# Patient Record
Sex: MALE | Race: WHITE | Employment: OTHER | URBAN - METROPOLITAN AREA
[De-identification: names, ages, dates, MRNs, and addresses within clinical notes are randomized per-mention and may not be internally consistent; named-entity substitution may affect disease eponyms.]

---

## 2017-08-15 PROBLEM — H26.9 LEFT CATARACT: Status: ACTIVE | Noted: 2017-08-15

## 2018-07-02 ENCOUNTER — HOSPITAL ENCOUNTER (OUTPATIENT)
Age: 68
Discharge: HOME | End: 2018-07-02
Payer: COMMERCIAL

## 2018-07-02 ENCOUNTER — HOSPITAL ENCOUNTER (OUTPATIENT)
Age: 68
Discharge: HOME | End: 2018-07-04
Payer: COMMERCIAL

## 2018-07-02 ENCOUNTER — HOSPITAL ENCOUNTER (OUTPATIENT)
Dept: GENERAL RADIOLOGY | Age: 68
Discharge: HOME | End: 2018-07-04
Payer: COMMERCIAL

## 2018-07-02 DIAGNOSIS — I10 HYPERTENSION, UNSPECIFIED TYPE: ICD-10-CM

## 2018-07-02 LAB
ALBUMIN SERPL-MCNC: 4.4 G/DL (ref 3.5–5.2)
ALP BLD-CCNC: 48 U/L (ref 40–129)
ALT SERPL-CCNC: 52 U/L (ref 0–40)
ANION GAP SERPL CALCULATED.3IONS-SCNC: 16 MMOL/L (ref 7–16)
AST SERPL-CCNC: 34 U/L (ref 0–39)
BASOPHILS ABSOLUTE: 0.05 E9/L (ref 0–0.2)
BASOPHILS RELATIVE PERCENT: 0.9 % (ref 0–2)
BILIRUB SERPL-MCNC: 0.4 MG/DL (ref 0–1.2)
BILIRUBIN URINE: NEGATIVE
BLOOD, URINE: NEGATIVE
BUN BLDV-MCNC: 10 MG/DL (ref 8–23)
CALCIUM SERPL-MCNC: 9.3 MG/DL (ref 8.6–10.2)
CHLORIDE BLD-SCNC: 105 MMOL/L (ref 98–107)
CHOLESTEROL, TOTAL: 149 MG/DL (ref 0–199)
CLARITY: CLEAR
CO2: 21 MMOL/L (ref 22–29)
COLOR: YELLOW
CREAT SERPL-MCNC: 1.1 MG/DL (ref 0.7–1.2)
EOSINOPHILS ABSOLUTE: 0.21 E9/L (ref 0.05–0.5)
EOSINOPHILS RELATIVE PERCENT: 3.8 % (ref 0–6)
GFR AFRICAN AMERICAN: >60
GFR NON-AFRICAN AMERICAN: >60 ML/MIN/1.73
GLUCOSE BLD-MCNC: 143 MG/DL (ref 74–109)
GLUCOSE URINE: NEGATIVE MG/DL
HCT VFR BLD CALC: 39.6 % (ref 37–54)
HDLC SERPL-MCNC: 30 MG/DL
HEMOGLOBIN: 13.6 G/DL (ref 12.5–16.5)
IMMATURE GRANULOCYTES #: 0.02 E9/L
IMMATURE GRANULOCYTES %: 0.4 % (ref 0–5)
KETONES, URINE: NEGATIVE MG/DL
LDL CHOLESTEROL CALCULATED: 81 MG/DL (ref 0–99)
LEUKOCYTE ESTERASE, URINE: NEGATIVE
LYMPHOCYTES ABSOLUTE: 1.95 E9/L (ref 1.5–4)
LYMPHOCYTES RELATIVE PERCENT: 34.8 % (ref 20–42)
MCH RBC QN AUTO: 30.2 PG (ref 26–35)
MCHC RBC AUTO-ENTMCNC: 34.3 % (ref 32–34.5)
MCV RBC AUTO: 88 FL (ref 80–99.9)
MONOCYTES ABSOLUTE: 0.61 E9/L (ref 0.1–0.95)
MONOCYTES RELATIVE PERCENT: 10.9 % (ref 2–12)
NEUTROPHILS ABSOLUTE: 2.76 E9/L (ref 1.8–7.3)
NEUTROPHILS RELATIVE PERCENT: 49.2 % (ref 43–80)
NITRITE, URINE: NEGATIVE
PDW BLD-RTO: 12.7 FL (ref 11.5–15)
PH UA: 5.5 (ref 5–9)
PLATELET # BLD: 162 E9/L (ref 130–450)
PMV BLD AUTO: 9.9 FL (ref 7–12)
POTASSIUM SERPL-SCNC: 4.8 MMOL/L (ref 3.5–5)
PROSTATE SPECIFIC ANTIGEN: 0.46 NG/ML (ref 0–4)
PROTEIN UA: NEGATIVE MG/DL
RBC # BLD: 4.5 E12/L (ref 3.8–5.8)
SODIUM BLD-SCNC: 142 MMOL/L (ref 132–146)
SPECIFIC GRAVITY UA: >=1.03 (ref 1–1.03)
T4 TOTAL: 6.6 MCG/DL (ref 4.5–11.7)
TOTAL PROTEIN: 6.9 G/DL (ref 6.4–8.3)
TRIGL SERPL-MCNC: 189 MG/DL (ref 0–149)
TSH SERPL DL<=0.05 MIU/L-ACNC: 2.35 UIU/ML (ref 0.27–4.2)
UROBILINOGEN, URINE: 0.2 E.U./DL
VLDLC SERPL CALC-MCNC: 38 MG/DL
WBC # BLD: 5.6 E9/L (ref 4.5–11.5)

## 2018-07-02 PROCEDURE — 85025 COMPLETE CBC W/AUTO DIFF WBC: CPT

## 2018-07-02 PROCEDURE — 36415 COLL VENOUS BLD VENIPUNCTURE: CPT

## 2018-07-02 PROCEDURE — 71046 X-RAY EXAM CHEST 2 VIEWS: CPT

## 2018-07-02 PROCEDURE — 80053 COMPREHEN METABOLIC PANEL: CPT

## 2018-07-02 PROCEDURE — 80061 LIPID PANEL: CPT

## 2018-07-02 PROCEDURE — G0103 PSA SCREENING: HCPCS

## 2018-07-02 PROCEDURE — 84436 ASSAY OF TOTAL THYROXINE: CPT

## 2018-07-02 PROCEDURE — 84443 ASSAY THYROID STIM HORMONE: CPT

## 2018-07-02 PROCEDURE — 81003 URINALYSIS AUTO W/O SCOPE: CPT

## 2018-09-14 ENCOUNTER — ANESTHESIA EVENT (OUTPATIENT)
Dept: OPERATING ROOM | Age: 68
End: 2018-09-14
Payer: COMMERCIAL

## 2018-09-14 NOTE — ANESTHESIA PRE PROCEDURE
patient. Plan discussed with CRNA. This preop was generated based on the electronic medical record only. Patient was not seen for evaluation or physical exam.  Staff anesthesiologist will evaluate the patient on the day of procedure. Anuja Jurado MD  Staff Anesthesiologist  September 14, 2018  11:20 AM  DOS STAFF ADDENDUM:    Pt seen and examined, chart reviewed (including anesthesia, drug and allergy history). Anesthetic plan, risks, benefits, alternatives, and personnel involved discussed with patient. Patient verbalized an understanding and agrees to proceed. Plan discussed with care team members and agreed upon.     Jane Aleman MD  Staff Anesthesiologist  8:51 AM

## 2018-09-18 ENCOUNTER — HOSPITAL ENCOUNTER (OUTPATIENT)
Age: 68
Setting detail: OUTPATIENT SURGERY
Discharge: HOME | End: 2018-09-18
Attending: OPHTHALMOLOGY | Admitting: OPHTHALMOLOGY
Payer: COMMERCIAL

## 2018-09-18 ENCOUNTER — ANESTHESIA (OUTPATIENT)
Dept: OPERATING ROOM | Age: 68
End: 2018-09-18
Payer: COMMERCIAL

## 2018-09-18 VITALS
TEMPERATURE: 98 F | HEART RATE: 62 BPM | HEIGHT: 73 IN | BODY MASS INDEX: 35.25 KG/M2 | WEIGHT: 266 LBS | OXYGEN SATURATION: 96 % | SYSTOLIC BLOOD PRESSURE: 129 MMHG | RESPIRATION RATE: 14 BRPM | DIASTOLIC BLOOD PRESSURE: 75 MMHG

## 2018-09-18 VITALS
OXYGEN SATURATION: 95 % | TEMPERATURE: 98.6 F | SYSTOLIC BLOOD PRESSURE: 145 MMHG | RESPIRATION RATE: 20 BRPM | DIASTOLIC BLOOD PRESSURE: 87 MMHG

## 2018-09-18 PROBLEM — H26.9 RIGHT CATARACT: Status: ACTIVE | Noted: 2018-09-18

## 2018-09-18 LAB — GLUCOSE BLD-MCNC: 155 MG/DL

## 2018-09-18 PROCEDURE — 6370000000 HC RX 637 (ALT 250 FOR IP): Performed by: OPHTHALMOLOGY

## 2018-09-18 PROCEDURE — 82962 GLUCOSE BLOOD TEST: CPT | Performed by: OPHTHALMOLOGY

## 2018-09-18 PROCEDURE — 3600000003 HC SURGERY LEVEL 3 BASE: Performed by: OPHTHALMOLOGY

## 2018-09-18 PROCEDURE — 2500000003 HC RX 250 WO HCPCS: Performed by: OPHTHALMOLOGY

## 2018-09-18 PROCEDURE — 2580000003 HC RX 258: Performed by: ANESTHESIOLOGY

## 2018-09-18 PROCEDURE — 7100000010 HC PHASE II RECOVERY - FIRST 15 MIN: Performed by: OPHTHALMOLOGY

## 2018-09-18 PROCEDURE — 2500000003 HC RX 250 WO HCPCS: Performed by: NURSE ANESTHETIST, CERTIFIED REGISTERED

## 2018-09-18 PROCEDURE — 7100000011 HC PHASE II RECOVERY - ADDTL 15 MIN: Performed by: OPHTHALMOLOGY

## 2018-09-18 PROCEDURE — 2709999900 HC NON-CHARGEABLE SUPPLY: Performed by: OPHTHALMOLOGY

## 2018-09-18 PROCEDURE — 3700000000 HC ANESTHESIA ATTENDED CARE: Performed by: OPHTHALMOLOGY

## 2018-09-18 PROCEDURE — V2632 POST CHMBR INTRAOCULAR LENS: HCPCS | Performed by: OPHTHALMOLOGY

## 2018-09-18 PROCEDURE — 6360000002 HC RX W HCPCS: Performed by: NURSE ANESTHETIST, CERTIFIED REGISTERED

## 2018-09-18 DEVICE — LENS INTOCU +21.5 DIOPT A CONSTANT 118.8 L13MM DIA6MM 0DEG: Type: IMPLANTABLE DEVICE | Site: EYE | Status: FUNCTIONAL

## 2018-09-18 RX ORDER — BALANCED SALT SOLUTION 6.4; .75; .48; .3; 3.9; 1.7 MG/ML; MG/ML; MG/ML; MG/ML; MG/ML; MG/ML
SOLUTION OPHTHALMIC PRN
Status: DISCONTINUED | OUTPATIENT
Start: 2018-09-18 | End: 2018-09-18 | Stop reason: HOSPADM

## 2018-09-18 RX ORDER — TETRACAINE HYDROCHLORIDE 5 MG/ML
1 SOLUTION OPHTHALMIC ONCE
Status: COMPLETED | OUTPATIENT
Start: 2018-09-18 | End: 2018-09-18

## 2018-09-18 RX ORDER — FLURBIPROFEN SODIUM 0.3 MG/ML
1 SOLUTION/ DROPS OPHTHALMIC
Status: COMPLETED | OUTPATIENT
Start: 2018-09-18 | End: 2018-09-18

## 2018-09-18 RX ORDER — SODIUM CHLORIDE, SODIUM LACTATE, POTASSIUM CHLORIDE, CALCIUM CHLORIDE 600; 310; 30; 20 MG/100ML; MG/100ML; MG/100ML; MG/100ML
INJECTION, SOLUTION INTRAVENOUS CONTINUOUS
Status: DISCONTINUED | OUTPATIENT
Start: 2018-09-18 | End: 2018-09-18 | Stop reason: HOSPADM

## 2018-09-18 RX ORDER — PHENYLEPHRINE HYDROCHLORIDE 100 MG/ML
1 SOLUTION/ DROPS OPHTHALMIC PRN
Status: DISCONTINUED | OUTPATIENT
Start: 2018-09-18 | End: 2018-09-18 | Stop reason: HOSPADM

## 2018-09-18 RX ORDER — ALFENTANIL HYDROCHLORIDE 500 UG/ML
INJECTION INTRAVENOUS PRN
Status: DISCONTINUED | OUTPATIENT
Start: 2018-09-18 | End: 2018-09-18 | Stop reason: SDUPTHER

## 2018-09-18 RX ORDER — MIDAZOLAM HYDROCHLORIDE 1 MG/ML
INJECTION INTRAMUSCULAR; INTRAVENOUS PRN
Status: DISCONTINUED | OUTPATIENT
Start: 2018-09-18 | End: 2018-09-18 | Stop reason: SDUPTHER

## 2018-09-18 RX ORDER — TETRACAINE HYDROCHLORIDE 5 MG/ML
SOLUTION OPHTHALMIC PRN
Status: DISCONTINUED | OUTPATIENT
Start: 2018-09-18 | End: 2018-09-18 | Stop reason: HOSPADM

## 2018-09-18 RX ORDER — PHENYLEPHRINE HCL 2.5 %
1 DROPS OPHTHALMIC (EYE)
Status: COMPLETED | OUTPATIENT
Start: 2018-09-18 | End: 2018-09-18

## 2018-09-18 RX ORDER — CYCLOPENTOLATE HYDROCHLORIDE 10 MG/ML
1 SOLUTION/ DROPS OPHTHALMIC
Status: COMPLETED | OUTPATIENT
Start: 2018-09-18 | End: 2018-09-18

## 2018-09-18 RX ADMIN — PHENYLEPHRINE HYDROCHLORIDE 1 DROP: 25 SOLUTION OPHTHALMIC at 08:35

## 2018-09-18 RX ADMIN — SODIUM CHLORIDE, POTASSIUM CHLORIDE, SODIUM LACTATE AND CALCIUM CHLORIDE: 600; 310; 30; 20 INJECTION, SOLUTION INTRAVENOUS at 09:00

## 2018-09-18 RX ADMIN — PHENYLEPHRINE HYDROCHLORIDE 1 DROP: 25 SOLUTION OPHTHALMIC at 08:40

## 2018-09-18 RX ADMIN — MIDAZOLAM 1 MG: 1 INJECTION INTRAMUSCULAR; INTRAVENOUS at 09:54

## 2018-09-18 RX ADMIN — FLURBIPROFEN SODIUM 1 DROP: 0.3 SOLUTION/ DROPS OPHTHALMIC at 08:40

## 2018-09-18 RX ADMIN — ALFENTANIL HYDROCHLORIDE 250 MCG: 500 INJECTION INTRAVENOUS at 09:58

## 2018-09-18 RX ADMIN — ALFENTANIL HYDROCHLORIDE 250 MCG: 500 INJECTION INTRAVENOUS at 09:55

## 2018-09-18 RX ADMIN — TETRACAINE HYDROCHLORIDE 1 DROP: 25 LIQUID OPHTHALMIC at 09:09

## 2018-09-18 RX ADMIN — MIDAZOLAM 1 MG: 1 INJECTION INTRAMUSCULAR; INTRAVENOUS at 09:52

## 2018-09-18 RX ADMIN — FLURBIPROFEN SODIUM 1 DROP: 0.3 SOLUTION/ DROPS OPHTHALMIC at 08:35

## 2018-09-18 RX ADMIN — ALFENTANIL HYDROCHLORIDE 250 MCG: 500 INJECTION INTRAVENOUS at 09:54

## 2018-09-18 RX ADMIN — FLURBIPROFEN SODIUM 1 DROP: 0.3 SOLUTION/ DROPS OPHTHALMIC at 08:30

## 2018-09-18 RX ADMIN — ALFENTANIL HYDROCHLORIDE 250 MCG: 500 INJECTION INTRAVENOUS at 09:52

## 2018-09-18 RX ADMIN — CYCLOPENTOLATE HYDROCHLORIDE 1 DROP: 10 SOLUTION/ DROPS OPHTHALMIC at 08:30

## 2018-09-18 RX ADMIN — PHENYLEPHRINE HYDROCHLORIDE 1 DROP: 25 SOLUTION OPHTHALMIC at 08:30

## 2018-09-18 RX ADMIN — CYCLOPENTOLATE HYDROCHLORIDE 1 DROP: 10 SOLUTION/ DROPS OPHTHALMIC at 08:40

## 2018-09-18 RX ADMIN — CYCLOPENTOLATE HYDROCHLORIDE 1 DROP: 10 SOLUTION/ DROPS OPHTHALMIC at 08:35

## 2018-09-18 ASSESSMENT — PAIN - FUNCTIONAL ASSESSMENT: PAIN_FUNCTIONAL_ASSESSMENT: 0-10

## 2018-09-18 ASSESSMENT — LIFESTYLE VARIABLES: SMOKING_STATUS: 1

## 2018-09-18 ASSESSMENT — PULMONARY FUNCTION TESTS
PIF_VALUE: 0

## 2018-09-18 ASSESSMENT — PAIN SCALES - GENERAL
PAINLEVEL_OUTOF10: 0
PAINLEVEL_OUTOF10: 0

## 2018-09-18 NOTE — OP NOTE
PREOPERATIVE DIAGNOSIS:  Right Cataract    POSTOPERATIVE DIAGNOSIS:  Right Cataract    PROCEDURE:  Right phacoemulsification with intraocular lens implant. ANESTHESIA:  Local Mac    ESTIMATED BLOOD LOSS:  Minimal    COMPLICATIONS:  None    DESCRIPTION OF PROCEDURE:  The patient was brought into the operating room. The operative eye was marked, which was the right eye. The right eye was then prepped with a full-strength Betadine preparation. The periorbital area was copiously washed with Betadine. The lashes were washed with Betadine. Dilute Betadine was then also put in the inferior and superior fornices and left in place for approximately a minute or 2. This was then irrigated with sterile water. The face was then wiped and the preparation was repeated 1 more time. The drape was then placed over the operative eye with the sticky adhesive placed at the lid margins so that it draped the lashes out of the operative field superiorly and inferiorly, as well as isolated the meibomian glands behind the drape. This cleared the operative field of any meibomian gland secretions and eyelashes. Next, a lid speculum was positioned in the right eye. A super sharp blade was used to make a side-port incision at the 2 o'clock position. A clear corneal incision was fashioned over the 12 o;clock meridian with a 2.3mm keratome at the limbus. Healon was used to reform the anterior chamber. A continuous tear anterior capsulotomy was then performed with a bent needle cystotome. Hydrodissection was performed by irrigating with balanced salt solution through a syringe underneath the anterior capsular flap to loosen and allow free rotation of the lens nucleus. Phacoemulsification was used and the entire lens nucleus was removed. The I A unit was instilled in the eye, and the remaining cortex was removed. Healon was used to separate the anterior and posterior capsular flaps.   The PCBOO 21.5 diopter lens was then instilled into the capsular bag and dialed to 3 and 9 o'clock positions. Healon was removed from in front of and behind the lens. The lens was found to be well centered, well positioned in the bag and stable. The wound was checked and found to be airtight and watertight. The lid speculum was removed and the drape was removed. The patient was brought to the recovery room in excellent condition, given postoperative instructions, and discharge in excellent condition.

## 2018-09-18 NOTE — H&P
Patient Name: Daisy Chavarria is a 70 y.o. male    Chief Complaint of:  Decreased vision of the right eye    Present Illness:  Mature right cataract with decreased vision. Risks and complications as well as options and benefits were discussed with the patient and he has elected to proceed with right cataract extraction with intra ocular lens implant. History:    Past Medical History:   Diagnosis Date    CAD (coronary artery disease)     has had 2 cardiac caths   states has small blockages    COPD (chronic obstructive pulmonary disease) (HCC)     Diabetes mellitus (HCC)     Gastric ulcer     surgery in 1983    Hypertension     Sleep apnea     doesnt use his cpap        Pertinent   Family History:  family history includes Cancer in his mother; Diabetes in his father; Heart Disease in his father. Medications:    No current facility-administered medications for this encounter. Current Outpatient Prescriptions:     glipiZIDE (GLUCOTROL) 5 MG tablet, Take 1 tablet by mouth 2 times daily (before meals). , Disp: 60 tablet, Rfl: 0    metFORMIN (GLUCOPHAGE) 1000 MG tablet, Take 1 tablet by mouth 2 times daily (with meals). , Disp: 60 tablet, Rfl: 0    isosorbide mononitrate (IMDUR) 30 MG CR tablet, Take 30 mg by mouth daily. , Disp: , Rfl:     aspirin 81 MG tablet, Take 81 mg by mouth daily. , Disp: , Rfl:     metoprolol (LOPRESSOR) 25 MG tablet, Take 25 mg by mouth daily , Disp: , Rfl:     Omega 3 1000 MG CAPS, Take 1,000 mg by mouth daily. , Disp: , Rfl:       Allergies:   Patient has no known allergies. PHYSICAL EXAMINATION / GENERAL APPEARANCE:    HEAD: Mature right cataract with decreased vision effecting reading, driving and all routine home activities. The patient complains of severe glare and halos around headlights making it nearly impossible to continue to drive at night.   Visual acuity is 20/100    HEART: CAD    LUNGS: COPD    ABDOMEN: exam deferred    OTHER SIGNIFICANT FINDINGS:  Pseudo left rubi, KAudrey Horton    IMPRESSION/INITIAL DIAGNOSIS:  Mature right cataract      Electronically signed by Bao Richardson MD  on 9/17/2018 at 8:24 PM

## 2020-01-13 ENCOUNTER — HOSPITAL ENCOUNTER (OUTPATIENT)
Age: 70
Discharge: HOME | End: 2020-01-13
Payer: MEDICARE

## 2020-01-13 LAB
ALBUMIN SERPL-MCNC: 4.6 G/DL (ref 3.5–5.2)
ALP BLD-CCNC: 47 U/L (ref 40–129)
ALT SERPL-CCNC: 63 U/L (ref 0–40)
ANION GAP SERPL CALCULATED.3IONS-SCNC: 14 MMOL/L (ref 7–16)
AST SERPL-CCNC: 53 U/L (ref 0–39)
BASOPHILS ABSOLUTE: 0.04 E9/L (ref 0–0.2)
BASOPHILS RELATIVE PERCENT: 0.7 % (ref 0–2)
BILIRUB SERPL-MCNC: 0.4 MG/DL (ref 0–1.2)
BILIRUBIN URINE: NEGATIVE
BLOOD, URINE: NEGATIVE
BUN BLDV-MCNC: 11 MG/DL (ref 8–23)
CALCIUM SERPL-MCNC: 9.4 MG/DL (ref 8.6–10.2)
CHLORIDE BLD-SCNC: 102 MMOL/L (ref 98–107)
CHOLESTEROL, TOTAL: 145 MG/DL (ref 0–199)
CLARITY: CLEAR
CO2: 23 MMOL/L (ref 22–29)
COLOR: YELLOW
CREAT SERPL-MCNC: 1.1 MG/DL (ref 0.7–1.2)
EOSINOPHILS ABSOLUTE: 0.16 E9/L (ref 0.05–0.5)
EOSINOPHILS RELATIVE PERCENT: 2.8 % (ref 0–6)
GFR AFRICAN AMERICAN: >60
GFR NON-AFRICAN AMERICAN: >60 ML/MIN/1.73
GLUCOSE BLD-MCNC: 128 MG/DL (ref 74–99)
GLUCOSE URINE: NEGATIVE MG/DL
HBA1C MFR BLD: 5.6 % (ref 4–5.6)
HCT VFR BLD CALC: 42.5 % (ref 37–54)
HDLC SERPL-MCNC: 30 MG/DL
HEMOGLOBIN: 14.7 G/DL (ref 12.5–16.5)
IMMATURE GRANULOCYTES #: 0.02 E9/L
IMMATURE GRANULOCYTES %: 0.4 % (ref 0–5)
KETONES, URINE: NEGATIVE MG/DL
LDL CHOLESTEROL CALCULATED: 69 MG/DL (ref 0–99)
LEUKOCYTE ESTERASE, URINE: NEGATIVE
LYMPHOCYTES ABSOLUTE: 1.84 E9/L (ref 1.5–4)
LYMPHOCYTES RELATIVE PERCENT: 32.2 % (ref 20–42)
MCH RBC QN AUTO: 30.9 PG (ref 26–35)
MCHC RBC AUTO-ENTMCNC: 34.6 % (ref 32–34.5)
MCV RBC AUTO: 89.3 FL (ref 80–99.9)
MONOCYTES ABSOLUTE: 0.6 E9/L (ref 0.1–0.95)
MONOCYTES RELATIVE PERCENT: 10.5 % (ref 2–12)
NEUTROPHILS ABSOLUTE: 3.05 E9/L (ref 1.8–7.3)
NEUTROPHILS RELATIVE PERCENT: 53.4 % (ref 43–80)
NITRITE, URINE: NEGATIVE
PDW BLD-RTO: 12.6 FL (ref 11.5–15)
PH UA: 5.5 (ref 5–9)
PLATELET # BLD: 153 E9/L (ref 130–450)
PMV BLD AUTO: 10.5 FL (ref 7–12)
POTASSIUM SERPL-SCNC: 5.1 MMOL/L (ref 3.5–5)
PROSTATE SPECIFIC ANTIGEN: 0.45 NG/ML (ref 0–4)
PROTEIN UA: NEGATIVE MG/DL
RBC # BLD: 4.76 E12/L (ref 3.8–5.8)
SODIUM BLD-SCNC: 139 MMOL/L (ref 132–146)
SPECIFIC GRAVITY UA: 1.02 (ref 1–1.03)
T4 TOTAL: 6.5 MCG/DL (ref 4.5–11.7)
TOTAL PROTEIN: 7.1 G/DL (ref 6.4–8.3)
TRIGL SERPL-MCNC: 229 MG/DL (ref 0–149)
TSH SERPL DL<=0.05 MIU/L-ACNC: 2.18 UIU/ML (ref 0.27–4.2)
UROBILINOGEN, URINE: 0.2 E.U./DL
VLDLC SERPL CALC-MCNC: 46 MG/DL
WBC # BLD: 5.7 E9/L (ref 4.5–11.5)

## 2020-01-13 PROCEDURE — G0103 PSA SCREENING: HCPCS

## 2020-01-13 PROCEDURE — 80061 LIPID PANEL: CPT

## 2020-01-13 PROCEDURE — 85025 COMPLETE CBC W/AUTO DIFF WBC: CPT

## 2020-01-13 PROCEDURE — 83036 HEMOGLOBIN GLYCOSYLATED A1C: CPT

## 2020-01-13 PROCEDURE — 36415 COLL VENOUS BLD VENIPUNCTURE: CPT

## 2020-01-13 PROCEDURE — 84443 ASSAY THYROID STIM HORMONE: CPT

## 2020-01-13 PROCEDURE — 81003 URINALYSIS AUTO W/O SCOPE: CPT

## 2020-01-13 PROCEDURE — 80053 COMPREHEN METABOLIC PANEL: CPT

## 2020-01-13 PROCEDURE — 84436 ASSAY OF TOTAL THYROXINE: CPT

## 2020-11-17 ENCOUNTER — HOSPITAL ENCOUNTER (OUTPATIENT)
Age: 70
Discharge: HOME | End: 2020-11-17
Payer: MEDICARE

## 2020-11-17 LAB
ALBUMIN SERPL-MCNC: 4.7 G/DL (ref 3.5–5.2)
ALP BLD-CCNC: 54 U/L (ref 40–129)
ALT SERPL-CCNC: 58 U/L (ref 0–40)
ANION GAP SERPL CALCULATED.3IONS-SCNC: 10 MMOL/L (ref 7–16)
AST SERPL-CCNC: 44 U/L (ref 0–39)
BILIRUB SERPL-MCNC: 0.5 MG/DL (ref 0–1.2)
BUN BLDV-MCNC: 15 MG/DL (ref 8–23)
C-REACTIVE PROTEIN, HIGH SENSITIVITY: 0.8 MG/L (ref 0–3)
CALCIUM SERPL-MCNC: 9.3 MG/DL (ref 8.6–10.2)
CHLORIDE BLD-SCNC: 104 MMOL/L (ref 98–107)
CHOLESTEROL, FASTING: 142 MG/DL (ref 0–199)
CO2: 21 MMOL/L (ref 22–29)
CREAT SERPL-MCNC: 1.2 MG/DL (ref 0.7–1.2)
GFR AFRICAN AMERICAN: >60
GFR NON-AFRICAN AMERICAN: 60 ML/MIN/1.73
GLUCOSE BLD-MCNC: 155 MG/DL (ref 74–99)
HDLC SERPL-MCNC: 32 MG/DL
LDL CHOLESTEROL CALCULATED: 70 MG/DL (ref 0–99)
POTASSIUM SERPL-SCNC: 4.5 MMOL/L (ref 3.5–5)
SODIUM BLD-SCNC: 135 MMOL/L (ref 132–146)
T3 UPTAKE PERCENT: 30.6 % (ref 22.5–37)
T4 TOTAL: 7.5 MCG/DL (ref 4.5–11.7)
TOTAL PROTEIN: 7.3 G/DL (ref 6.4–8.3)
TRIGLYCERIDE, FASTING: 201 MG/DL (ref 0–149)
TSH SERPL DL<=0.05 MIU/L-ACNC: 2.4 UIU/ML (ref 0.27–4.2)
VITAMIN D 25-HYDROXY: 21 NG/ML (ref 30–100)
VLDLC SERPL CALC-MCNC: 40 MG/DL

## 2020-11-17 PROCEDURE — 80061 LIPID PANEL: CPT

## 2020-11-17 PROCEDURE — 84479 ASSAY OF THYROID (T3 OR T4): CPT

## 2020-11-17 PROCEDURE — 80053 COMPREHEN METABOLIC PANEL: CPT

## 2020-11-17 PROCEDURE — 36415 COLL VENOUS BLD VENIPUNCTURE: CPT

## 2020-11-17 PROCEDURE — 82306 VITAMIN D 25 HYDROXY: CPT

## 2020-11-17 PROCEDURE — 84443 ASSAY THYROID STIM HORMONE: CPT

## 2020-11-17 PROCEDURE — 84436 ASSAY OF TOTAL THYROXINE: CPT

## 2020-11-17 PROCEDURE — 86141 C-REACTIVE PROTEIN HS: CPT

## 2021-01-07 ENCOUNTER — OFFICE VISIT (OUTPATIENT)
Dept: CARDIOLOGY CLINIC | Age: 71
End: 2021-01-07
Payer: MEDICARE

## 2021-01-07 VITALS
SYSTOLIC BLOOD PRESSURE: 138 MMHG | BODY MASS INDEX: 34.99 KG/M2 | HEIGHT: 73 IN | OXYGEN SATURATION: 98 % | RESPIRATION RATE: 20 BRPM | DIASTOLIC BLOOD PRESSURE: 82 MMHG | HEART RATE: 70 BPM | WEIGHT: 264 LBS

## 2021-01-07 DIAGNOSIS — R06.09 DOE (DYSPNEA ON EXERTION): ICD-10-CM

## 2021-01-07 DIAGNOSIS — Z01.810 PREOP CARDIOVASCULAR EXAM: Primary | ICD-10-CM

## 2021-01-07 DIAGNOSIS — R94.31 ABNORMAL EKG: ICD-10-CM

## 2021-01-07 PROCEDURE — 99204 OFFICE O/P NEW MOD 45 MIN: CPT | Performed by: INTERNAL MEDICINE

## 2021-01-07 PROCEDURE — 93000 ELECTROCARDIOGRAM COMPLETE: CPT | Performed by: INTERNAL MEDICINE

## 2021-01-07 RX ORDER — FLUTICASONE PROPIONATE 50 MCG
2 SPRAY, SUSPENSION (ML) NASAL DAILY
COMMUNITY

## 2021-01-07 NOTE — PROGRESS NOTES
OUTPATIENT CARDIOLOGY CONSULT    Name: Leonard Rey    Age: 69 y.o. Date of Service: 1/7/2021    Reason for Consultation: Preoperative cardiac evaluation for thyroid surgery    Referring Physician: Aretha March DO    History of Present Illness:  Leonard Rey is a 69 y.o. male who presents today for further evaluation of cardiac evaluation. He has multiple cardiac risk factors including hypertension, diabetes, tobacco abuse and obesity. Stated to cardiac catheterizations in the \"80s and 90s\", but denies any MI or stent placement or angioplasty. He smokes half a pack cigarettes per day and is very sedentary. He lives in apartment with his wife, states most physical exertion he does is walking stressed on the hallway. The apartment is on the first floor with no stairs. Does get occasional shortness of breath when he \"overexerts himself\". He denies chest pain, palpitations, dizziness lightheadedness or syncope. States he has sleep apnea, does not use CPAP machine. He believes his last stress test was in the \"90s\". Review of Systems:  Complete review of systems otherwise negative except as described above.     Past Medical History:  Past Medical History:   Diagnosis Date    CAD (coronary artery disease)     has had 2 cardiac caths   states has small blockages    COPD (chronic obstructive pulmonary disease) (HCC)     Diabetes mellitus (HCC)     Gastric ulcer     surgery in 1983    Hypertension     Sleep apnea     doesnt use his cpap       Past Surgical History:  Past Surgical History:   Procedure Laterality Date    APPENDECTOMY      CATARACT REMOVAL Left 08/15/2017    left eye cataract extraction iwth ioc    CATARACT REMOVAL WITH IMPLANT Right 09/18/2018    COLONOSCOPY      ENDOSCOPY, COLON, DIAGNOSTIC      FRACTURE SURGERY      jaw    OTHER SURGICAL HISTORY      gastric ulcer surgery    DE XCAPSL CTRC RMVL INSJ IO LENS PROSTH W/O ECP Right 9/18/2018    CATARACT EXTRACTION WITH IOL RIGHT EYE performed by Beto Lee MD at [address] [place] [address]       Family History:  Family History   Problem Relation Age of Onset    Cancer Mother         leukemia    Diabetes Father     Heart Attack Father     Heart Attack Brother        Social History:  Social History     Tobacco Use    Smoking status: Current Every Day Smoker     Packs/day: 2.00     Years: 50.00     Pack years: 100.00     Types: Cigarettes    Smokeless tobacco: Never Used    Tobacco comment: currently 0.5 ppd, 1/7/21   Substance Use Topics    Alcohol use: Not Currently     Comment: no alcohol since  March 2014    Drug use: Never   Half pack cigarettes per day patient  Is   He is retired    Allergies:  No Known Allergies    Current Medications:    Current Outpatient Medications:     fluticasone (FLONASE) 50 MCG/ACT nasal spray, 2 sprays by Each Nostril route daily, Disp: , Rfl:     glipiZIDE (GLUCOTROL) 5 MG tablet, Take 1 tablet by mouth 2 times daily (before meals). , Disp: 60 tablet, Rfl: 0    metFORMIN (GLUCOPHAGE) 1000 MG tablet, Take 1 tablet by mouth 2 times daily (with meals). , Disp: 60 tablet, Rfl: 0    isosorbide mononitrate (IMDUR) 30 MG CR tablet, Take 30 mg by mouth daily. , Disp: , Rfl:     aspirin 81 MG tablet, Take 81 mg by mouth daily. , Disp: , Rfl:     metoprolol (LOPRESSOR) 25 MG tablet, Take 25 mg by mouth daily , Disp: , Rfl:     Omega 3 1000 MG CAPS, Take 1,000 mg by mouth daily. , Disp: , Rfl:     Physical Exam:  /82 (Site: Right Upper Arm, Position: Sitting, Cuff Size: Medium Adult)   Pulse 70   Resp 20   Ht 6' 1\" (1.854 m)   Wt 264 lb (119.7 kg)   SpO2 98%   BMI 34.83 kg/m²   Wt Readings from Last 3 Encounters:   01/07/21 264 lb (119.7 kg)   09/18/18 266 lb (120.7 kg)   08/15/17 270 lb 3.2 oz (122.6 kg)     Appearance: Obese male, awake, alert, no acute respiratory distress  Skin: Intact, no rash  Eyes: EOMI, no conjunctival erythema  ENMT: Moist mucous membranes.     Neck: Supple, no elevated JVP, no carotid bruits  Lungs: Clear to auscultation bilaterally. No wheezes, rales, or rhonchi. Cardiac: Regular rhythm with a normal rate. S1 & S2 normal, no murmurs  Abdomen: Soft, nontender, +bowel sounds  Extremities: Moves all extremities x 4, no lower extremity edema  Neurologic: No focal motor deficits apparent, normal mood and affect  Peripheral Pulses: Intact posterior tibial pulses bilaterally    Laboratory Tests:  Lab Results   Component Value Date    CREATININE 1.2 11/17/2020    BUN 15 11/17/2020     11/17/2020    K 4.5 11/17/2020     11/17/2020    CO2 21 (L) 11/17/2020     Lab Results   Component Value Date    MG 1.8 04/11/2014     Lab Results   Component Value Date    WBC 5.7 01/13/2020    HGB 14.7 01/13/2020    HCT 42.5 01/13/2020    MCV 89.3 01/13/2020     01/13/2020     Lab Results   Component Value Date    ALT 58 (H) 11/17/2020    AST 44 (H) 11/17/2020    ALKPHOS 54 11/17/2020    BILITOT 0.5 11/17/2020     No results found for: CKTOTAL, CKMB, CKMBINDEX, TROPONINI  Lab Results   Component Value Date    INR 1.3 04/11/2014    PROTIME 13.3 (H) 04/11/2014     Lab Results   Component Value Date    TSH 2.400 11/17/2020     Lab Results   Component Value Date    LABA1C 5.6 01/13/2020     No results found for: EAG  Lab Results   Component Value Date    CHOL 145 01/13/2020    CHOL 149 07/02/2018    CHOL 151 05/15/2015     Lab Results   Component Value Date    TRIG 229 (H) 01/13/2020    TRIG 189 (H) 07/02/2018    TRIG 153 (H) 05/15/2015     Lab Results   Component Value Date    HDL 32 11/17/2020    HDL 30 01/13/2020    HDL 30 07/02/2018     Lab Results   Component Value Date    LDLCALC 70 11/17/2020    LDLCALC 69 01/13/2020    LDLCALC 81 07/02/2018     Lab Results   Component Value Date    LABVLDL 40 11/17/2020    LABVLDL 46 01/13/2020    LABVLDL 38 07/02/2018     No results found for: CHOLHDLRATIO  No results for input(s): PROBNP in the last 72 hours.     Cardiac Tests:  ECG: Sinus rhythm 70 bpm.  Normal axis. Nonspecific interventricular conduction delay, QRS duration 106 ms. No ST or T wave changes. Echocardiogram: N/A    Stress test: N/A    Cardiac catheterization: N/A    Orders Placed This Encounter   Procedures    Cardiac Stress Test Exercise - Treadmill    EKG 12 Lead        Requested Prescriptions      No prescriptions requested or ordered in this encounter        ASSESSMENT / PLAN:  1. Preoperative cardiac evaluation for thyroid surgery  2. Dyspnea on exertion  3. Coronary artery disease-mild by cath per patient, no records available no prior MI  4. Hypertension, reasonably well controlled  5. Type 2 diabetes, well controlled on oral medications  6. Active tobacco abuse  7. COPD  8. Obstructive sleep apnea, noncompliant with CPAP  9. Obesity BMI 34.8 kg/m²  10. Sedentary lifestyle    Recommendations:  He is very sedentary and it does not seem like he is able to or routinely exerts 4 METS or greater. Exercise treadmill stress test will be useful for cardiac risk a stratification with his cardiac risk factors and dyspnea with exertion which is likely multifactorial.    · Exercise treadmill test  · Lifestyle modification including dietary changes and increase physical activity to achieve weight loss explained and discussed with patient  · Aggressive risk factor modification, including smoking cessation highly recommended  · Compliance with CPAP would be useful  · Continue current cardiac medications  · Continue primary prevention statin therapy given treatment of diabetes  · Further recommendations pending above  · If stress test unremarkable, can follow-up as needed    Thank you for allowing me to participate in your patient's care. Please feel free to contact me if you have any questions or concerns.     John Jacques MD, [facility]

## 2021-01-08 ENCOUNTER — TELEPHONE (OUTPATIENT)
Dept: CARDIOLOGY | Age: 71
End: 2021-01-08

## 2021-01-08 DIAGNOSIS — R94.31 ABNORMAL EKG: ICD-10-CM

## 2021-01-08 DIAGNOSIS — Z01.818 PRE-OP TESTING: ICD-10-CM

## 2021-01-08 DIAGNOSIS — R06.02 SHORTNESS OF BREATH: Primary | ICD-10-CM

## 2021-01-08 NOTE — TELEPHONE ENCOUNTER
1/08/2021, spoke with pt earlier today, states he changed his mind and cannot walk the TM for his stress test, Denice at Dr Christopher Thompson office advised and per Dr Dorota Choudhury, exercise stress test changed to a lexiscan kf rn

## 2021-01-11 ENCOUNTER — TELEPHONE (OUTPATIENT)
Dept: CARDIOLOGY | Age: 71
End: 2021-01-11

## 2021-01-11 ENCOUNTER — HOSPITAL ENCOUNTER (OUTPATIENT)
Dept: CARDIOLOGY | Age: 71
Discharge: HOME | End: 2021-01-11
Payer: MEDICARE

## 2021-01-11 VITALS
HEIGHT: 73 IN | HEART RATE: 87 BPM | RESPIRATION RATE: 20 BRPM | TEMPERATURE: 97.1 F | SYSTOLIC BLOOD PRESSURE: 122 MMHG | WEIGHT: 264 LBS | BODY MASS INDEX: 34.99 KG/M2 | DIASTOLIC BLOOD PRESSURE: 80 MMHG

## 2021-01-11 DIAGNOSIS — R94.31 ABNORMAL EKG: ICD-10-CM

## 2021-01-11 DIAGNOSIS — R06.09 DOE (DYSPNEA ON EXERTION): ICD-10-CM

## 2021-01-11 DIAGNOSIS — Z01.810 PREOP CARDIOVASCULAR EXAM: ICD-10-CM

## 2021-01-11 DIAGNOSIS — Z01.818 PRE-OP TESTING: ICD-10-CM

## 2021-01-11 DIAGNOSIS — R06.02 SHORTNESS OF BREATH: ICD-10-CM

## 2021-01-11 PROCEDURE — A9502 TC99M TETROFOSMIN: HCPCS | Performed by: INTERNAL MEDICINE

## 2021-01-11 PROCEDURE — 93017 CV STRESS TEST TRACING ONLY: CPT

## 2021-01-11 PROCEDURE — 2580000003 HC RX 258: Performed by: INTERNAL MEDICINE

## 2021-01-11 PROCEDURE — 93018 CV STRESS TEST I&R ONLY: CPT | Performed by: INTERNAL MEDICINE

## 2021-01-11 PROCEDURE — 93016 CV STRESS TEST SUPVJ ONLY: CPT | Performed by: INTERNAL MEDICINE

## 2021-01-11 PROCEDURE — 78452 HT MUSCLE IMAGE SPECT MULT: CPT

## 2021-01-11 PROCEDURE — 3430000000 HC RX DIAGNOSTIC RADIOPHARMACEUTICAL: Performed by: INTERNAL MEDICINE

## 2021-01-11 PROCEDURE — 6360000002 HC RX W HCPCS: Performed by: INTERNAL MEDICINE

## 2021-01-11 PROCEDURE — 78452 HT MUSCLE IMAGE SPECT MULT: CPT | Performed by: INTERNAL MEDICINE

## 2021-01-11 RX ORDER — SODIUM CHLORIDE 0.9 % (FLUSH) 0.9 %
10 SYRINGE (ML) INJECTION PRN
Status: DISCONTINUED | OUTPATIENT
Start: 2021-01-11 | End: 2021-01-12 | Stop reason: HOSPADM

## 2021-01-11 RX ADMIN — SODIUM CHLORIDE, PRESERVATIVE FREE 10 ML: 5 INJECTION INTRAVENOUS at 11:39

## 2021-01-11 RX ADMIN — TETROFOSMIN 33.2 MILLICURIE: 0.23 INJECTION, POWDER, LYOPHILIZED, FOR SOLUTION INTRAVENOUS at 11:39

## 2021-01-11 RX ADMIN — TETROFOSMIN 10.7 MILLICURIE: 0.23 INJECTION, POWDER, LYOPHILIZED, FOR SOLUTION INTRAVENOUS at 10:25

## 2021-01-11 RX ADMIN — SODIUM CHLORIDE, PRESERVATIVE FREE 10 ML: 5 INJECTION INTRAVENOUS at 11:40

## 2021-01-11 RX ADMIN — SODIUM CHLORIDE, PRESERVATIVE FREE 10 ML: 5 INJECTION INTRAVENOUS at 10:25

## 2021-01-11 RX ADMIN — REGADENOSON 0.4 MG: 0.08 INJECTION, SOLUTION INTRAVENOUS at 11:39

## 2021-01-11 NOTE — PROCEDURES
[address] [facility] [address] and Vascular Lab - [place] [phone]               Pharmacologic Stress Nuclear Gated SPECT Study    Name: Laila Mullins Account Number: [de-identified]    :  1950          Sex: male         Date of Study:  2021    Height: 6' 1\" (185.4 cm)         Weight: 264 lb (119.7 kg)     Ordering Provider: Yuko Hampton MD          PCP: Chioma Quinones DO      Cardiologist: Yuko Hampton MD             Interpreting Physician: Zechariah Patel MD  _________________________________________________________________________________    Indication:   Preoperative Risk Stratification    Clinical History:   Patient has prior history of coronary artery disease. Resting ECG:    SR with non specific ST T wave changes    Procedure:   Pharmacologic stress testing was performed with regadenoson 0.4 mg for 15 seconds. The heart rate was 87 at baseline and alejandro to 110 beats during the infusion. The blood pressure at baseline was 122/80 and blood pressure at the end of infusion was 138/80. Blood pressure response was normal during the stress procedure. The patient tolerated the infusion well. ECG during the infusion did not change. IMAGING: Myocardial perfusion imaging was performed at rest 30-35 minutes following the intravenous injection of 10.7 mCi of (Tc-tetrofosmin) followed by 10 ml of Normal Saline. As per infusion protocol, the patient was injected intravenously with 33.2 mCi of (Tc-tetrofosmin) followed by 10 ml of Normal Saline. Gated post-stress tomographic imaging was performed 45 minutes after stress. FINDINGS: The overall quality of the study was good. Left ventricular cavity size was noted to be mildly enlarged on both rest and stress studies. Rotational analog analysis demonstrated abnormal patient motion.     A mild defect was present in the inferior wall(s) that was  small size on the post

## 2021-01-11 NOTE — TELEPHONE ENCOUNTER
Left message to schedule lexiscan test.  Electronically signed by pooja Nela on 1/11/2021 at 12:02 PM

## 2021-01-12 ENCOUNTER — TELEPHONE (OUTPATIENT)
Dept: CARDIOLOGY CLINIC | Age: 71
End: 2021-01-12

## 2021-01-12 NOTE — TELEPHONE ENCOUNTER
Contacted patient with results per Dr. Eduin Zazueta. He verbalized understanding.      ----- Message from Ashley Wilson MD sent at 1/11/2021  4:11 PM EST -----    Stress test showed normal blood flow to the heart, no signs of any blockages. Low likelihood of coronary disease, and at low risk of cardiac events. May proceed with surgery without further evaluation and follow up as needed.

## 2021-02-27 ENCOUNTER — HOSPITAL ENCOUNTER (EMERGENCY)
Age: 71
Discharge: HOME | End: 2021-02-27
Attending: EMERGENCY MEDICINE
Payer: MEDICARE

## 2021-02-27 ENCOUNTER — APPOINTMENT (OUTPATIENT)
Dept: CT IMAGING | Age: 71
End: 2021-02-27
Payer: MEDICARE

## 2021-02-27 VITALS
WEIGHT: 260 LBS | SYSTOLIC BLOOD PRESSURE: 175 MMHG | HEIGHT: 73 IN | RESPIRATION RATE: 18 BRPM | BODY MASS INDEX: 34.46 KG/M2 | DIASTOLIC BLOOD PRESSURE: 64 MMHG | OXYGEN SATURATION: 99 % | HEART RATE: 82 BPM | TEMPERATURE: 98.2 F

## 2021-02-27 DIAGNOSIS — K29.90 GASTRITIS AND DUODENITIS: Primary | ICD-10-CM

## 2021-02-27 LAB
ALBUMIN SERPL-MCNC: 4.6 G/DL (ref 3.5–5.2)
ALP BLD-CCNC: 50 U/L (ref 40–129)
ALT SERPL-CCNC: 39 U/L (ref 0–40)
ANION GAP SERPL CALCULATED.3IONS-SCNC: 15 MMOL/L (ref 7–16)
AST SERPL-CCNC: 34 U/L (ref 0–39)
BACTERIA: NORMAL /HPF
BASOPHILS ABSOLUTE: 0.05 E9/L (ref 0–0.2)
BASOPHILS RELATIVE PERCENT: 0.6 % (ref 0–2)
BILIRUB SERPL-MCNC: 0.7 MG/DL (ref 0–1.2)
BILIRUBIN URINE: NEGATIVE
BLOOD, URINE: ABNORMAL
BUN BLDV-MCNC: 22 MG/DL (ref 8–23)
CALCIUM SERPL-MCNC: 9 MG/DL (ref 8.6–10.2)
CHLORIDE BLD-SCNC: 94 MMOL/L (ref 98–107)
CLARITY: CLEAR
CO2: 21 MMOL/L (ref 22–29)
COLOR: YELLOW
CREAT SERPL-MCNC: 1.1 MG/DL (ref 0.7–1.2)
EOSINOPHILS ABSOLUTE: 0.05 E9/L (ref 0.05–0.5)
EOSINOPHILS RELATIVE PERCENT: 0.6 % (ref 0–6)
EPITHELIAL CELLS, UA: NORMAL /HPF
GFR AFRICAN AMERICAN: >60
GFR NON-AFRICAN AMERICAN: >60 ML/MIN/1.73
GLUCOSE BLD-MCNC: 123 MG/DL (ref 74–99)
GLUCOSE URINE: NEGATIVE MG/DL
HCT VFR BLD CALC: 39.6 % (ref 37–54)
HEMOGLOBIN: 14.2 G/DL (ref 12.5–16.5)
IMMATURE GRANULOCYTES #: 0.05 E9/L
IMMATURE GRANULOCYTES %: 0.6 % (ref 0–5)
KETONES, URINE: 15 MG/DL
LACTIC ACID, SEPSIS: 2.8 MMOL/L (ref 0.5–1.9)
LEUKOCYTE ESTERASE, URINE: NEGATIVE
LYMPHOCYTES ABSOLUTE: 1.88 E9/L (ref 1.5–4)
LYMPHOCYTES RELATIVE PERCENT: 23.2 % (ref 20–42)
MCH RBC QN AUTO: 30.9 PG (ref 26–35)
MCHC RBC AUTO-ENTMCNC: 35.9 % (ref 32–34.5)
MCV RBC AUTO: 86.3 FL (ref 80–99.9)
MONOCYTES ABSOLUTE: 0.82 E9/L (ref 0.1–0.95)
MONOCYTES RELATIVE PERCENT: 10.1 % (ref 2–12)
NEUTROPHILS ABSOLUTE: 5.27 E9/L (ref 1.8–7.3)
NEUTROPHILS RELATIVE PERCENT: 64.9 % (ref 43–80)
NITRITE, URINE: NEGATIVE
PDW BLD-RTO: 13 FL (ref 11.5–15)
PH UA: 6 (ref 5–9)
PLATELET # BLD: 177 E9/L (ref 130–450)
PMV BLD AUTO: 9.9 FL (ref 7–12)
POTASSIUM REFLEX MAGNESIUM: 4.3 MMOL/L (ref 3.5–5)
PROTEIN UA: 30 MG/DL
RBC # BLD: 4.59 E12/L (ref 3.8–5.8)
RBC UA: NORMAL /HPF (ref 0–2)
SODIUM BLD-SCNC: 130 MMOL/L (ref 132–146)
SPECIFIC GRAVITY UA: >=1.03 (ref 1–1.03)
TOTAL PROTEIN: 7.1 G/DL (ref 6.4–8.3)
TROPONIN: <0.01 NG/ML (ref 0–0.03)
UROBILINOGEN, URINE: 0.2 E.U./DL
WBC # BLD: 8.1 E9/L (ref 4.5–11.5)
WBC UA: NORMAL /HPF (ref 0–5)

## 2021-02-27 PROCEDURE — 6360000002 HC RX W HCPCS: Performed by: GENERAL PRACTICE

## 2021-02-27 PROCEDURE — 99284 EMERGENCY DEPT VISIT MOD MDM: CPT

## 2021-02-27 PROCEDURE — 84484 ASSAY OF TROPONIN QUANT: CPT

## 2021-02-27 PROCEDURE — 2580000003 HC RX 258: Performed by: GENERAL PRACTICE

## 2021-02-27 PROCEDURE — 6360000004 HC RX CONTRAST MEDICATION: Performed by: RADIOLOGY

## 2021-02-27 PROCEDURE — 36415 COLL VENOUS BLD VENIPUNCTURE: CPT

## 2021-02-27 PROCEDURE — 87088 URINE BACTERIA CULTURE: CPT

## 2021-02-27 PROCEDURE — 80053 COMPREHEN METABOLIC PANEL: CPT

## 2021-02-27 PROCEDURE — 83605 ASSAY OF LACTIC ACID: CPT

## 2021-02-27 PROCEDURE — 81001 URINALYSIS AUTO W/SCOPE: CPT

## 2021-02-27 PROCEDURE — 96375 TX/PRO/DX INJ NEW DRUG ADDON: CPT

## 2021-02-27 PROCEDURE — 85025 COMPLETE CBC W/AUTO DIFF WBC: CPT

## 2021-02-27 PROCEDURE — 74177 CT ABD & PELVIS W/CONTRAST: CPT

## 2021-02-27 PROCEDURE — 96374 THER/PROPH/DIAG INJ IV PUSH: CPT

## 2021-02-27 RX ORDER — ALUMINA, MAGNESIA, AND SIMETHICONE 2400; 2400; 240 MG/30ML; MG/30ML; MG/30ML
20 SUSPENSION ORAL EVERY 6 HOURS PRN
Qty: 355 ML | Refills: 0 | Status: SHIPPED | OUTPATIENT
Start: 2021-02-27

## 2021-02-27 RX ORDER — 0.9 % SODIUM CHLORIDE 0.9 %
1000 INTRAVENOUS SOLUTION INTRAVENOUS ONCE
Status: COMPLETED | OUTPATIENT
Start: 2021-02-27 | End: 2021-02-27

## 2021-02-27 RX ORDER — ONDANSETRON 4 MG/1
4 TABLET, ORALLY DISINTEGRATING ORAL EVERY 8 HOURS PRN
Qty: 20 TABLET | Refills: 0 | Status: SHIPPED | OUTPATIENT
Start: 2021-02-27

## 2021-02-27 RX ORDER — FENTANYL CITRATE 50 UG/ML
100 INJECTION, SOLUTION INTRAMUSCULAR; INTRAVENOUS ONCE
Status: COMPLETED | OUTPATIENT
Start: 2021-02-27 | End: 2021-02-27

## 2021-02-27 RX ORDER — HYDROCODONE BITARTRATE AND ACETAMINOPHEN 5; 325 MG/1; MG/1
1 TABLET ORAL EVERY 6 HOURS PRN
Qty: 12 TABLET | Refills: 0 | Status: SHIPPED | OUTPATIENT
Start: 2021-02-27 | End: 2021-03-02

## 2021-02-27 RX ORDER — ONDANSETRON 2 MG/ML
4 INJECTION INTRAMUSCULAR; INTRAVENOUS ONCE
Status: COMPLETED | OUTPATIENT
Start: 2021-02-27 | End: 2021-02-27

## 2021-02-27 RX ADMIN — IOPAMIDOL 75 ML: 755 INJECTION, SOLUTION INTRAVENOUS at 11:41

## 2021-02-27 RX ADMIN — FENTANYL CITRATE 100 MCG: 50 INJECTION, SOLUTION INTRAMUSCULAR; INTRAVENOUS at 13:37

## 2021-02-27 RX ADMIN — SODIUM CHLORIDE 1000 ML: 9 INJECTION, SOLUTION INTRAVENOUS at 10:35

## 2021-02-27 RX ADMIN — ONDANSETRON 4 MG: 2 INJECTION INTRAMUSCULAR; INTRAVENOUS at 11:12

## 2021-02-27 ASSESSMENT — PAIN SCALES - GENERAL
PAINLEVEL_OUTOF10: 7
PAINLEVEL_OUTOF10: 8

## 2021-02-27 ASSESSMENT — ENCOUNTER SYMPTOMS
SHORTNESS OF BREATH: 0
EYE REDNESS: 0
WHEEZING: 0
SINUS PRESSURE: 0
COUGH: 0
ABDOMINAL PAIN: 1
SORE THROAT: 0
VOMITING: 0
EYE PAIN: 0
EYE DISCHARGE: 0
BACK PAIN: 0
NAUSEA: 1
DIARRHEA: 0

## 2021-02-27 NOTE — ED PROVIDER NOTES
ED  Provider Note  Admit Date/RoomTime: 2/27/2021 10:11 AM  ED Room: 13/13     HPI:   Chava Beltran is a 69 y.o. male presenting to the ED for abdominal pain, beginning 5 days ago. History comes primarily from the patient. Past medical history includes coronary artery disease, diabetes, hypertension, COPD, gastric ulceration. The complaint has been persistent, mild in severity, improved by nothing and worsened by nothing. Associated symptoms include nausea. Lani Garrett states that for the last 5 days he has had no ability to move his bowels. During this time he has had progressively worsening abdominal pain and nausea. The last 3 days he has no longer been able to pass urine either. Today his abdominal pain became intolerable and he presented to [address] [place] [address] Floor emergency department for further evaluation and treatment. On arrival, he was assessed with history, physical exam, imaging studies, laboratory studies, EKG, vital signs. The patient's vital signs are notable for hypertension of 181/90 but were otherwise stable and he was afebrile. Review of Systems   Constitutional: Positive for activity change and appetite change. Negative for chills and fever. HENT: Negative for ear pain, sinus pressure and sore throat. Eyes: Negative for pain, discharge and redness. Respiratory: Negative for cough, shortness of breath and wheezing. Cardiovascular: Negative for chest pain. Gastrointestinal: Positive for abdominal pain and nausea. Negative for diarrhea and vomiting. Genitourinary: Positive for decreased urine volume and difficulty urinating. Negative for dysuria and frequency. Musculoskeletal: Negative for arthralgias and back pain. Skin: Negative for rash and wound. Neurological: Negative for weakness and headaches. Hematological: Negative for adenopathy. All other systems reviewed and are negative. Physical Exam  Constitutional:       General: He is not in acute distress. --------------------------------------------- PAST HISTORY ---------------------------------------------  Past Medical History:  has a past medical history of CAD (coronary artery disease), COPD (chronic obstructive pulmonary disease) ([facility] Diabetes mellitus ([facility] Gastric ulcer, Hypertension, and Sleep apnea. Past Surgical History:  has a past surgical history that includes Colonoscopy; Endoscopy, colon, diagnostic; Cataract removal (Left, 08/15/2017); other surgical history; Appendectomy; fracture surgery; Cataract removal with implant (Right, 09/18/2018); and pr xcapsl ctrc rmvl insj io lens prosth w/o ecp (Right, 9/18/2018). Social History:  reports that he has been smoking cigarettes. He has a 25.00 pack-year smoking history. He has never used smokeless tobacco. He reports previous alcohol use. He reports that he does not use drugs. Family History: family history includes Cancer in his mother; Diabetes in his father; Heart Attack (age of onset: 54) in his brother; Heart Attack (age of onset: 61) in his father. The patients home medications have been reviewed. Allergies: Patient has no known allergies.     -------------------------------------------------- RESULTS -------------------------------------------------  Labs:  Results for orders placed or performed during the hospital encounter of 02/27/21   CBC Auto Differential   Result Value Ref Range    WBC 8.1 4.5 - 11.5 E9/L    RBC 4.59 3.80 - 5.80 E12/L    Hemoglobin 14.2 12.5 - 16.5 g/dL    Hematocrit 39.6 37.0 - 54.0 %    MCV 86.3 80.0 - 99.9 fL    MCH 30.9 26.0 - 35.0 pg    MCHC 35.9 (H) 32.0 - 34.5 %    RDW 13.0 11.5 - 15.0 fL    Platelets 171 695 - 793 E9/L    MPV 9.9 7.0 - 12.0 fL    Neutrophils % 64.9 43.0 - 80.0 %    Immature Granulocytes % 0.6 0.0 - 5.0 %    Lymphocytes % 23.2 20.0 - 42.0 %    Monocytes % 10.1 2.0 - 12.0 %    Eosinophils % 0.6 0.0 - 6.0 %    Basophils % 0.6 0.0 - 2.0 %    Neutrophils Absolute 5.27 1.80 - 7.30 E9/L Immature Granulocytes # 0.05 E9/L    Lymphocytes Absolute 1.88 1.50 - 4.00 E9/L    Monocytes Absolute 0.82 0.10 - 0.95 E9/L    Eosinophils Absolute 0.05 0.05 - 0.50 E9/L    Basophils Absolute 0.05 0.00 - 0.20 E9/L   Comprehensive Metabolic Panel w/ Reflex to MG   Result Value Ref Range    Sodium 130 (L) 132 - 146 mmol/L    Potassium reflex Magnesium 4.3 3.5 - 5.0 mmol/L    Chloride 94 (L) 98 - 107 mmol/L    CO2 21 (L) 22 - 29 mmol/L    Anion Gap 15 7 - 16 mmol/L    Glucose 123 (H) 74 - 99 mg/dL    BUN 22 8 - 23 mg/dL    CREATININE 1.1 0.7 - 1.2 mg/dL    GFR Non-African American >60 >=60 mL/min/1.73    GFR African American >60     Calcium 9.0 8.6 - 10.2 mg/dL    Total Protein 7.1 6.4 - 8.3 g/dL    Albumin 4.6 3.5 - 5.2 g/dL    Total Bilirubin 0.7 0.0 - 1.2 mg/dL    Alkaline Phosphatase 50 40 - 129 U/L    ALT 39 0 - 40 U/L    AST 34 0 - 39 U/L   Troponin   Result Value Ref Range    Troponin <0.01 0.00 - 0.03 ng/mL   Urinalysis, reflex to microscopic   Result Value Ref Range    Color, UA Yellow Straw/Yellow    Clarity, UA Clear Clear    Glucose, Ur Negative Negative mg/dL    Bilirubin Urine Negative Negative    Ketones, Urine 15 (A) Negative mg/dL    Specific Gravity, UA >=1.030 1.005 - 1.030    Blood, Urine TRACE (A) Negative    pH, UA 6.0 5.0 - 9.0    Protein, UA 30 (A) Negative mg/dL    Urobilinogen, Urine 0.2 <2.0 E.U./dL    Nitrite, Urine Negative Negative    Leukocyte Esterase, Urine Negative Negative   Lactate, Sepsis   Result Value Ref Range    Lactic Acid, Sepsis 2.8 (H) 0.5 - 1.9 mmol/L   Microscopic Urinalysis   Result Value Ref Range    WBC, UA 0-1 0 - 5 /HPF    RBC, UA 0-1 0 - 2 /HPF    Epithelial Cells, UA NONE SEEN /HPF    Bacteria, UA NONE SEEN None Seen /HPF       Radiology:  CT ABDOMEN PELVIS W IV CONTRAST Additional Contrast? None   Final Result   1. There are no findings of obstructive uropathy. 2. Very vague induration seen adjacent to the duodenum. Low-grade duodenitis   is not excluded. Please correlate with the patient's symptoms. 3. Splenomegaly. 4. Hepatic steatosis. 5. Satisfactory position of the Summers catheter within the urinary bladder. 6. Bilateral simple renal cysts. ------------------------- NURSING NOTES AND VITALS REVIEWED ---------------------------  Date / Time Roomed:  2/27/2021 10:11 AM  ED Bed Assignment:  13/13    The nursing notes within the ED encounter and vital signs as below have been reviewed. BP (!) 181/90   Pulse 77   Temp 98.2 °F (36.8 °C)   Resp 18   Ht 6' 1\" (1.854 m)   Wt 260 lb (117.9 kg)   SpO2 98%   BMI 34.30 kg/m²   Oxygen Saturation Interpretation: Normal      ------------------------------------------ PROGRESS NOTES ------------------------------------------  2:57 PM EST  I have spoken with the patient and discussed todays results, in addition to providing specific details for the plan of care and counseling regarding the diagnosis and prognosis. Their questions are answered at this time and they are agreeable with the plan. I discussed at length with them reasons for immediate return here for re evaluation. They will followup with their primary care physician by calling their office tomorrow. --------------------------------- ADDITIONAL PROVIDER NOTES ---------------------------------  At this time the patient is without objective evidence of an acute process requiring hospitalization or inpatient management. They have remained hemodynamically stable throughout their entire ED visit and are stable for discharge with outpatient follow-up. The plan has been discussed in detail and they are aware of the specific conditions for emergent return, as well as the importance of follow-up.       New Prescriptions    ALUMINUM & MAGNESIUM HYDROXIDE-SIMETHICONE (MAALOX ADVANCED MAX ST) 400-400-40 MG/5ML SUSP    Take 20 mLs by mouth every 6 hours as needed (abdominal pain)    HYDROCODONE-ACETAMINOPHEN (NORCO) 5-325 MG PER TABLET    Take 1 tablet by mouth every 6 hours as needed for Pain for up to 3 days. Intended supply: 3 days. Take lowest dose possible to manage pain    ONDANSETRON (ZOFRAN ODT) 4 MG DISINTEGRATING TABLET    Take 1 tablet by mouth every 8 hours as needed for Nausea or Vomiting       Diagnosis:  1. Gastritis and duodenitis        Disposition:  Patient's disposition: Discharge to home  Patient's condition is stable.                  [address] DO  Resident  02/27/21 [id number]

## 2021-02-27 NOTE — ED NOTES
Bed: 13  Expected date:   Expected time:   Means of arrival:   Comments:  [id number], RN  02/27/21 1011

## 2021-03-01 LAB — URINE CULTURE, ROUTINE: NORMAL

## 2021-08-28 ENCOUNTER — HOSPITAL ENCOUNTER (OUTPATIENT)
Dept: ULTRASOUND IMAGING | Age: 71
Discharge: HOME | End: 2021-08-28
Payer: MEDICARE

## 2021-08-28 ENCOUNTER — HOSPITAL ENCOUNTER (OUTPATIENT)
Dept: GENERAL RADIOLOGY | Age: 71
Discharge: HOME | End: 2021-08-30
Payer: MEDICARE

## 2021-08-28 ENCOUNTER — HOSPITAL ENCOUNTER (OUTPATIENT)
Age: 71
Discharge: HOME | End: 2021-08-28
Payer: MEDICARE

## 2021-08-28 ENCOUNTER — HOSPITAL ENCOUNTER (OUTPATIENT)
Age: 71
Discharge: HOME | End: 2021-08-30
Payer: MEDICARE

## 2021-08-28 DIAGNOSIS — M54.6 THORACIC BACK PAIN, UNSPECIFIED BACK PAIN LATERALITY, UNSPECIFIED CHRONICITY: ICD-10-CM

## 2021-08-28 DIAGNOSIS — N39.0 URINARY TRACT INFECTION WITHOUT HEMATURIA, SITE UNSPECIFIED: ICD-10-CM

## 2021-08-28 DIAGNOSIS — M54.50 LOW BACK PAIN, UNSPECIFIED BACK PAIN LATERALITY, UNSPECIFIED CHRONICITY, UNSPECIFIED WHETHER SCIATICA PRESENT: ICD-10-CM

## 2021-08-28 LAB
ALBUMIN SERPL-MCNC: 4.5 G/DL (ref 3.5–5.2)
ALP BLD-CCNC: 54 U/L (ref 40–129)
ALT SERPL-CCNC: 37 U/L (ref 0–40)
ANION GAP SERPL CALCULATED.3IONS-SCNC: 11 MMOL/L (ref 7–16)
AST SERPL-CCNC: 29 U/L (ref 0–39)
BACTERIA: NORMAL /HPF
BASOPHILS ABSOLUTE: 0.05 E9/L (ref 0–0.2)
BASOPHILS RELATIVE PERCENT: 0.8 % (ref 0–2)
BILIRUB SERPL-MCNC: 0.5 MG/DL (ref 0–1.2)
BILIRUBIN URINE: ABNORMAL
BLOOD, URINE: NEGATIVE
BUN BLDV-MCNC: 12 MG/DL (ref 6–23)
CALCIUM SERPL-MCNC: 9.4 MG/DL (ref 8.6–10.2)
CHLORIDE BLD-SCNC: 107 MMOL/L (ref 98–107)
CHOLESTEROL, TOTAL: 133 MG/DL (ref 0–199)
CLARITY: CLEAR
CO2: 22 MMOL/L (ref 22–29)
COLOR: ABNORMAL
CREAT SERPL-MCNC: 1.3 MG/DL (ref 0.7–1.2)
EOSINOPHILS ABSOLUTE: 0.23 E9/L (ref 0.05–0.5)
EOSINOPHILS RELATIVE PERCENT: 3.8 % (ref 0–6)
GFR AFRICAN AMERICAN: >60
GFR NON-AFRICAN AMERICAN: 54 ML/MIN/1.73
GLUCOSE BLD-MCNC: 119 MG/DL (ref 74–99)
GLUCOSE URINE: NEGATIVE MG/DL
HCT VFR BLD CALC: 39.9 % (ref 37–54)
HDLC SERPL-MCNC: 29 MG/DL
HEMOGLOBIN: 13.9 G/DL (ref 12.5–16.5)
IMMATURE GRANULOCYTES #: 0.03 E9/L
IMMATURE GRANULOCYTES %: 0.5 % (ref 0–5)
KETONES, URINE: ABNORMAL MG/DL
LDL CHOLESTEROL CALCULATED: 55 MG/DL (ref 0–99)
LEUKOCYTE ESTERASE, URINE: NEGATIVE
LYMPHOCYTES ABSOLUTE: 1.87 E9/L (ref 1.5–4)
LYMPHOCYTES RELATIVE PERCENT: 30.8 % (ref 20–42)
MCH RBC QN AUTO: 30 PG (ref 26–35)
MCHC RBC AUTO-ENTMCNC: 34.8 % (ref 32–34.5)
MCV RBC AUTO: 86 FL (ref 80–99.9)
MONOCYTES ABSOLUTE: 0.7 E9/L (ref 0.1–0.95)
MONOCYTES RELATIVE PERCENT: 11.5 % (ref 2–12)
NEUTROPHILS ABSOLUTE: 3.19 E9/L (ref 1.8–7.3)
NEUTROPHILS RELATIVE PERCENT: 52.6 % (ref 43–80)
NITRITE, URINE: NEGATIVE
PDW BLD-RTO: 13.6 FL (ref 11.5–15)
PH UA: 5.5 (ref 5–9)
PLATELET # BLD: 166 E9/L (ref 130–450)
PMV BLD AUTO: 10 FL (ref 7–12)
POTASSIUM SERPL-SCNC: 4.6 MMOL/L (ref 3.5–5)
PROSTATE SPECIFIC ANTIGEN: 0.52 NG/ML (ref 0–4)
PROTEIN UA: ABNORMAL MG/DL
RBC # BLD: 4.64 E12/L (ref 3.8–5.8)
RBC UA: NORMAL /HPF (ref 0–2)
SODIUM BLD-SCNC: 140 MMOL/L (ref 132–146)
SPECIFIC GRAVITY UA: >=1.03 (ref 1–1.03)
T4 TOTAL: 5.8 MCG/DL (ref 4.5–11.7)
TOTAL PROTEIN: 6.9 G/DL (ref 6.4–8.3)
TRIGL SERPL-MCNC: 245 MG/DL (ref 0–149)
TSH SERPL DL<=0.05 MIU/L-ACNC: 2.04 UIU/ML (ref 0.27–4.2)
UROBILINOGEN, URINE: 0.2 E.U./DL
VLDLC SERPL CALC-MCNC: 49 MG/DL
WBC # BLD: 6.1 E9/L (ref 4.5–11.5)
WBC UA: NORMAL /HPF (ref 0–5)

## 2021-08-28 PROCEDURE — 72072 X-RAY EXAM THORAC SPINE 3VWS: CPT

## 2021-08-28 PROCEDURE — 72100 X-RAY EXAM L-S SPINE 2/3 VWS: CPT

## 2021-08-28 PROCEDURE — 36415 COLL VENOUS BLD VENIPUNCTURE: CPT

## 2021-08-28 PROCEDURE — 81001 URINALYSIS AUTO W/SCOPE: CPT

## 2021-08-28 PROCEDURE — 76775 US EXAM ABDO BACK WALL LIM: CPT

## 2021-08-28 PROCEDURE — 85025 COMPLETE CBC W/AUTO DIFF WBC: CPT

## 2021-08-28 PROCEDURE — 80061 LIPID PANEL: CPT

## 2021-08-28 PROCEDURE — 80053 COMPREHEN METABOLIC PANEL: CPT

## 2021-08-28 PROCEDURE — G0103 PSA SCREENING: HCPCS

## 2021-08-28 PROCEDURE — 84443 ASSAY THYROID STIM HORMONE: CPT

## 2021-08-28 PROCEDURE — 84436 ASSAY OF TOTAL THYROXINE: CPT

## 2021-09-27 ENCOUNTER — HOSPITAL ENCOUNTER (OUTPATIENT)
Dept: CT IMAGING | Age: 71
Discharge: HOME | End: 2021-09-27
Payer: MEDICARE

## 2021-09-27 DIAGNOSIS — N13.30 HYDRONEPHROSIS, UNSPECIFIED HYDRONEPHROSIS TYPE: ICD-10-CM

## 2021-09-27 PROCEDURE — 74176 CT ABD & PELVIS W/O CONTRAST: CPT

## 2023-07-30 ENCOUNTER — APPOINTMENT (OUTPATIENT)
Dept: GENERAL RADIOLOGY | Age: 73
DRG: 069 | End: 2023-07-30
Payer: MEDICARE

## 2023-07-30 ENCOUNTER — APPOINTMENT (OUTPATIENT)
Dept: CT IMAGING | Age: 73
DRG: 069 | End: 2023-07-30
Payer: MEDICARE

## 2023-07-30 ENCOUNTER — HOSPITAL ENCOUNTER (INPATIENT)
Age: 73
LOS: 2 days | Discharge: HOME | DRG: 069 | End: 2023-08-01
Attending: STUDENT IN AN ORGANIZED HEALTH CARE EDUCATION/TRAINING PROGRAM | Admitting: INTERNAL MEDICINE
Payer: MEDICARE

## 2023-07-30 DIAGNOSIS — G45.9 TIA (TRANSIENT ISCHEMIC ATTACK): Primary | ICD-10-CM

## 2023-07-30 PROBLEM — I63.9 STROKE OF UNKNOWN CAUSE (HCC): Status: ACTIVE | Noted: 2023-07-30

## 2023-07-30 PROBLEM — R29.90 STROKE-LIKE SYMPTOMS: Status: ACTIVE | Noted: 2023-07-30

## 2023-07-30 LAB
ANION GAP SERPL CALCULATED.3IONS-SCNC: 14 MMOL/L (ref 7–16)
BASOPHILS # BLD: 0.03 K/UL (ref 0–0.2)
BASOPHILS NFR BLD: 0 % (ref 0–2)
BILIRUB UR QL STRIP: NEGATIVE
BNP SERPL-MCNC: 601 PG/ML (ref 0–450)
BUN SERPL-MCNC: 16 MG/DL (ref 6–23)
CALCIUM SERPL-MCNC: 9.3 MG/DL (ref 8.6–10.2)
CHLORIDE SERPL-SCNC: 102 MMOL/L (ref 98–107)
CHP ED QC CHECK: YES
CLARITY UR: CLEAR
CO2 SERPL-SCNC: 21 MMOL/L (ref 22–29)
COLOR UR: YELLOW
CREAT SERPL-MCNC: 1.2 MG/DL (ref 0.7–1.2)
CRITICAL ACTION: NORMAL
CRITICAL NOTIFICATION DATE/TIME: NORMAL
CRITICAL NOTIFICATION: NORMAL
CRITICAL VALUE READ BACK: YES
EOSINOPHIL # BLD: 0.09 K/UL (ref 0.05–0.5)
EOSINOPHILS RELATIVE PERCENT: 1 % (ref 0–6)
ERYTHROCYTE [DISTWIDTH] IN BLOOD BY AUTOMATED COUNT: 13.2 % (ref 11.5–15)
GFR SERPL CREATININE-BSD FRML MDRD: >60 ML/MIN/1.73M2
GLUCOSE BLD-MCNC: 202 MG/DL
GLUCOSE SERPL-MCNC: 212 MG/DL (ref 74–107)
GLUCOSE UR STRIP-MCNC: 250 MG/DL
HCT VFR BLD AUTO: 38.1 % (ref 37–54)
HGB BLD-MCNC: 13.1 G/DL (ref 12.5–16.5)
HGB UR QL STRIP.AUTO: ABNORMAL
IMM GRANULOCYTES # BLD AUTO: 0.04 K/UL (ref 0–0.58)
IMM GRANULOCYTES NFR BLD: 0 % (ref 0–5)
INR PPP: 1.1
KETONES UR STRIP-MCNC: 15 MG/DL
LACTATE BLDV-SCNC: 3.1 MMOL/L (ref 0.5–2.2)
LACTATE BLDV-SCNC: 3.8 MMOL/L (ref 0.5–2.2)
LEUKOCYTE ESTERASE UR QL STRIP: NEGATIVE
LIPASE SERPL-CCNC: 68 U/L (ref 13–60)
LYMPHOCYTES NFR BLD: 1.15 K/UL (ref 1.5–4)
LYMPHOCYTES RELATIVE PERCENT: 13 % (ref 20–42)
MAGNESIUM SERPL-MCNC: 1.4 MG/DL (ref 1.6–2.6)
MCH RBC QN AUTO: 30 PG (ref 26–35)
MCHC RBC AUTO-ENTMCNC: 34.4 G/DL (ref 32–34.5)
MCV RBC AUTO: 87.2 FL (ref 80–99.9)
METER GLUCOSE: 202 MG/DL (ref 74–99)
MONOCYTES NFR BLD: 0.63 K/UL (ref 0.1–0.95)
MONOCYTES NFR BLD: 7 % (ref 2–12)
NEGATIVE BASE EXCESS, ART: 5 MMOL/L
NEUTROPHILS NFR BLD: 78 % (ref 43–80)
NEUTS SEG NFR BLD: 6.95 K/UL (ref 1.8–7.3)
NITRITE UR QL STRIP: NEGATIVE
PH UR STRIP: 5.5 [PH] (ref 5–9)
PLATELET # BLD AUTO: 172 K/UL (ref 130–450)
PMV BLD AUTO: 10.2 FL (ref 7–12)
POC HCO3: 19.4 MMOL/L (ref 22–26)
POC O2 SATURATION: 56.3 % (ref 92–98.5)
POC PCO2: 33.4 MM HG (ref 35–45)
POC PH: 7.37 (ref 7.35–7.45)
POC PO2: 29.9 MM HG (ref 60–80)
POTASSIUM SERPL-SCNC: 4.8 MMOL/L (ref 3.5–5)
PROT UR STRIP-MCNC: NEGATIVE MG/DL
PROTHROMBIN TIME: 12.6 SEC (ref 9.3–12.4)
RBC # BLD AUTO: 4.37 M/UL (ref 3.8–5.8)
SODIUM SERPL-SCNC: 137 MMOL/L (ref 132–146)
SP GR UR STRIP: 1.01 (ref 1–1.03)
TROPONIN I SERPL HS-MCNC: 9 NG/L (ref 0–11)
TSH SERPL DL<=0.05 MIU/L-ACNC: 1.93 UIU/ML (ref 0.76–16.11)
UROBILINOGEN UR STRIP-ACNC: 0.2 EU/DL (ref 0–1)
WBC OTHER # BLD: 8.9 K/UL (ref 4.5–11.5)

## 2023-07-30 PROCEDURE — 1200000000 HC SEMI PRIVATE

## 2023-07-30 PROCEDURE — 83605 ASSAY OF LACTIC ACID: CPT

## 2023-07-30 PROCEDURE — 82803 BLOOD GASES ANY COMBINATION: CPT

## 2023-07-30 PROCEDURE — 84443 ASSAY THYROID STIM HORMONE: CPT

## 2023-07-30 PROCEDURE — 6370000000 HC RX 637 (ALT 250 FOR IP)

## 2023-07-30 PROCEDURE — 83735 ASSAY OF MAGNESIUM: CPT

## 2023-07-30 PROCEDURE — 93005 ELECTROCARDIOGRAM TRACING: CPT

## 2023-07-30 PROCEDURE — 0042T CT BRAIN PERFUSION: CPT

## 2023-07-30 PROCEDURE — 71045 X-RAY EXAM CHEST 1 VIEW: CPT

## 2023-07-30 PROCEDURE — 83880 ASSAY OF NATRIURETIC PEPTIDE: CPT

## 2023-07-30 PROCEDURE — 84484 ASSAY OF TROPONIN QUANT: CPT

## 2023-07-30 PROCEDURE — 2580000003 HC RX 258

## 2023-07-30 PROCEDURE — 87502 INFLUENZA DNA AMP PROBE: CPT

## 2023-07-30 PROCEDURE — 70496 CT ANGIOGRAPHY HEAD: CPT

## 2023-07-30 PROCEDURE — 85610 PROTHROMBIN TIME: CPT

## 2023-07-30 PROCEDURE — 83690 ASSAY OF LIPASE: CPT

## 2023-07-30 PROCEDURE — 6360000004 HC RX CONTRAST MEDICATION: Performed by: RADIOLOGY

## 2023-07-30 PROCEDURE — 70450 CT HEAD/BRAIN W/O DYE: CPT

## 2023-07-30 PROCEDURE — 36415 COLL VENOUS BLD VENIPUNCTURE: CPT

## 2023-07-30 PROCEDURE — 2580000003 HC RX 258: Performed by: INTERNAL MEDICINE

## 2023-07-30 PROCEDURE — 99285 EMERGENCY DEPT VISIT HI MDM: CPT

## 2023-07-30 PROCEDURE — 85027 COMPLETE CBC AUTOMATED: CPT

## 2023-07-30 PROCEDURE — 87635 SARS-COV-2 COVID-19 AMP PRB: CPT

## 2023-07-30 PROCEDURE — 82947 ASSAY GLUCOSE BLOOD QUANT: CPT

## 2023-07-30 PROCEDURE — 80048 BASIC METABOLIC PNL TOTAL CA: CPT

## 2023-07-30 PROCEDURE — 70498 CT ANGIOGRAPHY NECK: CPT

## 2023-07-30 PROCEDURE — 6360000002 HC RX W HCPCS

## 2023-07-30 RX ORDER — PANTOPRAZOLE SODIUM 40 MG/1
40 TABLET, DELAYED RELEASE ORAL DAILY
Status: DISCONTINUED | OUTPATIENT
Start: 2023-07-31 | End: 2023-08-01 | Stop reason: HOSPADM

## 2023-07-30 RX ORDER — SODIUM CHLORIDE 0.9 % (FLUSH) 0.9 %
5-40 SYRINGE (ML) INJECTION PRN
Status: DISCONTINUED | OUTPATIENT
Start: 2023-07-30 | End: 2023-08-01 | Stop reason: HOSPADM

## 2023-07-30 RX ORDER — MAGNESIUM SULFATE IN WATER 40 MG/ML
2000 INJECTION, SOLUTION INTRAVENOUS ONCE
Status: COMPLETED | OUTPATIENT
Start: 2023-07-30 | End: 2023-07-30

## 2023-07-30 RX ORDER — ACETAMINOPHEN 650 MG/1
650 SUPPOSITORY RECTAL EVERY 6 HOURS PRN
Status: DISCONTINUED | OUTPATIENT
Start: 2023-07-30 | End: 2023-08-01 | Stop reason: HOSPADM

## 2023-07-30 RX ORDER — CLOPIDOGREL 300 MG/1
300 TABLET, FILM COATED ORAL ONCE
Status: COMPLETED | OUTPATIENT
Start: 2023-07-30 | End: 2023-07-30

## 2023-07-30 RX ORDER — 0.9 % SODIUM CHLORIDE 0.9 %
1000 INTRAVENOUS SOLUTION INTRAVENOUS ONCE
Status: COMPLETED | OUTPATIENT
Start: 2023-07-30 | End: 2023-07-30

## 2023-07-30 RX ORDER — ENOXAPARIN SODIUM 100 MG/ML
40 INJECTION SUBCUTANEOUS DAILY
Status: DISCONTINUED | OUTPATIENT
Start: 2023-07-30 | End: 2023-07-30

## 2023-07-30 RX ORDER — ACETAMINOPHEN 325 MG/1
650 TABLET ORAL EVERY 6 HOURS PRN
Status: DISCONTINUED | OUTPATIENT
Start: 2023-07-30 | End: 2023-08-01 | Stop reason: HOSPADM

## 2023-07-30 RX ORDER — SODIUM CHLORIDE 9 MG/ML
INJECTION, SOLUTION INTRAVENOUS PRN
Status: DISCONTINUED | OUTPATIENT
Start: 2023-07-30 | End: 2023-08-01 | Stop reason: HOSPADM

## 2023-07-30 RX ORDER — SODIUM CHLORIDE 0.9 % (FLUSH) 0.9 %
5-40 SYRINGE (ML) INJECTION EVERY 12 HOURS SCHEDULED
Status: DISCONTINUED | OUTPATIENT
Start: 2023-07-30 | End: 2023-08-01 | Stop reason: HOSPADM

## 2023-07-30 RX ORDER — ASPIRIN 81 MG/1
81 TABLET ORAL DAILY
Status: DISCONTINUED | OUTPATIENT
Start: 2023-07-31 | End: 2023-08-01 | Stop reason: HOSPADM

## 2023-07-30 RX ORDER — ASPIRIN 81 MG/1
324 TABLET, CHEWABLE ORAL ONCE
Status: DISCONTINUED | OUTPATIENT
Start: 2023-07-30 | End: 2023-07-30

## 2023-07-30 RX ORDER — ASPIRIN 81 MG/1
324 TABLET ORAL ONCE
Status: COMPLETED | OUTPATIENT
Start: 2023-07-30 | End: 2023-07-30

## 2023-07-30 RX ORDER — PANTOPRAZOLE SODIUM 40 MG/1
40 TABLET, DELAYED RELEASE ORAL DAILY
COMMUNITY

## 2023-07-30 RX ORDER — POLYETHYLENE GLYCOL 3350 17 G/17G
17 POWDER, FOR SOLUTION ORAL DAILY PRN
Status: DISCONTINUED | OUTPATIENT
Start: 2023-07-30 | End: 2023-08-01 | Stop reason: HOSPADM

## 2023-07-30 RX ORDER — DEXTROSE MONOHYDRATE 100 MG/ML
INJECTION, SOLUTION INTRAVENOUS CONTINUOUS PRN
Status: DISCONTINUED | OUTPATIENT
Start: 2023-07-30 | End: 2023-08-01 | Stop reason: HOSPADM

## 2023-07-30 RX ORDER — ENOXAPARIN SODIUM 100 MG/ML
40 INJECTION SUBCUTANEOUS DAILY
Status: DISCONTINUED | OUTPATIENT
Start: 2023-07-31 | End: 2023-08-01 | Stop reason: HOSPADM

## 2023-07-30 RX ADMIN — IOPAMIDOL 120 ML: 755 INJECTION, SOLUTION INTRAVENOUS at 17:45

## 2023-07-30 RX ADMIN — MAGNESIUM SULFATE HEPTAHYDRATE 2000 MG: 40 INJECTION, SOLUTION INTRAVENOUS at 21:22

## 2023-07-30 RX ADMIN — ASPIRIN 324 MG: 81 TABLET, COATED ORAL at 21:05

## 2023-07-30 RX ADMIN — SODIUM CHLORIDE 1000 ML: 9 INJECTION, SOLUTION INTRAVENOUS at 18:13

## 2023-07-30 RX ADMIN — Medication 10 ML: at 21:11

## 2023-07-30 RX ADMIN — CLOPIDOGREL BISULFATE 300 MG: 300 TABLET, FILM COATED ORAL at 21:04

## 2023-07-31 ENCOUNTER — APPOINTMENT (OUTPATIENT)
Dept: CT IMAGING | Age: 73
DRG: 069 | End: 2023-07-31
Payer: MEDICARE

## 2023-07-31 ENCOUNTER — APPOINTMENT (OUTPATIENT)
Dept: MRI IMAGING | Age: 73
DRG: 069 | End: 2023-07-31
Payer: MEDICARE

## 2023-07-31 PROBLEM — R55 SYNCOPE: Status: ACTIVE | Noted: 2023-07-31

## 2023-07-31 LAB
ALBUMIN SERPL-MCNC: 4.1 G/DL (ref 3.5–5.2)
ALP SERPL-CCNC: 44 U/L (ref 40–129)
ALT SERPL-CCNC: 15 U/L (ref 0–40)
AMYLASE SERPL-CCNC: 35 U/L (ref 20–100)
ANION GAP SERPL CALCULATED.3IONS-SCNC: 10 MMOL/L (ref 7–16)
AST SERPL-CCNC: 17 U/L (ref 0–39)
BASOPHILS # BLD: 0.03 K/UL (ref 0–0.2)
BASOPHILS NFR BLD: 1 % (ref 0–2)
BILIRUB SERPL-MCNC: 0.6 MG/DL (ref 0–1.2)
BUN SERPL-MCNC: 13 MG/DL (ref 6–23)
CALCIUM SERPL-MCNC: 9 MG/DL (ref 8.6–10.2)
CHLORIDE SERPL-SCNC: 106 MMOL/L (ref 98–107)
CHOLEST SERPL-MCNC: NORMAL MG/DL
CO2 SERPL-SCNC: 21 MMOL/L (ref 22–29)
CREAT SERPL-MCNC: 1.1 MG/DL (ref 0.7–1.2)
EKG ATRIAL RATE: 67 BPM
EKG P AXIS: 1 DEGREES
EKG P-R INTERVAL: 170 MS
EKG Q-T INTERVAL: 428 MS
EKG QRS DURATION: 106 MS
EKG QTC CALCULATION (BAZETT): 452 MS
EKG R AXIS: 2 DEGREES
EKG T AXIS: -14 DEGREES
EKG VENTRICULAR RATE: 67 BPM
EOSINOPHIL # BLD: 0.12 K/UL (ref 0.05–0.5)
EOSINOPHILS RELATIVE PERCENT: 2 % (ref 0–6)
ERYTHROCYTE [DISTWIDTH] IN BLOOD BY AUTOMATED COUNT: 13.3 % (ref 11.5–15)
GFR SERPL CREATININE-BSD FRML MDRD: >60 ML/MIN/1.73M2
GFR SERPL CREATININE-BSD FRML MDRD: NORMAL ML/MIN/1.73M2
GLUCOSE SERPL-MCNC: 123 MG/DL (ref 74–99)
HBA1C MFR BLD: 5.2 % (ref 4–5.6)
HCT VFR BLD AUTO: 38.5 % (ref 37–54)
HDLC SERPL-MCNC: NORMAL MG/DL
HGB BLD-MCNC: 13.3 G/DL (ref 12.5–16.5)
IMM GRANULOCYTES # BLD AUTO: <0.03 K/UL (ref 0–0.58)
IMM GRANULOCYTES NFR BLD: 0 % (ref 0–5)
LACTATE BLDV-SCNC: 1.2 MMOL/L (ref 0.5–2.2)
LDLC SERPL CALC-MCNC: NORMAL MG/DL
LIPASE SERPL-CCNC: 39 U/L (ref 13–60)
LYMPHOCYTES NFR BLD: 2.02 K/UL (ref 1.5–4)
LYMPHOCYTES RELATIVE PERCENT: 31 % (ref 20–42)
MAGNESIUM SERPL-MCNC: 1.8 MG/DL (ref 1.6–2.6)
MCH RBC QN AUTO: 29.5 PG (ref 26–35)
MCHC RBC AUTO-ENTMCNC: 34.5 G/DL (ref 32–34.5)
MCV RBC AUTO: 85.4 FL (ref 80–99.9)
METER GLUCOSE: 120 MG/DL (ref 74–99)
METER GLUCOSE: 149 MG/DL (ref 74–99)
METER GLUCOSE: 150 MG/DL (ref 74–99)
METER GLUCOSE: 190 MG/DL (ref 74–99)
MONOCYTES NFR BLD: 0.55 K/UL (ref 0.1–0.95)
MONOCYTES NFR BLD: 9 % (ref 2–12)
NEUTROPHILS NFR BLD: 57 % (ref 43–80)
NEUTS SEG NFR BLD: 3.7 K/UL (ref 1.8–7.3)
PHOSPHATE SERPL-MCNC: 2.4 MG/DL (ref 2.5–4.5)
PLATELET # BLD AUTO: 176 K/UL (ref 130–450)
PMV BLD AUTO: 10.1 FL (ref 7–12)
POTASSIUM SERPL-SCNC: 4.3 MMOL/L (ref 3.5–5)
PROCALCITONIN SERPL-MCNC: 0.02 NG/ML (ref 0–0.08)
PROT SERPL-MCNC: 6.3 G/DL (ref 6.4–8.3)
RBC # BLD AUTO: 4.51 M/UL (ref 3.8–5.8)
REASON FOR REJECTION: NORMAL
SODIUM SERPL-SCNC: 137 MMOL/L (ref 132–146)
SPECIMEN SOURCE: NORMAL
T4 FREE SERPL-MCNC: 1.1 NG/DL (ref 0.9–1.7)
T4 SERPL-MCNC: 5.6 UG/DL (ref 4.5–11.7)
TRIGL SERPL-MCNC: NORMAL MG/DL
VLDLC SERPL CALC-MCNC: NORMAL MG/DL
WBC OTHER # BLD: 6.4 K/UL (ref 4.5–11.5)
ZZ NTE CLEAN UP: ORDERED TEST: NORMAL

## 2023-07-31 PROCEDURE — 36415 COLL VENOUS BLD VENIPUNCTURE: CPT

## 2023-07-31 PROCEDURE — 97116 GAIT TRAINING THERAPY: CPT | Performed by: PHYSICAL THERAPIST

## 2023-07-31 PROCEDURE — 97165 OT EVAL LOW COMPLEX 30 MIN: CPT | Performed by: OCCUPATIONAL THERAPIST

## 2023-07-31 PROCEDURE — 93010 ELECTROCARDIOGRAM REPORT: CPT | Performed by: INTERNAL MEDICINE

## 2023-07-31 PROCEDURE — 83690 ASSAY OF LIPASE: CPT

## 2023-07-31 PROCEDURE — 97530 THERAPEUTIC ACTIVITIES: CPT | Performed by: OCCUPATIONAL THERAPIST

## 2023-07-31 PROCEDURE — 82947 ASSAY GLUCOSE BLOOD QUANT: CPT

## 2023-07-31 PROCEDURE — 80053 COMPREHEN METABOLIC PANEL: CPT

## 2023-07-31 PROCEDURE — 70492 CT SFT TSUE NCK W/O & W/DYE: CPT

## 2023-07-31 PROCEDURE — 84100 ASSAY OF PHOSPHORUS: CPT

## 2023-07-31 PROCEDURE — 6370000000 HC RX 637 (ALT 250 FOR IP): Performed by: NURSE PRACTITIONER

## 2023-07-31 PROCEDURE — 6360000004 HC RX CONTRAST MEDICATION: Performed by: RADIOLOGY

## 2023-07-31 PROCEDURE — 85027 COMPLETE CBC AUTOMATED: CPT

## 2023-07-31 PROCEDURE — 83036 HEMOGLOBIN GLYCOSYLATED A1C: CPT

## 2023-07-31 PROCEDURE — 2580000003 HC RX 258: Performed by: INTERNAL MEDICINE

## 2023-07-31 PROCEDURE — 80061 LIPID PANEL: CPT

## 2023-07-31 PROCEDURE — 84145 PROCALCITONIN (PCT): CPT

## 2023-07-31 PROCEDURE — 92610 EVALUATE SWALLOWING FUNCTION: CPT | Performed by: SPEECH-LANGUAGE PATHOLOGIST

## 2023-07-31 PROCEDURE — 97161 PT EVAL LOW COMPLEX 20 MIN: CPT | Performed by: PHYSICAL THERAPIST

## 2023-07-31 PROCEDURE — 83605 ASSAY OF LACTIC ACID: CPT

## 2023-07-31 PROCEDURE — 70553 MRI BRAIN STEM W/O & W/DYE: CPT

## 2023-07-31 PROCEDURE — 82150 ASSAY OF AMYLASE: CPT

## 2023-07-31 PROCEDURE — 6370000000 HC RX 637 (ALT 250 FOR IP): Performed by: INTERNAL MEDICINE

## 2023-07-31 PROCEDURE — 84439 ASSAY OF FREE THYROXINE: CPT

## 2023-07-31 PROCEDURE — 83735 ASSAY OF MAGNESIUM: CPT

## 2023-07-31 PROCEDURE — 1200000000 HC SEMI PRIVATE

## 2023-07-31 PROCEDURE — A9577 INJ MULTIHANCE: HCPCS | Performed by: RADIOLOGY

## 2023-07-31 PROCEDURE — 6360000002 HC RX W HCPCS: Performed by: INTERNAL MEDICINE

## 2023-07-31 RX ORDER — ISOSORBIDE MONONITRATE 30 MG/1
30 TABLET, EXTENDED RELEASE ORAL DAILY
Status: DISCONTINUED | OUTPATIENT
Start: 2023-07-31 | End: 2023-08-01 | Stop reason: HOSPADM

## 2023-07-31 RX ORDER — ALBUTEROL SULFATE 2.5 MG/3ML
2.5 SOLUTION RESPIRATORY (INHALATION) EVERY 4 HOURS PRN
Status: DISCONTINUED | OUTPATIENT
Start: 2023-07-31 | End: 2023-08-01 | Stop reason: HOSPADM

## 2023-07-31 RX ORDER — INSULIN LISPRO 100 [IU]/ML
0-4 INJECTION, SOLUTION INTRAVENOUS; SUBCUTANEOUS NIGHTLY
Status: DISCONTINUED | OUTPATIENT
Start: 2023-07-31 | End: 2023-08-01 | Stop reason: HOSPADM

## 2023-07-31 RX ORDER — ATORVASTATIN CALCIUM 40 MG/1
40 TABLET, FILM COATED ORAL NIGHTLY
Status: DISCONTINUED | OUTPATIENT
Start: 2023-07-31 | End: 2023-08-01 | Stop reason: HOSPADM

## 2023-07-31 RX ORDER — CLOPIDOGREL BISULFATE 75 MG/1
75 TABLET ORAL DAILY
Status: DISCONTINUED | OUTPATIENT
Start: 2023-07-31 | End: 2023-08-01 | Stop reason: HOSPADM

## 2023-07-31 RX ORDER — 0.9 % SODIUM CHLORIDE 0.9 %
1000 INTRAVENOUS SOLUTION INTRAVENOUS ONCE
Status: COMPLETED | OUTPATIENT
Start: 2023-07-31 | End: 2023-07-31

## 2023-07-31 RX ORDER — POTASSIUM CHLORIDE 7.45 MG/ML
10 INJECTION INTRAVENOUS PRN
Status: DISCONTINUED | OUTPATIENT
Start: 2023-07-31 | End: 2023-08-01 | Stop reason: HOSPADM

## 2023-07-31 RX ORDER — POTASSIUM CHLORIDE 20 MEQ/1
40 TABLET, EXTENDED RELEASE ORAL PRN
Status: DISCONTINUED | OUTPATIENT
Start: 2023-07-31 | End: 2023-08-01 | Stop reason: HOSPADM

## 2023-07-31 RX ORDER — INSULIN LISPRO 100 [IU]/ML
0-4 INJECTION, SOLUTION INTRAVENOUS; SUBCUTANEOUS
Status: DISCONTINUED | OUTPATIENT
Start: 2023-07-31 | End: 2023-08-01 | Stop reason: HOSPADM

## 2023-07-31 RX ORDER — MAGNESIUM SULFATE 1 G/100ML
1000 INJECTION INTRAVENOUS PRN
Status: DISCONTINUED | OUTPATIENT
Start: 2023-07-31 | End: 2023-08-01 | Stop reason: HOSPADM

## 2023-07-31 RX ADMIN — PANTOPRAZOLE SODIUM 40 MG: 40 TABLET, DELAYED RELEASE ORAL at 09:55

## 2023-07-31 RX ADMIN — ISOSORBIDE MONONITRATE 30 MG: 30 TABLET, EXTENDED RELEASE ORAL at 09:56

## 2023-07-31 RX ADMIN — ASPIRIN 81 MG: 81 TABLET, COATED ORAL at 09:56

## 2023-07-31 RX ADMIN — CLOPIDOGREL BISULFATE 75 MG: 75 TABLET ORAL at 09:56

## 2023-07-31 RX ADMIN — SODIUM CHLORIDE 1000 ML: 9 INJECTION, SOLUTION INTRAVENOUS at 02:36

## 2023-07-31 RX ADMIN — ENOXAPARIN SODIUM 40 MG: 100 INJECTION SUBCUTANEOUS at 09:55

## 2023-07-31 RX ADMIN — IOPAMIDOL 80 ML: 755 INJECTION, SOLUTION INTRAVENOUS at 05:52

## 2023-07-31 RX ADMIN — Medication 10 ML: at 11:00

## 2023-07-31 RX ADMIN — GADOBENATE DIMEGLUMINE 20 ML: 529 INJECTION, SOLUTION INTRAVENOUS at 14:39

## 2023-07-31 RX ADMIN — Medication 10 ML: at 20:13

## 2023-07-31 RX ADMIN — METOPROLOL TARTRATE 25 MG: 25 TABLET, FILM COATED ORAL at 09:56

## 2023-07-31 RX ADMIN — ATORVASTATIN CALCIUM 40 MG: 40 TABLET, FILM COATED ORAL at 20:13

## 2023-07-31 ASSESSMENT — PAIN - FUNCTIONAL ASSESSMENT
PAIN_FUNCTIONAL_ASSESSMENT: NONE - DENIES PAIN
PAIN_FUNCTIONAL_ASSESSMENT: NONE - DENIES PAIN

## 2023-07-31 ASSESSMENT — ENCOUNTER SYMPTOMS: FACIAL SWELLING: 0

## 2023-07-31 NOTE — ACP (ADVANCE CARE PLANNING)
Advance Care Planning   Healthcare Decision Maker:    Primary Decision Maker: Eva Zarco - Spouse - [phone]    Click here to complete Healthcare Decision Makers including selection of the Healthcare Decision Maker Relationship (ie \"Primary\"). Today we documented Decision Maker(s) consistent with Legal Next of Kin hierarchy.      Electronically signed by CATHY Dee on 7/31/2023 at 9:54 AM

## 2023-07-31 NOTE — ED NOTES
Dr Karyle Meyers called, dr Ramiro Whelan paged x2 for questions regarding neuro ICU transfer     Dylan Reeder RN  07/30/23 9108

## 2023-07-31 NOTE — ED NOTES
Pt given warm blanket and pillow and is resting comfortably in bed     Jose Cummins RN  07/31/23 7458

## 2023-07-31 NOTE — CONSULTS
OTOLARYNGOLOGY  CONSULT NOTE  7/31/2023    Physician Consulted: Dr. Sri Rodríguez  Reason for Consult: parotid masses  Referring Physician: Dr. Toño Engel    DON Bravo is a 75 y.o. male who ENT was consulted for evaluation of incidental bilateral parotid masses. Patient was admitted yesterday after a diaphoretic episode. 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 He denies any symptoms related to the masses including pain or weakness. He reports undergoing prior bilateral parotidectomy with Dr. Roz Byrne many years ago. Denies any other h/o head or neck surgery. Review of Systems   HENT:  Negative for facial swelling. Neurological:  Negative for weakness. All other systems reviewed and are negative. Past Medical History:   Diagnosis Date    CAD (coronary artery disease)     has had 2 cardiac caths   states has small blockages    COPD (chronic obstructive pulmonary disease) (HCC)     Diabetes mellitus ([address])     Gastric ulcer     surgery in 1983    Hypertension     Sleep apnea     doesnt use his cpap       Past Surgical History:   Procedure Laterality Date    APPENDECTOMY      CATARACT REMOVAL Left 08/15/2017    left eye cataract extraction iwth ioc    CATARACT REMOVAL WITH IMPLANT Right 09/18/2018    COLONOSCOPY      ENDOSCOPY, COLON, DIAGNOSTIC      FRACTURE SURGERY      jaw    OTHER SURGICAL HISTORY      gastric ulcer surgery    GA XCAPSL CTRC RMVL INSJ IO LENS PROSTH W/O ECP Right 9/18/2018    CATARACT EXTRACTION WITH IOL RIGHT EYE performed by Calos Salas MD at [address] [place] [address]       Medications Prior to Admission:    Prior to Admission medications    Medication Sig Start Date End Date Taking?  Authorizing Provider   pantoprazole (PROTONIX) 40 MG tablet Take 1 tablet by mouth daily   Yes Historical Provider, MD   fluticasone (FLONASE) 50 MCG/ACT nasal spray 2 sprays by Each Nostril route daily as needed for Allergies

## 2023-07-31 NOTE — CONSULTS
History Of Present Illness: CHIEF COMPLAINT: Diaphoretic episode/  Presyncope     HISTORY OF PRESENT ILLNESS:    Patient is 76-year-old male presented to ED following a diaphoretic episode. Patient states that earlier today at home while watching a ball game he had a sudden diaphoretic episode. He felt sick to his stomach and felt nauseous. He did get up and had associated dizziness. He did feel nauseous but did not vomit. He went and laid down on his bed. He admits to associated headache. His neighbor came to check on him they recommended that he go to the ED for further evaluation management. Denies any significant changes in his medications. States that over the last several months she has had diminished appetite and has lost about 20 to 30 pounds. He denies any chest pain. Denies any palpitations or racing heartbeats. Patient was at home with his wife. as above per hospitalist.  Patient interviewed and additionally reports passing out several times while lying down. The patient is a 75 y.o. male with significant past medical history of see below who presents with above.       The patient has the following symptoms:    Change in level of consciousness: alert    New Weakness: no    Numbness or Tingling: no    Difficulty Swallowing: no    Current Medications:   Scheduled Meds:   isosorbide mononitrate  30 mg Oral Daily    clopidogrel  75 mg Oral Daily    insulin lispro  0-4 Units SubCUTAneous TID WC    insulin lispro  0-4 Units SubCUTAneous Nightly    atorvastatin  40 mg Oral Nightly    sodium chloride flush  5-40 mL IntraVENous 2 times per day    enoxaparin  40 mg SubCUTAneous Daily    aspirin  81 mg Oral Daily    metoprolol tartrate  25 mg Oral Daily    pantoprazole  40 mg Oral Daily     Continuous Infusions:   sodium chloride      dextrose       PRN Meds:magnesium sulfate, sodium phosphate IVPB **OR** sodium phosphate IVPB, potassium chloride **OR** potassium alternative oral replacement **OR**

## 2023-07-31 NOTE — H&P
rales,     CARDIOVASCULAR:    Normal apical impulse, regular rate and rhythm, normal S1 and S2, no S3 or S4, and no murmur noted    ABDOMEN:    , soft, non-distended, non-tender,     MUSCULOSKELETAL:    There is no redness, warmth, or swelling of the joints. NEUROLOGIC:    Awake, alert, oriented to name, place and time. SKIN:    No bruising or bleeding. No redness, warmth, or swelling    EXTREMITIES:    Peripheral pulses present. No edema, cyanosis, or swelling.     LINES/CATHETERS     LABORATORY DATA:  CBC with Differential:    Lab Results   Component Value Date/Time    WBC 8.9 07/30/2023 05:36 PM    RBC 4.37 07/30/2023 05:36 PM    HGB 13.1 07/30/2023 05:36 PM    HCT 38.1 07/30/2023 05:36 PM     07/30/2023 05:36 PM    MCV 87.2 07/30/2023 05:36 PM    MCH 30.0 07/30/2023 05:36 PM    MCHC 34.4 07/30/2023 05:36 PM    RDW 13.2 07/30/2023 05:36 PM    LYMPHOPCT 13 07/30/2023 05:36 PM    MONOPCT 7 07/30/2023 05:36 PM    BASOPCT 0 07/30/2023 05:36 PM    MONOSABS 0.63 07/30/2023 05:36 PM    LYMPHSABS 1.15 07/30/2023 05:36 PM    EOSABS 0.09 07/30/2023 05:36 PM    BASOSABS 0.03 07/30/2023 05:36 PM     CMP:    Lab Results   Component Value Date/Time     07/30/2023 05:36 PM    K 4.8 07/30/2023 05:36 PM    K 4.3 02/27/2021 10:40 AM     07/30/2023 05:36 PM    CO2 21 07/30/2023 05:36 PM    BUN 16 07/30/2023 05:36 PM    CREATININE 1.2 07/30/2023 05:36 PM    GFRAA >60 08/28/2021 11:39 AM    LABGLOM >60 07/30/2023 05:36 PM    GLUCOSE 202 07/30/2023 05:44 PM    PROT 6.9 08/28/2021 11:39 AM    LABALBU 4.5 08/28/2021 11:39 AM    CALCIUM 9.3 07/30/2023 05:36 PM    BILITOT 0.5 08/28/2021 11:39 AM    ALKPHOS 54 08/28/2021 11:39 AM    AST 29 08/28/2021 11:39 AM    ALT 37 08/28/2021 11:39 AM       ASSESSMENT/PLAN:  Possible stroke  Mass associated with left parotid gland and deep right parotid space as well as additional smaller parotid nodules  COPD/asthma  Current tobacco abuse  Coronary artery disease with

## 2023-08-01 ENCOUNTER — APPOINTMENT (OUTPATIENT)
Dept: CT IMAGING | Age: 73
DRG: 069 | End: 2023-08-01
Payer: MEDICARE

## 2023-08-01 VITALS
RESPIRATION RATE: 18 BRPM | OXYGEN SATURATION: 98 % | HEIGHT: 73 IN | TEMPERATURE: 97.7 F | BODY MASS INDEX: 31.81 KG/M2 | HEART RATE: 67 BPM | WEIGHT: 240 LBS | DIASTOLIC BLOOD PRESSURE: 84 MMHG | SYSTOLIC BLOOD PRESSURE: 183 MMHG

## 2023-08-01 PROBLEM — G45.9 TIA (TRANSIENT ISCHEMIC ATTACK): Status: ACTIVE | Noted: 2023-08-01

## 2023-08-01 LAB
ALBUMIN SERPL-MCNC: 4.4 G/DL (ref 3.5–5.2)
ALP SERPL-CCNC: 44 U/L (ref 40–129)
ALT SERPL-CCNC: 20 U/L (ref 0–40)
ANION GAP SERPL CALCULATED.3IONS-SCNC: 10 MMOL/L (ref 7–16)
AST SERPL-CCNC: 24 U/L (ref 0–39)
BASOPHILS # BLD: 0.03 K/UL (ref 0–0.2)
BASOPHILS NFR BLD: 1 % (ref 0–2)
BILIRUB SERPL-MCNC: 0.7 MG/DL (ref 0–1.2)
BUN SERPL-MCNC: 10 MG/DL (ref 6–23)
CALCIUM SERPL-MCNC: 9.5 MG/DL (ref 8.6–10.2)
CHLORIDE SERPL-SCNC: 104 MMOL/L (ref 98–107)
CO2 SERPL-SCNC: 22 MMOL/L (ref 22–29)
CREAT SERPL-MCNC: 1.2 MG/DL (ref 0.7–1.2)
EOSINOPHIL # BLD: 0.1 K/UL (ref 0.05–0.5)
EOSINOPHILS RELATIVE PERCENT: 2 % (ref 0–6)
ERYTHROCYTE [DISTWIDTH] IN BLOOD BY AUTOMATED COUNT: 13.2 % (ref 11.5–15)
GFR SERPL CREATININE-BSD FRML MDRD: >60 ML/MIN/1.73M2
GLUCOSE SERPL-MCNC: 150 MG/DL (ref 74–99)
HBA1C MFR BLD: 5.2 % (ref 4–5.6)
HCT VFR BLD AUTO: 38.3 % (ref 37–54)
HGB BLD-MCNC: 13.3 G/DL (ref 12.5–16.5)
IMM GRANULOCYTES # BLD AUTO: 0.03 K/UL (ref 0–0.58)
IMM GRANULOCYTES NFR BLD: 1 % (ref 0–5)
LV EF: 50 %
LVEF MODALITY: NORMAL
LYMPHOCYTES NFR BLD: 1.36 K/UL (ref 1.5–4)
LYMPHOCYTES RELATIVE PERCENT: 29 % (ref 20–42)
MAGNESIUM SERPL-MCNC: 1.8 MG/DL (ref 1.6–2.6)
MCH RBC QN AUTO: 29.5 PG (ref 26–35)
MCHC RBC AUTO-ENTMCNC: 34.7 G/DL (ref 32–34.5)
MCV RBC AUTO: 84.9 FL (ref 80–99.9)
METER GLUCOSE: 137 MG/DL (ref 74–99)
METER GLUCOSE: 150 MG/DL (ref 74–99)
MONOCYTES NFR BLD: 0.45 K/UL (ref 0.1–0.95)
MONOCYTES NFR BLD: 10 % (ref 2–12)
NEUTROPHILS NFR BLD: 59 % (ref 43–80)
NEUTS SEG NFR BLD: 2.78 K/UL (ref 1.8–7.3)
PHOSPHATE SERPL-MCNC: 2.5 MG/DL (ref 2.5–4.5)
PLATELET # BLD AUTO: 151 K/UL (ref 130–450)
PMV BLD AUTO: 10.3 FL (ref 7–12)
POTASSIUM SERPL-SCNC: 4.2 MMOL/L (ref 3.5–5)
PROT SERPL-MCNC: 6.6 G/DL (ref 6.4–8.3)
RBC # BLD AUTO: 4.51 M/UL (ref 3.8–5.8)
SODIUM SERPL-SCNC: 136 MMOL/L (ref 132–146)
WBC OTHER # BLD: 4.8 K/UL (ref 4.5–11.5)

## 2023-08-01 PROCEDURE — 36415 COLL VENOUS BLD VENIPUNCTURE: CPT

## 2023-08-01 PROCEDURE — 6370000000 HC RX 637 (ALT 250 FOR IP): Performed by: NURSE PRACTITIONER

## 2023-08-01 PROCEDURE — 80053 COMPREHEN METABOLIC PANEL: CPT

## 2023-08-01 PROCEDURE — 84100 ASSAY OF PHOSPHORUS: CPT

## 2023-08-01 PROCEDURE — 83735 ASSAY OF MAGNESIUM: CPT

## 2023-08-01 PROCEDURE — 99221 1ST HOSP IP/OBS SF/LOW 40: CPT | Performed by: OTOLARYNGOLOGY

## 2023-08-01 PROCEDURE — 93306 TTE W/DOPPLER COMPLETE: CPT

## 2023-08-01 PROCEDURE — 6360000002 HC RX W HCPCS: Performed by: INTERNAL MEDICINE

## 2023-08-01 PROCEDURE — 97535 SELF CARE MNGMENT TRAINING: CPT

## 2023-08-01 PROCEDURE — 6360000004 HC RX CONTRAST MEDICATION: Performed by: RADIOLOGY

## 2023-08-01 PROCEDURE — 97530 THERAPEUTIC ACTIVITIES: CPT

## 2023-08-01 PROCEDURE — 71260 CT THORAX DX C+: CPT

## 2023-08-01 PROCEDURE — 6370000000 HC RX 637 (ALT 250 FOR IP): Performed by: INTERNAL MEDICINE

## 2023-08-01 PROCEDURE — 82947 ASSAY GLUCOSE BLOOD QUANT: CPT

## 2023-08-01 PROCEDURE — 2580000003 HC RX 258: Performed by: INTERNAL MEDICINE

## 2023-08-01 PROCEDURE — 85025 COMPLETE CBC W/AUTO DIFF WBC: CPT

## 2023-08-01 RX ORDER — ATORVASTATIN CALCIUM 40 MG/1
40 TABLET, FILM COATED ORAL NIGHTLY
Qty: 30 TABLET | Refills: 3 | Status: SHIPPED | OUTPATIENT
Start: 2023-08-01

## 2023-08-01 RX ADMIN — Medication 10 ML: at 07:59

## 2023-08-01 RX ADMIN — METOPROLOL TARTRATE 25 MG: 25 TABLET, FILM COATED ORAL at 07:59

## 2023-08-01 RX ADMIN — CLOPIDOGREL BISULFATE 75 MG: 75 TABLET ORAL at 07:59

## 2023-08-01 RX ADMIN — ASPIRIN 81 MG: 81 TABLET, COATED ORAL at 07:59

## 2023-08-01 RX ADMIN — IOPAMIDOL 80 ML: 755 INJECTION, SOLUTION INTRAVENOUS at 07:02

## 2023-08-01 RX ADMIN — ISOSORBIDE MONONITRATE 30 MG: 30 TABLET, EXTENDED RELEASE ORAL at 07:59

## 2023-08-01 RX ADMIN — ENOXAPARIN SODIUM 40 MG: 100 INJECTION SUBCUTANEOUS at 07:58

## 2023-08-01 RX ADMIN — PANTOPRAZOLE SODIUM 40 MG: 40 TABLET, DELAYED RELEASE ORAL at 07:59

## 2023-08-01 NOTE — DISCHARGE INSTRUCTIONS
Your information:  Name: Vasiliy Garcia  : 1950    Your instructions:    YOU ARE BEING DISCHARGED HOME. PLEASE MAKE AND KEEP YOUR FOLLOW UP APPOINTMENTS. IF YOU EXPERIENCE ANY OF THE FOLLOWING SYMPTOMS, CHEST PAIN, SHORTNESS OF BREATH, COUGHING UP BLOOD OR BLOODY SPUTUM, STOMACH PAIN OR CRAMPING, DARK, TARRY STOOLS, LOSS OF APPETITE, GENERAL NOT FEELING WELL, SIGNS AND SYMPTOMS OF INFECTION LIKE FEVER AND OR CHILLS, PLEASE CALL PCP OR RETURN TO THE EMERGENCY ROOM. What to do after you leave the hospital:    Recommended diet: diabetic diet    Recommended activity: activity as tolerated        The following personal items were collected during your admission and were returned to you:    Belongings  Dental Appliances: Uppers, Lowers, At bedside  Vision - Corrective Lenses: Eyeglasses, At home  Hearing Aid: None  Clothing: Shorts, At bedside, Undergarments, Socks, Shirt  Jewelry: Ring, At bedside (wedding ring- on pt's finger)  Body Piercings Removed: N/A  Electronic Devices: None  Weapons (Notify Protective Services/Security): None  Other Valuables: At home  Home Medications: None  Valuables Given To: Family (Comment) (wife)  Provide Name(s) of Who Valuable(s) Were Given To: [facility] (\"[facility]") Haroldo  Responsible person(s) in the waiting room: N/A  Patient approves for provider to speak to responsible person post operatively: Yes    Information obtained by:  By signing below, I understand that if any problems occur once I leave the hospital I am to contact PCP. I understand and acknowledge receipt of the instructions indicated above.

## 2023-08-01 NOTE — CARE COORDINATION
8/1/2023 0931  note: Pt is pleasant and resides with his wife. He plans to return home and will have transportation. No needs identified.  Margie NICHOLS
expects to discharge to:    Plan for transportation at discharge:      Financial    Payor: Ralph Lyn / Plan: Patria Veras PPO / Product Type: Medicare /       Potential assistance Purchasing Medications:    Meds-to-Beds request:        [id number] - [place], [place] - [address] [facility] [phone] - [address]  [address] [address]  Phone: [phone] Fax: [phone]      Notes:    Factors facilitating achievement of predicted outcomes: Family support and Pleasant    Barriers to discharge: none    Additional Case Management Notes:   SS Note: No Covid test. Pt w/ Stroke-like symptoms; Acute CVA versus TIA. MRI pending. Pt started on Plavix & has NIDD II. Pt was counseled on smoking cessation via YRIS Lindsey CNP but not willingness to quit smoking @ this time, which he confirmed w/SW. He noted he has been smoking 60+ yrs. Pt on RA and no hx of 02. Pt is  & lives w/spouse in [address] [place] [address]- no PHILLIP. PCP is Cass Alvarado, DO & Pharmacy is [facility]. Pt has no DME & PTA, pt is independent in IADL's/ADL's, drives and has insurance. Pt w/no past hx of HHC, SPENCER & he does not think he will need this. PT/OT & SLP to assess. Pt plans on returning home & notes he is ready now. Pt's wife to transport home. The Plan for Transition of Care is related to the following treatment goals of Stroke-like symptoms [R29.90]  Stroke of unknown cause ([address]) [V98.3]    IF APPLICABLE: The Patient and/or patient representative Marielle Barrera and his family were provided with a choice of provider and agrees with the discharge plan. Freedom of choice list with basic dialogue that supports the patient's individualized plan of care/goals and shares the quality data associated with the providers was provided to:     Patient Representative Name:       The Patient and/or Patient Representative Agree with the Discharge Plan?     Yes    CATHY Lloyd  Case Management

## 2023-08-01 NOTE — PROGRESS NOTES
4 Eyes Skin Assessment     NAME:  Eric Zarco  YOB: 1950  MEDICAL RECORD NUMBER:  [id number]    The patient is being assessed for  Admission    I agree that at least one RN has performed a thorough Head to Toe Skin Assessment on the patient. ALL assessment sites listed below have been assessed. Areas assessed by both nurses:    Head, Face, Ears, Shoulders, Back, Chest, Arms, Elbows, Hands, Sacrum. Buttock, Coccyx, Ischium, and Legs. Feet and Heels        Does the Patient have a Wound?  No noted wound(s)       Audie Prevention initiated by RN: Yes  Wound Care Orders initiated by RN: No    Pressure Injury (Stage 3,4, Unstageable, DTI, NWPT, and Complex wounds) if present, place Wound referral order by RN under : No    New Ostomies, if present place, Ostomy referral order under : No     Nurse 1 eSignature: Electronically signed by Hal Recinos RN on 7/31/23 at 7:35 PM EDT    **SHARE this note so that the co-signing nurse can place an eSignature**    Nurse 2 eSignature: {Esignature:[id number]}
[facility]  [address] [facility], [facility]         Date:2023                                                   Patient Name: Rhiannon Bravo     MRN: [id number]     : 1950     Room:        Evaluating OT: AKHIL Jack/MALINI; [id number]       Referring Provider and Orders/Date:   OT eval and treat  Start:  23,   End:  23,   ONE TIME,   Standing Count:  1 Occurrences,   MOE Hamilton DO        Diagnosis: Diaphoretic episode/  Presyncope     Surgery: none      Pertinent Medical History:        Past Medical History:   Diagnosis Date    CAD (coronary artery disease)     has had 2 cardiac caths   states has small blockages    COPD (chronic obstructive pulmonary disease) ([address])     Diabetes mellitus ([address])     Gastric ulcer     surgery in     Hypertension     Sleep apnea     doesnt use his cpap          Past Surgical History:   Procedure Laterality Date    APPENDECTOMY      CATARACT REMOVAL Left 08/15/2017    left eye cataract extraction iwth ioc    CATARACT REMOVAL WITH IMPLANT Right 2018    COLONOSCOPY      ENDOSCOPY, COLON, DIAGNOSTIC      FRACTURE SURGERY      jaw    OTHER SURGICAL HISTORY      gastric ulcer surgery    [place] XCAPSL CTRC RMVL INSJ IO LENS PROSTH W/O ECP Right 2018    CATARACT EXTRACTION WITH IOL RIGHT EYE performed by Calos Salas MD at [facility]       Precautions:  Fall Risk    Recommended placement: home with [facility] and wife assist    Assessment of current deficits     [x] Functional mobility  [x]ADLs  [x] Strength               []Cognition     [x] Functional transfers   [x] IADLs         [x] Safety Awareness   [x]Endurance     [] Fine Coordination              [x] Balance      [] Vision/perception   []Sensation      [x]Gross Motor Coordination  [] ROM  [] Delirium                   [] Motor Control     OT PLAN OF CARE   OT POC based on
Internal Medicine Progress Note    [facility]    Simeon Matos. Michel Gallardo, PACOOAudreyIAudrey Isaac D.O., PACOO.I. Refugio Lean, D.O. Diannah Koyanagi, MSN, APRN, NP-C  Clarissa Kaur. Chery Lee, MSN, APRN-CNP     Primary Care Physician: Yohana Garcia DO   Admitting Physician:  Denilson Goodman DO  Admission date and time: 7/30/2023  5:07 PM    Room:  07/07  Admitting diagnosis: Stroke-like symptoms [R29.90]  Stroke of unknown cause [facility]) [I63.9]    Patient Name: Hank Puri  MRN: [id number]    Date of Service: 7/31/2023     Subjective:  Avinash Zhang is a 75 y.o. male who was seen and examined today,7/31/2023, at the bedside. Neurologic symptoms have entirely resolved. He is feeling better. He has ambulated since ER arrival with no longer ataxic gait. Discussed plan for MRI, therapy evaluation and consideration for discharge thereafter. We discussed smoking cessation as below. No family present during my examination. Review of System:   Constitutional:   Denies fever or chills, weight loss or gain, fatigue or malaise. HEENT:   Denies ear pain, sore throat, sinus or eye problems. Cardiovascular:   Denies any chest pain, irregular heartbeats, or palpitations. Respiratory:   Denies shortness of breath, coughing, sputum production, hemoptysis, or wheezing. + Smoking  Gastrointestinal:   Denies nausea, vomiting, diarrhea, or constipation. Denies any abdominal pain. Genitourinary:    Denies any urgency, frequency, hematuria. Voiding  without difficulty. Extremities:   Denies lower extremity swelling, edema or cyanosis. Neurology:    Initially complained of dizziness and ataxic gait which has now resolved. Presently denies any headache or focal neurological deficits, Denies generalized weakness or memory difficulty. Psch:   Denies being anxious or depressed. Musculoskeletal:    Denies  myalgias, joint complaints or back pain.    Integumentary:   Denies any rashes,
Physical Therapy    Physical Therapy Initial Evaluation/Plan of Care    Room #:  07/07  Patient Name: Holly Caldwell  YOB: 1950  MRN: [id number]    Date of Service: 7/31/2023     Tentative placement recommendation: Home with no Physical Therapy needs  Equipment recommendation: Patient has needed equipment       Evaluating Physical Therapist: Hillary Melchor, PT  #[id number]      Specific Provider Orders/Date/Referring Provider :  07/30/23 2200    PT eval and treat  Start:  07/30/23 2200,   End:  07/30/23 2200,   ONE TIME,   Standing Count:  1 Occurrences,   R         Genet Padron DO     Admitting Diagnosis:   Stroke-like symptoms [R29.90]  Stroke of unknown cause ([address]) [I63.9]    Admitted with   diaphoresis and nausea   Surgery: none      Patient Active Problem List   Diagnosis    Hyperglycemia    Elevated transaminase level    Hyponatremia    HTN (hypertension)    Left cataract    Right cataract    Stroke-like symptoms    Stroke of unknown cause ([address])        ASSESSMENT No skilled needs identified; will discharge from services. If condition changes, please reorder physical therapy. PHYSICAL THERAPY  PLAN OF CARE       Physical therapy plan of care is established based on physician order,  patient diagnosis and clinical assessment    Patient and or family understand(s) diagnosis, prognosis, and plan of care.      Prior Level of Function: Patient ambulated independently, with cane    Rehab Potential: good   for baseline    Past medical history:   Past Medical History:   Diagnosis Date    CAD (coronary artery disease)     has had 2 cardiac caths   states has small blockages    COPD (chronic obstructive pulmonary disease) ([address])     Diabetes mellitus ([address])     Gastric ulcer     surgery in 1983    Hypertension     Sleep apnea     doesnt use his cpap     Past Surgical History:   Procedure Laterality Date    APPENDECTOMY      CATARACT REMOVAL Left 08/15/2017    left eye cataract extraction [facility]
SPEECH/LANGUAGE PATHOLOGY  CLINICAL ASSESSMENT OF SWALLOWING FUNCTION   and PLAN OF CARE    PATIENT NAME:  Albaro Olivares  (male)     MRN:  [id number]    :  1950  (75 y.o.)  STATUS:  Inpatient: Room 0420-    TODAY'S DATE:  2023  REFERRING PROVIDER:     SLP swallowing-dysphagia (IP)  Start:  23,   End:  23,   ONE TIME,   Standing Count:  1 Occurrences,   R         Shobha Hatch DO   REASON FOR REFERRAL: dysphagia    EVALUATING THERAPIST: LUPE Pizarro                 RESULTS:    DYSPHAGIA DIAGNOSIS:   Clinical indicators of functional swallow for age/premorbid functioning      DIET RECOMMENDATIONS:  Regular consistency solids (IDDSI level 7) with  thin liquids (IDDSI level 0)     FEEDING RECOMMENDATIONS:     Assistance level:  No assistance needed      Compensatory strategies recommended: No strategies are recommended at this time      Discussed recommendations with nursing and/or faxed report to referring provider: No secondary to no diet/liquid change recommended     SPEECH THERAPY  PLAN OF CARE   The dysphagia POC is established based on physician order, dysphagia diagnosis and results of clinical assessment     Meal time assessment for 1-2 sessions to provide diet modification and compensatory strategy implementation due to limited intake on exam     Conditions Requiring Skilled Therapeutic Intervention for dysphagia:    impaired mastication  primarily due to edentulous     Specific dysphagia interventions to include:     ongoing mealtime assessment to provide diet modification and compensatory strategy implementation to minimize risk of aspiration associated with PO intake    Specific instructions for next treatment:  development and training of compensatory swallow strategies to improve airway protection and swallow function  Patient Treatment Goals:    Short Term Goals:  Pt will participate in meal time assessment for 1-2 sessions to provide diet modification and
Spoke with Michelle Mandujano in lab, she said that Rapid covid  and Flu test are negative. lab unable to put results in computer.
safety   Modified Cavalier    Balance Sitting:     Static:  fair+    Dynamic:fair  Standing: fair- Sitting:   Static: independent   Dynamic: supervision   Standing: supervision  Sitting:     Static:  good    Dynamic:fair+  Standing: fair   Activity Tolerance Vitals with activity: room air; 159/9 HR 77; 168/95 HR76   Standing tolerance 2min Fair+  Increase standing tolerance for >3min with stable vital signs for carry over into toileting, functional tranfers and indep in ADLs   Visual/  Perceptual Glasses: Present; WFL     Reports change in vision since admission: No      NA       Comments: Upon arrival pt supine in bed, agreeable to therapy session. Pt educated with regards to bed mobility, functional transfers, functional mobility, hand placement, safety awareness, walker safety, LE dressing, bathing tasks, importance of increased activity. At end of session pt seated EOB with wife present,  all lines and tubes intact, call light within reach. Pt has made good  progress towards set goals.    Continue with current plan of care      Treatment Time QK:[id number]             Treatment Time Out: 1000                Treatment Charges: Mins Units   Ther Ex  [id number]     Manual Therapy [id number]     Thera Activities 23379 13 1   ADL/Home Mgt 04022 10 1   Neuro Re-ed 15366     Group Therapy      Orthotic manage/training  88812     Non-Billable Time     Total Timed Treatment 23 [address] [facility] CHAN/L [id number]

## 2023-08-01 NOTE — DISCHARGE SUMMARY
adjustment of the mA/kV was utilized to reduce the radiation dose to as low as reasonably achievable. COMPARISON: CTA neck 07/30/2023 HISTORY: ORDERING SYSTEM PROVIDED HISTORY: Further evaluation of masses associated with parotid gland FINDINGS: PHARYNX/LARYNX:  The palatine tonsils are normal in appearance. The tongue is normal in appearance. The valleculae, epiglottis, aryepiglottic folds and pyriform sinuses appear unremarkable. The true and false vocal cords are normal in appearance. No mass or abscess is seen. SALIVARY GLANDS/THYROID:  The submandibular glands appear unremarkable. The thyroid gland appears unremarkable. Again seen is a dominant a 2.9 x 1.1 cm soft tissue mass within the left parotid gland, as well as a dominant 2.1 cm mass within the right deep parotid space. Additional smaller soft tissue nodules within the bilateral parotid glands. LYMPH NODES:  No cervical or supraclavicular lymphadenopathy is seen. SOFT TISSUES:  No appreciable soft tissue swelling is seen. BRAIN/ORBITS/SINUSES:  The visualized portion of the intracranial contents appear unremarkable. The visualized portion of the orbits, and mastoid air cells demonstrate no acute abnormality. Moderate mucosal inflammatory changes throughout scattered ethmoid air cells. LUNG APICES/SUPERIOR MEDIASTINUM:  No focal consolidation is seen within the visualized lung apices. No superior mediastinal lymphadenopathy or mass. The visualized portion of the trachea appears unremarkable. BONES:  No aggressive appearing lytic or blastic bony lesion. Bilateral parotid masses again seen without significant change compared to study from 07/30/2023. Findings are suspicious for primary parotid neoplasms or pathologic lymph nodes.      CT CHEST W CONTRAST    Result Date: 8/1/2023  EXAMINATION: CT OF THE CHEST WITH CONTRAST 8/1/2023 6:58 am TECHNIQUE: CT of the chest was performed with the administration of intravenous contrast. Multiplanar

## 2023-08-02 LAB
INFLUENZA A BY PCR: NOT DETECTED
INFLUENZA B BY PCR: NOT DETECTED
SARS-COV-2 RDRP RESP QL NAA+PROBE: NOT DETECTED
SPECIMEN DESCRIPTION: NORMAL

## 2024-12-03 ENCOUNTER — TELEPHONE (OUTPATIENT)
Dept: ENT CLINIC | Age: 74
End: 2024-12-03

## 2024-12-03 DIAGNOSIS — R22.1 NECK MASS: Primary | ICD-10-CM

## 2024-12-03 DIAGNOSIS — K11.8 PAROTID MASS: ICD-10-CM

## 2024-12-12 NOTE — TELEPHONE ENCOUNTER
Spoke with Dr Potocki's office to inform that patient's # is not working and emergency contact has same #.  Dr Potocki's office has the same # that our office has.  Referral being closed

## 2024-12-13 ENCOUNTER — APPOINTMENT (OUTPATIENT)
Dept: OTOLARYNGOLOGY | Facility: CLINIC | Age: 74
End: 2024-12-13
Payer: MEDICARE

## 2024-12-13 VITALS — WEIGHT: 245 LBS | BODY MASS INDEX: 32.47 KG/M2 | HEIGHT: 73 IN

## 2024-12-13 DIAGNOSIS — K11.8 MASS OF LEFT PAROTID GLAND: ICD-10-CM

## 2024-12-13 DIAGNOSIS — R60.9 PAROTID SWELLING: ICD-10-CM

## 2024-12-13 PROCEDURE — 1159F MED LIST DOCD IN RCRD: CPT | Performed by: OTOLARYNGOLOGY

## 2024-12-13 PROCEDURE — 1160F RVW MEDS BY RX/DR IN RCRD: CPT | Performed by: OTOLARYNGOLOGY

## 2024-12-13 PROCEDURE — 99213 OFFICE O/P EST LOW 20 MIN: CPT | Performed by: OTOLARYNGOLOGY

## 2024-12-13 RX ORDER — METFORMIN HYDROCHLORIDE 1000 MG/1
1000 TABLET ORAL 2 TIMES DAILY
COMMUNITY

## 2024-12-13 RX ORDER — ASPIRIN 81 MG/1
81 TABLET ORAL DAILY
COMMUNITY

## 2024-12-13 RX ORDER — METOPROLOL TARTRATE 25 MG/1
25 TABLET, FILM COATED ORAL DAILY
COMMUNITY

## 2024-12-13 RX ORDER — GLIPIZIDE 5 MG/1
5 TABLET ORAL 2 TIMES DAILY
COMMUNITY

## 2024-12-13 RX ORDER — AMOXICILLIN AND CLAVULANATE POTASSIUM 875; 125 MG/1; MG/1
1 TABLET, FILM COATED ORAL 2 TIMES DAILY
Qty: 28 TABLET | Refills: 0 | Status: SHIPPED | OUTPATIENT
Start: 2024-12-13 | End: 2024-12-27

## 2024-12-13 RX ORDER — PANTOPRAZOLE SODIUM 40 MG/1
40 TABLET, DELAYED RELEASE ORAL DAILY
COMMUNITY

## 2024-12-13 RX ORDER — TRAMADOL HYDROCHLORIDE 50 MG/1
50 TABLET ORAL EVERY 4 HOURS PRN
Qty: 18 TABLET | Refills: 0 | Status: SHIPPED | OUTPATIENT
Start: 2024-12-13 | End: 2024-12-16

## 2024-12-13 RX ORDER — OMEGA-3/DHA/EPA/FISH OIL 100-400 MG
1000 CAPSULE ORAL DAILY
COMMUNITY

## 2024-12-13 ASSESSMENT — PATIENT HEALTH QUESTIONNAIRE - PHQ9
SUM OF ALL RESPONSES TO PHQ9 QUESTIONS 1 AND 2: 0
2. FEELING DOWN, DEPRESSED OR HOPELESS: NOT AT ALL
1. LITTLE INTEREST OR PLEASURE IN DOING THINGS: NOT AT ALL

## 2024-12-13 NOTE — PROGRESS NOTES
CC: Left parotid gland swelling     Consulted by: Christal Garrett DO    HPI:    Known bilateral parotid gland tumors for many years.     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 Hospital course complicated by COPD. Required 5 day admission     Seen in ED 3 weeks ago for left side parotid swelling and pain. Received pain meds and ED performed CT scan. No admission, no home going Abx    Presents today with 5cm of swelling in tail of parotid. ITender to touch. Referred otalgia and odynophagia.  Has not been on Abx.        Social history: Current smoker.  0.5-1 pack/day.    Family history:  Reviewed and not relevant to the presenting complaint    Current medications:      Reviewed as noted in current orders     Allergies:                               Reviewed and as noted in current orders    ROS:  All other systems have been reviewed and are negative for complaint. I personally reviewed the intake form that was scanned in today    PE:  CONSTITUTIONAL:  Vitals  -reviewed from intake field, well developed, well nourished.    VOICE:    RESPIRATION:  Breathing comfortably, no stridor.  CV:  No clubbing/cyanosis/edema in hands.  EYES:  EOM Intact, sclera normal.  NEURO:  Alert and oriented times 3, Cranial nerves II-XII intact and symmetric bilaterally.  HEAD AND FACE: Large 5 cm mass in the tail of left parotid.  Tender to palpation with surrounding erythema.  No facial weakness.  No pus extruded from Stensen's duct.  EARS:  Normal external ears, external auditory canals, and TMs to otoscopy, normal hearing to whispered voice.  NOSE:  External nose midline, anterior rhinoscopy is normal with limited visualization to the anterior aspect of the inferior turbinates.  No lesions noted.    ORAL CAVITY/OROPHARYNX/LIPS:  Normal mucous membranes, normal floor of mouth/tongue/OP, no masses or lesions are  noted.  PHARYNGEAL WALLS AND NASOPHARYNX:  No masses noted. Mucosa appears clean and moist  NECK/LYMPH:  No LAD, no thyroid masses. Trachea palpably midline  SKIN: Evidence of previous bilateral parotidectomy.  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      The above resident note has been reviewed and confirmed.  Patient was seen and examined with the resident today.  Documentation has been reviewed.       (0) independent

## 2024-12-16 ENCOUNTER — APPOINTMENT (OUTPATIENT)
Dept: OTOLARYNGOLOGY | Facility: CLINIC | Age: 74
End: 2024-12-16
Payer: MEDICARE

## 2024-12-16 PROBLEM — K11.8 MASS OF LEFT PAROTID GLAND: Status: ACTIVE | Noted: 2024-12-16

## 2024-12-16 PROBLEM — R60.9 PAROTID SWELLING: Status: ACTIVE | Noted: 2024-12-16

## 2025-01-21 ENCOUNTER — TELEPHONE (OUTPATIENT)
Dept: OTOLARYNGOLOGY | Facility: HOSPITAL | Age: 75
End: 2025-01-21
Payer: MEDICARE

## 2025-01-21 DIAGNOSIS — R60.9 PAROTID SWELLING: ICD-10-CM

## 2025-01-21 RX ORDER — AMOXICILLIN AND CLAVULANATE POTASSIUM 875; 125 MG/1; MG/1
1 TABLET, FILM COATED ORAL 2 TIMES DAILY
Qty: 28 TABLET | Refills: 0 | Status: SHIPPED | OUTPATIENT
Start: 2025-01-21 | End: 2025-02-04

## 2025-01-21 RX ORDER — IBUPROFEN 600 MG/1
600 TABLET ORAL 3 TIMES DAILY
Qty: 42 TABLET | Refills: 0 | Status: SHIPPED | OUTPATIENT
Start: 2025-01-21 | End: 2025-02-04

## 2025-01-21 RX ORDER — GABAPENTIN 300 MG/1
300 CAPSULE ORAL 3 TIMES DAILY
Qty: 42 CAPSULE | Refills: 0 | Status: SHIPPED | OUTPATIENT
Start: 2025-01-21 | End: 2025-02-04

## 2025-01-21 NOTE — TELEPHONE ENCOUNTER
Patient's face is swollen again, he can hardly talk.  Can you prescribe another round of ATB and something else for pain as Tramadol is not working now.  Please advise.

## 2025-01-21 NOTE — TELEPHONE ENCOUNTER
Per Dr. Jang, will prescribe another course of antibiotic, gabapentin and ibuprofen.  Family member aware.

## 2025-01-23 ENCOUNTER — APPOINTMENT (OUTPATIENT)
Dept: CT IMAGING | Age: 75
End: 2025-01-23
Payer: MEDICARE

## 2025-01-23 ENCOUNTER — TELEPHONE (OUTPATIENT)
Dept: OTOLARYNGOLOGY | Facility: HOSPITAL | Age: 75
End: 2025-01-23
Payer: MEDICARE

## 2025-01-23 ENCOUNTER — HOSPITAL ENCOUNTER (INPATIENT)
Age: 75
LOS: 2 days | Discharge: HOME | End: 2025-01-25
Attending: EMERGENCY MEDICINE | Admitting: INTERNAL MEDICINE
Payer: MEDICARE

## 2025-01-23 DIAGNOSIS — K11.21 ACUTE SUPPURATIVE PAROTITIS: Primary | ICD-10-CM

## 2025-01-23 LAB
ANION GAP SERPL CALCULATED.3IONS-SCNC: 17 MMOL/L (ref 7–16)
BASOPHILS # BLD: 0.06 K/UL (ref 0–0.2)
BASOPHILS NFR BLD: 1 % (ref 0–2)
BUN SERPL-MCNC: 19 MG/DL (ref 6–23)
CALCIUM SERPL-MCNC: 9.5 MG/DL (ref 8.6–10.2)
CHLORIDE SERPL-SCNC: 100 MMOL/L (ref 98–107)
CO2 SERPL-SCNC: 21 MMOL/L (ref 22–29)
CREAT SERPL-MCNC: 1.6 MG/DL (ref 0.7–1.2)
EOSINOPHIL # BLD: 0.18 K/UL (ref 0.05–0.5)
EOSINOPHILS RELATIVE PERCENT: 3 % (ref 0–6)
ERYTHROCYTE [DISTWIDTH] IN BLOOD BY AUTOMATED COUNT: 14.4 % (ref 11.5–15)
GFR, ESTIMATED: 43 ML/MIN/1.73M2
GLUCOSE SERPL-MCNC: 202 MG/DL (ref 74–99)
HCT VFR BLD AUTO: 37.3 % (ref 37–54)
HGB BLD-MCNC: 12.4 G/DL (ref 12.5–16.5)
IMM GRANULOCYTES # BLD AUTO: 0.03 K/UL (ref 0–0.58)
IMM GRANULOCYTES NFR BLD: 0 % (ref 0–5)
INR PPP: 1.3
LACTATE BLDV-SCNC: 3.3 MMOL/L (ref 0.5–2.2)
LYMPHOCYTES NFR BLD: 1.14 K/UL (ref 1.5–4)
LYMPHOCYTES RELATIVE PERCENT: 16 % (ref 20–42)
MAGNESIUM SERPL-MCNC: 2 MG/DL (ref 1.6–2.6)
MCH RBC QN AUTO: 26.6 PG (ref 26–35)
MCHC RBC AUTO-ENTMCNC: 33.2 G/DL (ref 32–34.5)
MCV RBC AUTO: 80 FL (ref 80–99.9)
MONOCYTES NFR BLD: 0.8 K/UL (ref 0.1–0.95)
MONOCYTES NFR BLD: 11 % (ref 2–12)
NEUTROPHILS NFR BLD: 69 % (ref 43–80)
NEUTS SEG NFR BLD: 5.01 K/UL (ref 1.8–7.3)
PLATELET # BLD AUTO: 213 K/UL (ref 130–450)
PMV BLD AUTO: 10.6 FL (ref 7–12)
POTASSIUM SERPL-SCNC: 4.4 MMOL/L (ref 3.5–5)
PROTHROMBIN TIME: 14.2 SEC (ref 9.3–12.4)
RBC # BLD AUTO: 4.66 M/UL (ref 3.8–5.8)
SODIUM SERPL-SCNC: 138 MMOL/L (ref 132–146)
WBC OTHER # BLD: 7.2 K/UL (ref 4.5–11.5)

## 2025-01-23 PROCEDURE — 99221 1ST HOSP IP/OBS SF/LOW 40: CPT | Performed by: OTOLARYNGOLOGY

## 2025-01-23 PROCEDURE — 2060000000 HC ICU INTERMEDIATE R&B

## 2025-01-23 PROCEDURE — 2580000003 HC RX 258: Performed by: EMERGENCY MEDICINE

## 2025-01-23 PROCEDURE — 85610 PROTHROMBIN TIME: CPT

## 2025-01-23 PROCEDURE — 96375 TX/PRO/DX INJ NEW DRUG ADDON: CPT

## 2025-01-23 PROCEDURE — 99285 EMERGENCY DEPT VISIT HI MDM: CPT

## 2025-01-23 PROCEDURE — 83605 ASSAY OF LACTIC ACID: CPT

## 2025-01-23 PROCEDURE — 87040 BLOOD CULTURE FOR BACTERIA: CPT

## 2025-01-23 PROCEDURE — 96365 THER/PROPH/DIAG IV INF INIT: CPT

## 2025-01-23 PROCEDURE — 85025 COMPLETE CBC W/AUTO DIFF WBC: CPT

## 2025-01-23 PROCEDURE — 31575 DIAGNOSTIC LARYNGOSCOPY: CPT | Performed by: OTOLARYNGOLOGY

## 2025-01-23 PROCEDURE — 96376 TX/PRO/DX INJ SAME DRUG ADON: CPT

## 2025-01-23 PROCEDURE — 6360000002 HC RX W HCPCS: Performed by: EMERGENCY MEDICINE

## 2025-01-23 PROCEDURE — 70490 CT SOFT TISSUE NECK W/O DYE: CPT

## 2025-01-23 PROCEDURE — 83735 ASSAY OF MAGNESIUM: CPT

## 2025-01-23 PROCEDURE — 80048 BASIC METABOLIC PNL TOTAL CA: CPT

## 2025-01-23 PROCEDURE — 96366 THER/PROPH/DIAG IV INF ADDON: CPT

## 2025-01-23 RX ORDER — SODIUM CHLORIDE 9 MG/ML
INJECTION, SOLUTION INTRAVENOUS CONTINUOUS
Status: DISCONTINUED | OUTPATIENT
Start: 2025-01-23 | End: 2025-01-24

## 2025-01-23 RX ORDER — FENTANYL CITRATE 50 UG/ML
50 INJECTION, SOLUTION INTRAMUSCULAR; INTRAVENOUS ONCE
Status: COMPLETED | OUTPATIENT
Start: 2025-01-23 | End: 2025-01-23

## 2025-01-23 RX ORDER — 0.9 % SODIUM CHLORIDE 0.9 %
30 INTRAVENOUS SOLUTION INTRAVENOUS ONCE
Status: COMPLETED | OUTPATIENT
Start: 2025-01-23 | End: 2025-01-23

## 2025-01-23 RX ORDER — GABAPENTIN 300 MG/1
300 CAPSULE ORAL 3 TIMES DAILY
COMMUNITY

## 2025-01-23 RX ORDER — IOPAMIDOL 755 MG/ML
75 INJECTION, SOLUTION INTRAVASCULAR
Status: DISCONTINUED | OUTPATIENT
Start: 2025-01-23 | End: 2025-01-23

## 2025-01-23 RX ORDER — GLYCOPYRROLATE 0.2 MG/ML
0.2 INJECTION INTRAMUSCULAR; INTRAVENOUS ONCE
Status: DISCONTINUED | OUTPATIENT
Start: 2025-01-23 | End: 2025-01-25 | Stop reason: HOSPADM

## 2025-01-23 RX ORDER — ONDANSETRON 2 MG/ML
4 INJECTION INTRAMUSCULAR; INTRAVENOUS ONCE
Status: COMPLETED | OUTPATIENT
Start: 2025-01-23 | End: 2025-01-23

## 2025-01-23 RX ORDER — AMOXICILLIN 875 MG/1
875 TABLET, COATED ORAL 2 TIMES DAILY
Status: ON HOLD | COMMUNITY
End: 2025-01-24 | Stop reason: HOSPADM

## 2025-01-23 RX ORDER — FENTANYL CITRATE 50 UG/ML
50 INJECTION, SOLUTION INTRAMUSCULAR; INTRAVENOUS
Status: DISCONTINUED | OUTPATIENT
Start: 2025-01-23 | End: 2025-01-25 | Stop reason: HOSPADM

## 2025-01-23 RX ORDER — DEXAMETHASONE SODIUM PHOSPHATE 10 MG/ML
10 INJECTION INTRAMUSCULAR; INTRAVENOUS EVERY 8 HOURS
Status: DISCONTINUED | OUTPATIENT
Start: 2025-01-23 | End: 2025-01-24 | Stop reason: SDUPTHER

## 2025-01-23 RX ORDER — CLINDAMYCIN PHOSPHATE 600 MG/50ML
600 INJECTION, SOLUTION INTRAVENOUS ONCE
Status: DISCONTINUED | OUTPATIENT
Start: 2025-01-23 | End: 2025-01-23

## 2025-01-23 RX ORDER — ONDANSETRON 2 MG/ML
4 INJECTION INTRAMUSCULAR; INTRAVENOUS EVERY 6 HOURS PRN
Status: DISCONTINUED | OUTPATIENT
Start: 2025-01-23 | End: 2025-01-24

## 2025-01-23 RX ORDER — DEXAMETHASONE SODIUM PHOSPHATE 10 MG/ML
10 INJECTION INTRAMUSCULAR; INTRAVENOUS ONCE
Status: COMPLETED | OUTPATIENT
Start: 2025-01-23 | End: 2025-01-23

## 2025-01-23 RX ORDER — TRAMADOL HYDROCHLORIDE 50 MG/1
50 TABLET ORAL EVERY 6 HOURS PRN
COMMUNITY

## 2025-01-23 RX ORDER — IBUPROFEN 600 MG/1
600 TABLET, FILM COATED ORAL EVERY 6 HOURS PRN
COMMUNITY

## 2025-01-23 RX ADMIN — ONDANSETRON 4 MG: 2 INJECTION, SOLUTION INTRAMUSCULAR; INTRAVENOUS at 19:39

## 2025-01-23 RX ADMIN — FENTANYL CITRATE 50 MCG: 50 INJECTION INTRAMUSCULAR; INTRAVENOUS at 11:49

## 2025-01-23 RX ADMIN — AMPICILLIN SODIUM AND SULBACTAM SODIUM 3000 MG: 2; 1 INJECTION, POWDER, FOR SOLUTION INTRAMUSCULAR; INTRAVENOUS at 19:32

## 2025-01-23 RX ADMIN — SODIUM CHLORIDE 3270 ML: 9 INJECTION, SOLUTION INTRAVENOUS at 14:45

## 2025-01-23 RX ADMIN — FENTANYL CITRATE 50 MCG: 50 INJECTION INTRAMUSCULAR; INTRAVENOUS at 19:39

## 2025-01-23 RX ADMIN — FENTANYL CITRATE 50 MCG: 50 INJECTION INTRAMUSCULAR; INTRAVENOUS at 12:37

## 2025-01-23 RX ADMIN — AMPICILLIN SODIUM AND SULBACTAM SODIUM 3000 MG: 2; 1 INJECTION, POWDER, FOR SOLUTION INTRAMUSCULAR; INTRAVENOUS at 15:02

## 2025-01-23 RX ADMIN — ONDANSETRON 4 MG: 2 INJECTION, SOLUTION INTRAMUSCULAR; INTRAVENOUS at 11:49

## 2025-01-23 RX ADMIN — DEXAMETHASONE SODIUM PHOSPHATE 10 MG: 10 INJECTION INTRAMUSCULAR; INTRAVENOUS at 17:52

## 2025-01-23 RX ADMIN — SODIUM CHLORIDE: 9 INJECTION, SOLUTION INTRAVENOUS at 19:27

## 2025-01-23 ASSESSMENT — ENCOUNTER SYMPTOMS
VOICE CHANGE: 0
TROUBLE SWALLOWING: 1
FACIAL SWELLING: 1
SORE THROAT: 1

## 2025-01-23 ASSESSMENT — PAIN DESCRIPTION - LOCATION
LOCATION: THROAT;FACE
LOCATION: FACE

## 2025-01-23 ASSESSMENT — PAIN DESCRIPTION - ORIENTATION
ORIENTATION: LEFT

## 2025-01-23 ASSESSMENT — PAIN DESCRIPTION - PAIN TYPE: TYPE: ACUTE PAIN

## 2025-01-23 ASSESSMENT — PAIN - FUNCTIONAL ASSESSMENT
PAIN_FUNCTIONAL_ASSESSMENT: 0-10
PAIN_FUNCTIONAL_ASSESSMENT: 0-10
PAIN_FUNCTIONAL_ASSESSMENT: NONE - DENIES PAIN

## 2025-01-23 ASSESSMENT — PAIN SCALES - GENERAL
PAINLEVEL_OUTOF10: 8
PAINLEVEL_OUTOF10: 10

## 2025-01-23 ASSESSMENT — PAIN DESCRIPTION - DESCRIPTORS
DESCRIPTORS: DISCOMFORT
DESCRIPTORS: DISCOMFORT
DESCRIPTORS: ACHING;DISCOMFORT;SORE;PRESSURE
DESCRIPTORS: ACHING;DISCOMFORT;SHARP

## 2025-01-23 ASSESSMENT — LIFESTYLE VARIABLES
HOW OFTEN DO YOU HAVE A DRINK CONTAINING ALCOHOL: NEVER
HOW MANY STANDARD DRINKS CONTAINING ALCOHOL DO YOU HAVE ON A TYPICAL DAY: PATIENT DOES NOT DRINK

## 2025-01-23 ASSESSMENT — PAIN DESCRIPTION - FREQUENCY: FREQUENCY: CONTINUOUS

## 2025-01-23 ASSESSMENT — PAIN DESCRIPTION - ONSET: ONSET: ON-GOING

## 2025-01-23 NOTE — TELEPHONE ENCOUNTER
Received call from patient daughter. She states the swelling is now towards his throat and patient does endorse difficulty breathing.  She was directed to take him to the EW.

## 2025-01-23 NOTE — ED PROVIDER NOTES
HPI:  1/23/25,   Time: 11:24 AM EST       Eric Zarco is a 74 y.o. male presenting to the ED for left facial swelling into jaw, beginning hrs ago.  The complaint has been persistent, moderate in severity, and worsened by nothing.  Per masses.  Increased swelling left side now going into jaw under her chin.  Pain.  No fevers.  Some difficulty breathing.  Brought in by private vehicle.  Followed by ENT at .    Review of Systems:   Pertinent positives and negatives are stated within HPI, all other systems reviewed and are negative.          --------------------------------------------- PAST HISTORY ---------------------------------------------  Past Medical History:  has a past medical history of CAD (coronary artery disease), COPD (chronic obstructive pulmonary disease) (HCC), Diabetes mellitus (HCC), Gastric ulcer, Hypertension, and Sleep apnea.    Past Surgical History:  has a past surgical history that includes Colonoscopy; Endoscopy, colon, diagnostic; Cataract removal (Left, 08/15/2017); other surgical history; Appendectomy; fracture surgery; Cataract removal with implant (Right, 09/18/2018); and pr xcapsl ctrc rmvl insj io lens prosth w/o ecp (Right, 9/18/2018).    Social History:  reports that he has been smoking cigarettes. He has a 25 pack-year smoking history. He has never used smokeless tobacco. He reports that he does not currently use alcohol. He reports that he does not use drugs.    Family History: family history includes Cancer in his mother; Diabetes in his father; Heart Attack (age of onset: 55) in his brother; Heart Attack (age of onset: 63) in his father.     The patient’s home medications have been reviewed.    Allergies: Patient has no known allergies.        ---------------------------------------------------PHYSICAL EXAM--------------------------------------    Constitutional/General: Alert and oriented x3, well appearing, non toxic in NAD  Head: Left-sided facial mass that is tender no  of the left middle ear cavity. Complete   opacification of the left mastoid air cells. Clinical correlation for   OTOMASTOIDITIS is recommended.   4. Moderate mucosal thickening in the ethmoid sinuses with evidence of   sinonasal polyposis.             EKG:  This EKG is signed and interpreted by the EP.    Time:   Rate:   Rhythm: Interpretation:   Comparison:       ------------------------- NURSING NOTES AND VITALS REVIEWED ---------------------------   The nursing notes within the ED encounter and vital signs as below have been reviewed by myself.  BP (!) 177/90   Pulse 85   Temp 98.3 °F (36.8 °C) (Temporal)   Resp 20   Ht 1.854 m (6' 1\")   Wt 109 kg (240 lb 4.8 oz)   SpO2 98%   BMI 31.70 kg/m²   Oxygen Saturation Interpretation: Normal    The patient’s available past medical records and past encounters were reviewed.        ------------------------------ ED COURSE/MEDICAL DECISION MAKING----------------------  Medications   glycopyrrolate (ROBINUL) injection 0.2 mg (0.2 mg IntraVENous Not Given 1/24/25 0525)   fentaNYL (SUBLIMAZE) injection 50 mcg (50 mcg IntraVENous Given 1/23/25 1939)   aspirin EC tablet 81 mg (81 mg Oral Given 1/24/25 0856)   atorvastatin (LIPITOR) tablet 40 mg (has no administration in time range)   fluticasone (FLONASE) 50 MCG/ACT nasal spray 2 spray (has no administration in time range)   gabapentin (NEURONTIN) capsule 300 mg (300 mg Oral Given 1/24/25 0856)   isosorbide mononitrate (IMDUR) extended release tablet 30 mg (30 mg Oral Given 1/24/25 0856)   metoprolol tartrate (LOPRESSOR) tablet 25 mg (25 mg Oral Given 1/24/25 0856)   pantoprazole (PROTONIX) tablet 40 mg (40 mg Oral Given 1/24/25 0856)   traMADol (ULTRAM) tablet 50 mg (50 mg Oral Given 1/24/25 0856)   sodium chloride flush 0.9 % injection 5-40 mL (10 mLs IntraVENous Given 1/24/25 0904)   sodium chloride flush 0.9 % injection 5-40 mL (10 mLs IntraVENous Given 1/24/25 0857)   0.9 % sodium chloride infusion (has no

## 2025-01-23 NOTE — CONSULTS
OTOLARYNGOLOGY  CONSULT NOTE  1/23/2025    Physician Consulted: Dr. Maravilla  Reason for Consult: infected left parotid mass  Referring Physician: Dr. Leonel OCHOA  Eric Zarco is a 74 y.o. male with a history of COPD, HTN, ADEEL non-compliant with CPAP and prior parotid masses/parotidectomy who ENT was consulted for evaluation of infected left parotid mass. Per chart review, he has a prior h/o left superficial parotid lobe Warthin's which was excised in the past, s/p removal of large deep lobe right parotid warthin's tumor by Dr. Tatum at  in 2021 with hospital course complicated by COPD. He was seen by Dr. Tatum on 12/16/24 and treated for infected Warthin's tumor with Augmentin and MRI was ordered.     Patient and his wife report persistent left facial swelling and pain for the past month which progressed over the past day. Now reports some trouble swallowing and pain. Denies any SOB currently or trouble laying flat.     In ED, WBC 7.2, Lactic acid 3.3, CT neck with large left parotid mass with surrounding edema and fat stranding, opacified left mastoid air cells, evidence of sinonasal polyposis. Received Unasyn and Decadron 10 mg.     Review of Systems   HENT:  Positive for facial swelling, sore throat and trouble swallowing. Negative for voice change.    All other systems reviewed and are negative.      Past Medical History:   Diagnosis Date    CAD (coronary artery disease)     has had 2 cardiac caths   states has small blockages    COPD (chronic obstructive pulmonary disease) (HCC)     Diabetes mellitus (HCC)     Gastric ulcer     surgery in 1983    Hypertension     Sleep apnea     doesnt use his cpap       Past Surgical History:   Procedure Laterality Date    APPENDECTOMY      CATARACT REMOVAL Left 08/15/2017    left eye cataract extraction [facility]    CATARACT REMOVAL WITH IMPLANT Right 09/18/2018    COLONOSCOPY      ENDOSCOPY, COLON, DIAGNOSTIC      FRACTURE SURGERY      jaw    OTHER SURGICAL HISTORY            Heart Attack Brother 55        MI        Social History     Tobacco Use    Smoking status: Every Day     Current packs/day: 0.50     Average packs/day: 0.5 packs/day for 50.0 years (25.0 ttl pk-yrs)     Types: Cigarettes    Smokeless tobacco: Never    Tobacco comments:     currently 0.5 ppd, 21   Vaping Use    Vaping status: Never Used   Substance Use Topics    Alcohol use: Not Currently     Comment: no alcohol since  2014    Drug use: Never           PHYSICAL EXAM:    Vitals:    25 1622   BP: (!) 161/84   Pulse: 79   Resp: 16   Temp: 97.6 °F (36.4 °C)   SpO2: 94%       General Appearance:  Laying in bed, awake, alert, no apparent distress, voice somewhat garbled  Head/face:  NC/AT  Eyes: PERRL, EOMI  ENT: Bilateral external ears WNL, Nares with obstructive nasal polyposis bilaterally at the level of inferior turbinate, Septum midline, tongue soft, uvula midline with pedunculated papilloma, tolerating oral secretion, no purulence expressed from parotid duct or Kiera's ducts. Severely indurated left parotid gland with mild fluctuance and very TTP. No mastoid tenderness, TM intact bilaterally with left middle ear effusion  Neck:Supple, shotty adenopathy on the left with associated TTP  Lungs:  Non-labored, good respiratory effort, no stridor, on room air  Heart:  RR  Neuro: Facial nerve symmetric and intact. House Brackmann 1/6, bilaterally.       LABS:  CBC  Recent Labs     25  1130   WBC 7.2   HGB 12.4*   HCT 37.3          RADIOLOGY  CT SOFT TISSUE NECK WO CONTRAST    Result Date: 2025  EXAMINATION: CT OF THE NECK WITHOUT CONTRAST  2025 TECHNIQUE: CT of the neck was performed without the administration of intravenous contrast. Multiplanar reformatted images are provided for review. Automated exposure control, iterative reconstruction, and/or weight based adjustment of the mA/kV was utilized to reduce the radiation dose to as low as reasonably achievable.  COMPARISON: CT of the neck with and without contrast, 07/31/2023. HISTORY: ORDERING SYSTEM PROVIDED HISTORY: left parotid enlargement TECHNOLOGIST PROVIDED HISTORY: Reason for exam:->left parotid enlargement Decision Support Exception - unselect if not a suspected or confirmed emergency medical condition->Emergency Medical Condition (MA) FINDINGS: PHARYNX/LARYNX:  There is asymmetric enlargement of the left palatine tonsil. The tongue is normal in appearance.  The valleculae, epiglottis, aryepiglottic folds and pyriform sinuses appear unremarkable.  The true and false vocal cords are normal in appearance.  No mass or abscess is seen. SALIVARY GLANDS/THYROID:  A mass lesion is seen in the left parotid gland, the margins of which are indistinct.  Given the surrounding the edema which extends from the left parotid gland into the subcutaneous fat, apparent enlargement of the mass since 07/31/2023 may be real or artifactual, given the surrounding edema.  Contrast enhanced CT would allow for distinction between the mass and edema in the parotid gland. Additional smaller 2 nodules along the left parotid tail are grossly stable, with the larger measuring approximately 1.5 cm.  Scarring in the right parotid gland indicates prior surgical resection of mass.  The submandibular gland is unremarkable. LYMPH NODES:  No cervical or supraclavicular lymphadenopathy is seen. SOFT TISSUES:  No appreciable soft tissue swelling or mass is seen. BRAIN/ORBITS/SINUSES:  The visualized portion of the intracranial contents appear unremarkable.  The partially visualized orbits are unremarkable. Moderate mucosal thickening in the ethmoid sinuses with evidence of sinonasal polyposis.  Partial opacification of the left middle ear cavity.  Complete opacification the left mastoid air cells. LUNG APICES/SUPERIOR MEDIASTINUM:  No focal consolidation is seen within the visualized lung apices.  No superior mediastinal lymphadenopathy or mass. The

## 2025-01-24 PROBLEM — K11.8 PAROTID MASS: Status: ACTIVE | Noted: 2025-01-24

## 2025-01-24 PROBLEM — K11.3 PAROTID ABSCESS: Status: ACTIVE | Noted: 2025-01-24

## 2025-01-24 LAB
GLUCOSE BLD-MCNC: 238 MG/DL (ref 74–99)
GLUCOSE BLD-MCNC: 255 MG/DL (ref 74–99)
GLUCOSE BLD-MCNC: 256 MG/DL (ref 74–99)
GLUCOSE BLD-MCNC: 285 MG/DL (ref 74–99)
HBA1C MFR BLD: 5.3 % (ref 4–5.6)

## 2025-01-24 PROCEDURE — 36415 COLL VENOUS BLD VENIPUNCTURE: CPT

## 2025-01-24 PROCEDURE — 2580000003 HC RX 258: Performed by: EMERGENCY MEDICINE

## 2025-01-24 PROCEDURE — 2500000003 HC RX 250 WO HCPCS: Performed by: NURSE PRACTITIONER

## 2025-01-24 PROCEDURE — 2580000003 HC RX 258: Performed by: NURSE PRACTITIONER

## 2025-01-24 PROCEDURE — 6370000000 HC RX 637 (ALT 250 FOR IP): Performed by: STUDENT IN AN ORGANIZED HEALTH CARE EDUCATION/TRAINING PROGRAM

## 2025-01-24 PROCEDURE — 6370000000 HC RX 637 (ALT 250 FOR IP): Performed by: NURSE PRACTITIONER

## 2025-01-24 PROCEDURE — 99223 1ST HOSP IP/OBS HIGH 75: CPT | Performed by: NURSE PRACTITIONER

## 2025-01-24 PROCEDURE — 6360000002 HC RX W HCPCS: Performed by: NURSE PRACTITIONER

## 2025-01-24 PROCEDURE — 6360000002 HC RX W HCPCS: Performed by: EMERGENCY MEDICINE

## 2025-01-24 PROCEDURE — 2060000000 HC ICU INTERMEDIATE R&B

## 2025-01-24 PROCEDURE — 82962 GLUCOSE BLOOD TEST: CPT

## 2025-01-24 PROCEDURE — 83036 HEMOGLOBIN GLYCOSYLATED A1C: CPT

## 2025-01-24 RX ORDER — INSULIN LISPRO 100 [IU]/ML
0-4 INJECTION, SOLUTION INTRAVENOUS; SUBCUTANEOUS
Status: DISCONTINUED | OUTPATIENT
Start: 2025-01-24 | End: 2025-01-25 | Stop reason: HOSPADM

## 2025-01-24 RX ORDER — GABAPENTIN 300 MG/1
300 CAPSULE ORAL 3 TIMES DAILY
Status: DISCONTINUED | OUTPATIENT
Start: 2025-01-24 | End: 2025-01-25 | Stop reason: HOSPADM

## 2025-01-24 RX ORDER — ENOXAPARIN SODIUM 100 MG/ML
30 INJECTION SUBCUTANEOUS 2 TIMES DAILY
Status: DISCONTINUED | OUTPATIENT
Start: 2025-01-24 | End: 2025-01-25 | Stop reason: HOSPADM

## 2025-01-24 RX ORDER — POLYETHYLENE GLYCOL 3350 17 G/17G
17 POWDER, FOR SOLUTION ORAL DAILY PRN
Status: DISCONTINUED | OUTPATIENT
Start: 2025-01-24 | End: 2025-01-25 | Stop reason: HOSPADM

## 2025-01-24 RX ORDER — DEXAMETHASONE SODIUM PHOSPHATE 4 MG/ML
10 INJECTION, SOLUTION INTRA-ARTICULAR; INTRALESIONAL; INTRAMUSCULAR; INTRAVENOUS; SOFT TISSUE EVERY 8 HOURS
Status: DISCONTINUED | OUTPATIENT
Start: 2025-01-24 | End: 2025-01-25 | Stop reason: HOSPADM

## 2025-01-24 RX ORDER — OXYCODONE HYDROCHLORIDE 5 MG/1
5 TABLET ORAL EVERY 4 HOURS PRN
Status: DISCONTINUED | OUTPATIENT
Start: 2025-01-24 | End: 2025-01-25 | Stop reason: HOSPADM

## 2025-01-24 RX ORDER — TRAMADOL HYDROCHLORIDE 50 MG/1
50 TABLET ORAL EVERY 6 HOURS PRN
Status: DISCONTINUED | OUTPATIENT
Start: 2025-01-24 | End: 2025-01-25 | Stop reason: HOSPADM

## 2025-01-24 RX ORDER — GLUCAGON 1 MG/ML
1 KIT INJECTION PRN
Status: DISCONTINUED | OUTPATIENT
Start: 2025-01-24 | End: 2025-01-25 | Stop reason: HOSPADM

## 2025-01-24 RX ORDER — ISOSORBIDE MONONITRATE 30 MG/1
30 TABLET, EXTENDED RELEASE ORAL DAILY
Status: DISCONTINUED | OUTPATIENT
Start: 2025-01-24 | End: 2025-01-25 | Stop reason: HOSPADM

## 2025-01-24 RX ORDER — ACETAMINOPHEN 650 MG/1
650 SUPPOSITORY RECTAL EVERY 6 HOURS PRN
Status: DISCONTINUED | OUTPATIENT
Start: 2025-01-24 | End: 2025-01-25 | Stop reason: HOSPADM

## 2025-01-24 RX ORDER — ONDANSETRON 4 MG/1
4 TABLET, ORALLY DISINTEGRATING ORAL EVERY 8 HOURS PRN
Status: DISCONTINUED | OUTPATIENT
Start: 2025-01-24 | End: 2025-01-25 | Stop reason: HOSPADM

## 2025-01-24 RX ORDER — MAGNESIUM HYDROXIDE/ALUMINUM HYDROXICE/SIMETHICONE 120; 1200; 1200 MG/30ML; MG/30ML; MG/30ML
30 SUSPENSION ORAL EVERY 6 HOURS PRN
Status: DISCONTINUED | OUTPATIENT
Start: 2025-01-24 | End: 2025-01-25 | Stop reason: HOSPADM

## 2025-01-24 RX ORDER — METOPROLOL TARTRATE 25 MG/1
25 TABLET, FILM COATED ORAL DAILY
Status: DISCONTINUED | OUTPATIENT
Start: 2025-01-24 | End: 2025-01-25 | Stop reason: HOSPADM

## 2025-01-24 RX ORDER — MAGNESIUM SULFATE IN WATER 40 MG/ML
2000 INJECTION, SOLUTION INTRAVENOUS PRN
Status: DISCONTINUED | OUTPATIENT
Start: 2025-01-24 | End: 2025-01-25 | Stop reason: HOSPADM

## 2025-01-24 RX ORDER — SODIUM CHLORIDE 0.9 % (FLUSH) 0.9 %
5-40 SYRINGE (ML) INJECTION EVERY 12 HOURS SCHEDULED
Status: DISCONTINUED | OUTPATIENT
Start: 2025-01-24 | End: 2025-01-25 | Stop reason: HOSPADM

## 2025-01-24 RX ORDER — POTASSIUM CHLORIDE 1500 MG/1
40 TABLET, EXTENDED RELEASE ORAL PRN
Status: DISCONTINUED | OUTPATIENT
Start: 2025-01-24 | End: 2025-01-25 | Stop reason: HOSPADM

## 2025-01-24 RX ORDER — AMOXICILLIN AND CLAVULANATE POTASSIUM 562.5; 437.5; 62.5 MG/1; MG/1; MG/1
2 TABLET, MULTILAYER, EXTENDED RELEASE ORAL 2 TIMES DAILY
Qty: 28 TABLET | Refills: 0 | Status: SHIPPED | OUTPATIENT
Start: 2025-01-24 | End: 2025-01-31

## 2025-01-24 RX ORDER — DEXTROSE MONOHYDRATE 25 G/50ML
12.5 INJECTION, SOLUTION INTRAVENOUS PRN
Status: DISCONTINUED | OUTPATIENT
Start: 2025-01-24 | End: 2025-01-25 | Stop reason: HOSPADM

## 2025-01-24 RX ORDER — POTASSIUM CHLORIDE 7.45 MG/ML
10 INJECTION INTRAVENOUS PRN
Status: DISCONTINUED | OUTPATIENT
Start: 2025-01-24 | End: 2025-01-25 | Stop reason: HOSPADM

## 2025-01-24 RX ORDER — ACETAMINOPHEN 325 MG/1
650 TABLET ORAL EVERY 6 HOURS PRN
Status: DISCONTINUED | OUTPATIENT
Start: 2025-01-24 | End: 2025-01-25 | Stop reason: HOSPADM

## 2025-01-24 RX ORDER — PANTOPRAZOLE SODIUM 40 MG/1
40 TABLET, DELAYED RELEASE ORAL DAILY
Status: DISCONTINUED | OUTPATIENT
Start: 2025-01-24 | End: 2025-01-25 | Stop reason: HOSPADM

## 2025-01-24 RX ORDER — SODIUM CHLORIDE 0.9 % (FLUSH) 0.9 %
5-40 SYRINGE (ML) INJECTION PRN
Status: DISCONTINUED | OUTPATIENT
Start: 2025-01-24 | End: 2025-01-25 | Stop reason: HOSPADM

## 2025-01-24 RX ORDER — DEXTROSE MONOHYDRATE 100 MG/ML
INJECTION, SOLUTION INTRAVENOUS CONTINUOUS PRN
Status: DISCONTINUED | OUTPATIENT
Start: 2025-01-24 | End: 2025-01-25 | Stop reason: HOSPADM

## 2025-01-24 RX ORDER — ASPIRIN 81 MG/1
81 TABLET ORAL DAILY
Status: DISCONTINUED | OUTPATIENT
Start: 2025-01-24 | End: 2025-01-25 | Stop reason: HOSPADM

## 2025-01-24 RX ORDER — FLUTICASONE PROPIONATE 50 MCG
2 SPRAY, SUSPENSION (ML) NASAL DAILY PRN
Status: DISCONTINUED | OUTPATIENT
Start: 2025-01-24 | End: 2025-01-25 | Stop reason: HOSPADM

## 2025-01-24 RX ORDER — ONDANSETRON 2 MG/ML
4 INJECTION INTRAMUSCULAR; INTRAVENOUS EVERY 6 HOURS PRN
Status: DISCONTINUED | OUTPATIENT
Start: 2025-01-24 | End: 2025-01-25 | Stop reason: HOSPADM

## 2025-01-24 RX ORDER — DEXTROSE MONOHYDRATE 25 G/50ML
25 INJECTION, SOLUTION INTRAVENOUS PRN
Status: DISCONTINUED | OUTPATIENT
Start: 2025-01-24 | End: 2025-01-25 | Stop reason: HOSPADM

## 2025-01-24 RX ORDER — SODIUM CHLORIDE 9 MG/ML
INJECTION, SOLUTION INTRAVENOUS PRN
Status: DISCONTINUED | OUTPATIENT
Start: 2025-01-24 | End: 2025-01-25 | Stop reason: HOSPADM

## 2025-01-24 RX ORDER — ATORVASTATIN CALCIUM 40 MG/1
40 TABLET, FILM COATED ORAL NIGHTLY
Status: DISCONTINUED | OUTPATIENT
Start: 2025-01-24 | End: 2025-01-25 | Stop reason: HOSPADM

## 2025-01-24 RX ADMIN — INSULIN LISPRO 2 UNITS: 100 INJECTION, SOLUTION INTRAVENOUS; SUBCUTANEOUS at 18:05

## 2025-01-24 RX ADMIN — ENOXAPARIN SODIUM 30 MG: 100 INJECTION SUBCUTANEOUS at 08:56

## 2025-01-24 RX ADMIN — SODIUM CHLORIDE, PRESERVATIVE FREE 10 ML: 5 INJECTION INTRAVENOUS at 09:04

## 2025-01-24 RX ADMIN — DEXAMETHASONE SODIUM PHOSPHATE 10 MG: 4 INJECTION INTRA-ARTICULAR; INTRALESIONAL; INTRAMUSCULAR; INTRAVENOUS; SOFT TISSUE at 18:05

## 2025-01-24 RX ADMIN — TRAMADOL HYDROCHLORIDE 50 MG: 50 TABLET, COATED ORAL at 08:56

## 2025-01-24 RX ADMIN — ATORVASTATIN CALCIUM 40 MG: 40 TABLET, FILM COATED ORAL at 19:35

## 2025-01-24 RX ADMIN — ENOXAPARIN SODIUM 30 MG: 100 INJECTION SUBCUTANEOUS at 19:35

## 2025-01-24 RX ADMIN — SODIUM CHLORIDE, PRESERVATIVE FREE 10 ML: 5 INJECTION INTRAVENOUS at 08:57

## 2025-01-24 RX ADMIN — INSULIN LISPRO 2 UNITS: 100 INJECTION, SOLUTION INTRAVENOUS; SUBCUTANEOUS at 11:59

## 2025-01-24 RX ADMIN — GABAPENTIN 300 MG: 300 CAPSULE ORAL at 08:56

## 2025-01-24 RX ADMIN — SODIUM CHLORIDE 3000 MG: 9 INJECTION, SOLUTION INTRAVENOUS at 06:07

## 2025-01-24 RX ADMIN — INSULIN LISPRO 2 UNITS: 100 INJECTION, SOLUTION INTRAVENOUS; SUBCUTANEOUS at 19:34

## 2025-01-24 RX ADMIN — SODIUM CHLORIDE 3000 MG: 9 INJECTION, SOLUTION INTRAVENOUS at 18:08

## 2025-01-24 RX ADMIN — ISOSORBIDE MONONITRATE 30 MG: 30 TABLET, EXTENDED RELEASE ORAL at 08:56

## 2025-01-24 RX ADMIN — SODIUM CHLORIDE, PRESERVATIVE FREE 10 ML: 5 INJECTION INTRAVENOUS at 19:35

## 2025-01-24 RX ADMIN — SODIUM CHLORIDE 3000 MG: 9 INJECTION, SOLUTION INTRAVENOUS at 11:53

## 2025-01-24 RX ADMIN — GABAPENTIN 300 MG: 300 CAPSULE ORAL at 15:20

## 2025-01-24 RX ADMIN — GABAPENTIN 300 MG: 300 CAPSULE ORAL at 19:35

## 2025-01-24 RX ADMIN — DEXAMETHASONE SODIUM PHOSPHATE 10 MG: 10 INJECTION INTRAMUSCULAR; INTRAVENOUS at 00:39

## 2025-01-24 RX ADMIN — METOPROLOL TARTRATE 25 MG: 25 TABLET, FILM COATED ORAL at 08:56

## 2025-01-24 RX ADMIN — PANTOPRAZOLE SODIUM 40 MG: 40 TABLET, DELAYED RELEASE ORAL at 08:56

## 2025-01-24 RX ADMIN — ASPIRIN 81 MG: 81 TABLET, COATED ORAL at 08:56

## 2025-01-24 RX ADMIN — OXYCODONE 5 MG: 5 TABLET ORAL at 11:51

## 2025-01-24 RX ADMIN — DEXAMETHASONE SODIUM PHOSPHATE 10 MG: 4 INJECTION INTRA-ARTICULAR; INTRALESIONAL; INTRAMUSCULAR; INTRAVENOUS; SOFT TISSUE at 10:18

## 2025-01-24 RX ADMIN — AMPICILLIN SODIUM AND SULBACTAM SODIUM 3000 MG: 2; 1 INJECTION, POWDER, FOR SOLUTION INTRAMUSCULAR; INTRAVENOUS at 00:42

## 2025-01-24 ASSESSMENT — PAIN SCALES - GENERAL
PAINLEVEL_OUTOF10: 7
PAINLEVEL_OUTOF10: 3
PAINLEVEL_OUTOF10: 3
PAINLEVEL_OUTOF10: 6

## 2025-01-24 ASSESSMENT — PAIN DESCRIPTION - ORIENTATION
ORIENTATION: LEFT
ORIENTATION: LEFT

## 2025-01-24 ASSESSMENT — PAIN DESCRIPTION - DESCRIPTORS
DESCRIPTORS: ACHING;SORE
DESCRIPTORS: ACHING;SORE

## 2025-01-24 ASSESSMENT — PAIN SCALES - WONG BAKER
WONGBAKER_NUMERICALRESPONSE: NO HURT
WONGBAKER_NUMERICALRESPONSE: NO HURT

## 2025-01-24 ASSESSMENT — PAIN DESCRIPTION - LOCATION
LOCATION: FACE
LOCATION: FACE

## 2025-01-24 ASSESSMENT — PAIN - FUNCTIONAL ASSESSMENT: PAIN_FUNCTIONAL_ASSESSMENT: NONE - DENIES PAIN

## 2025-01-24 NOTE — H&P
Hospitalist History & Physical      PCP: Potocki, Joseph A, DO    Date of Service: Pt seen/examined on 1/24/2025 and is admitted to Inpatient with expected LOS greater than two midnights due to medical therapy.    Placed in Observation.    Chief Complaint:  had concerns including Mass (Mass to left cheek, hard to breath with it, DR lockett at  ).    History of Present Illness:    Mr. Eric Zarco, a 74 y.o. year old male  who  has a past medical history of CAD (coronary artery disease), COPD (chronic obstructive pulmonary disease) (HCC), Diabetes mellitus (HCC), Gastric ulcer, Hypertension, and Sleep apnea.     ER COURSE:  Patient is a direct admit from [facility] due to left parotid mass possible infected. He states that he started noticing the swelling 3 weeks ago and called Dr Tatum at  and was given oral augmentin, he states that swelling just got worse and more painful and came to ED. CT scan  Mass lesion in the LEFT PAROTID GLAND, the margins of which are  indistinct. Given the surrounding edema which extends from the left parotid  gland into the subcutaneous fat, apparent enlargement of the mass since  07/31/2023 may be real or artifactual, given the surrounding edema.  Findings  may represent ACUTE LEFT PAROTIDITIS.  Contrast enhanced CT of the neck is  recommended to evaluate for interval change in the size of the mass.  2. Asymmetric ENLARGEMENT OF THE LEFT PALATINE TONSIL. Direct visualization is recommended. He was seen  by ENT who recommended he transfer for continued iv decadron and IV antibiotics       Past Medical History:        Diagnosis Date    CAD (coronary artery disease)     has had 2 cardiac caths   states has small blockages    COPD (chronic obstructive pulmonary disease) (HCC)     Diabetes mellitus (HCC)     Gastric ulcer     surgery in 1983    Hypertension     Sleep apnea     doesnt use his cpap       Past Surgical History:        Procedure Laterality Date    APPENDECTOMY      CATARACT

## 2025-01-24 NOTE — PLAN OF CARE
Problem: Chronic Conditions and Co-morbidities  Goal: Patient's chronic conditions and co-morbidity symptoms are monitored and maintained or improved  1/24/2025 0812 by Radha Ring RN  Outcome: Progressing  1/24/2025 0650 by Sander Lindsay RN  Outcome: Progressing     Problem: Discharge Planning  Goal: Discharge to home or other facility with appropriate resources  1/24/2025 0812 by Radha Ring RN  Outcome: Progressing  1/24/2025 0650 by Sander Lindsay RN  Outcome: Progressing     Problem: Safety - Adult  Goal: Free from fall injury  1/24/2025 0812 by Radha Ring RN  Outcome: Progressing  1/24/2025 0650 by Sander Lindsay RN  Outcome: Progressing

## 2025-01-24 NOTE — ACP (ADVANCE CARE PLANNING)
Advance Care Planning   Healthcare Decision Maker:    Primary Decision Maker: HaroldoEva - [facility] - [phone]    Click here to complete Healthcare Decision Makers including selection of the Healthcare Decision Maker Relationship (ie \"Primary\").

## 2025-01-24 NOTE — PLAN OF CARE
Patient was admitted after midnight please H&P for complete details    Patient with infected Warthin's tumor of parotid gland with history of prior left superficial parotidectomy and right deep lobe parotidectomy.  Patient currently on IV Unasyn and Decadron.  Patient states that his swelling has improved significantly since admission.  Patient was evaluated by ENT, continue warm compresses to left parotid with gentle massage in the posterior to anterior direction.  Okay for regular diet.    Waiting for ID evaluation possible discharge in 24 to 48 hours if no surgical intervention is planned    Car Anderson MD

## 2025-01-24 NOTE — CONSULTS
[facility] CONSULT NOTE    Reason for Consult: Parotis mass  Requested by: Brianda Stewart APRN - CNP      Chief complaint: Left facial swelling    History Obtained From: Patient and EMR      HISTORY OFPRESENT ILLNESS              The patient is a 74 y.o. male with history of DM, CAD, COPD, hypertension, bilateral parotid masses s/p prior left parotidectomy then later right total parotidectomy with parapharygeal tumor removal and facial nerve dissection in 2021, presented on 01/23 with left facial swelling for 2 weeks worsening despite having been on oral amoxicillin-clavulanate. On admission, he was afebrile and hemodynamically stable with no leukocytosis. CT neck soft tissue showed mass lesion in the left parotid gland with indistinct margins which may represent acute left parotiditis, partial opacification of left middle ear cavity. Blood cultures showed no growth to date. He was evaluated by ENT, was noted to have no purulence expressed from parotid duct or Kiera's duct, though to have  infected left Warthin's tumor of parotid gland and was recommended steroids, ampicillin-sulbactam, warm compress to left parotid with gentle massage in posterior to anterior direction, and follow-up with his ENT at . Ampicillin-sulbactam was started on admission. ID service was subsequently consulted for further recommendations.    Past Medical History  Past Medical History:   Diagnosis Date    CAD (coronary artery disease)     has had 2 cardiac caths   states has small blockages    COPD (chronic obstructive pulmonary disease) (HCC)     Diabetes mellitus (HCC)     Gastric ulcer     surgery in 1983    Hypertension     Sleep apnea     doesnt use his cpap       Current Facility-Administered Medications   Medication Dose Route Frequency Provider Last Rate Last Admin    aspirin EC tablet 81 mg  81 mg Oral Daily Brianda Stewart APRN - CNP   81 mg at 01/24/25 0856    atorvastatin (LIPITOR) tablet 40 mg  40 mg Oral Nightly  surgery    [place] XCAPSL CTRC RMVL INSJ IO LENS PROSTH W/O ECP Right 2018    CATARACT EXTRACTION WITH IOL RIGHT EYE performed by Jj PAULINO MD at [facility] OR        Social History  Social History     Socioeconomic History    Marital status:    Tobacco Use    Smoking status: Every Day     Current packs/day: 0.50     Average packs/day: 0.5 packs/day for 50.0 years (25.0 ttl pk-yrs)     Types: Cigarettes    Smokeless tobacco: Never    Tobacco comments:     currently 0.5 ppd, 21   Vaping Use    Vaping status: Never Used   Substance and Sexual Activity    Alcohol use: Not Currently     Comment: no alcohol since  2014    Drug use: Never    Sexual activity: Never       Family Medical History  Family History   Problem Relation Age of Onset    Cancer Mother         leukemia    Diabetes Father     Heart Attack Father 63             Heart Attack Brother 55        MI        Review of Systems:  Constitutional: No fever, no chills  Eyes: No vision changes, no retroorbital pain  ENT: No hearing changes, no ear pain  Respiratory: No cough, no dyspnea  Cardiovascular: No chest pain, no palpitations  Gastrointestinal: No abdominal pain, no diarrhea  Genitourinary: No dysuria, no hematuria  Integumentary: No rash, no itching  Musculoskeletal: No muscle pain, no joint pain  Neurologic: No headache, no numbness in extremities    Physical Examination:  Vitals:    25 0926 25 1115 25 1221 25 1515   BP:  135/68  123/74   Pulse:  79  78   Resp: 20 21 20 15   Temp:  97.2 °F (36.2 °C)  98.2 °F (36.8 °C)   TempSrc:  Temporal  Temporal   SpO2:    97%   Weight:       Height:         Constitutional: Alert, not in distress  Eyes: Sclerae anicteric, no conjunctival erythema  ENT: No buccal lesion, no pharyngeal exudates  Neck: No nuchal rigidity, no cervical adenopathy  Lungs: Clear breath sounds, no crackles, no wheezes  Heart: Regular rate and rhythm, no murmurs  Abdomen: Bowel

## 2025-01-24 NOTE — ED NOTES
[facility] called and stated 2 hour delay in transport. Per Jonatan at [facility], have been working on outsourcing trip but so far have been unsuccessful.

## 2025-01-24 NOTE — CARE COORDINATION
Transition of Care: Met with pt at bedside to discuss transition of care. Pt lives with spouse at [facility] [address]. No venkata, 1st floor apt. No dme. Independent with adl's. PCP Dr. Araujo. Pharmacy  [facility]. Pt planning to return home with no needs. Admitted with acute parotitis. ID consulted for parotid abscess. ENT following. IV Decadron and Unasyn. CM to follow for any dc needs that arise (TF)        MARI George,RN  Case Management  [phone]            Case Management Assessment  Initial Evaluation    Date/Time of Evaluation: 1/24/2025 2:43 PM  Assessment Completed by: DONNY HUDOSN RN    If patient is discharged prior to next notation, then this note serves as note for discharge by case management.    Patient Name: Eric Zarco                   YOB: 1950  Diagnosis: Acute suppurative parotitis [K11.21]  Parotid mass [K11.8]  Parotid abscess [K11.3]                   Date / Time: 1/23/2025 11:13 AM    Patient Admission Status: Inpatient   Readmission Risk (Low < 19, Mod (19-27), High > 27): Readmission Risk Score: 12.3    Current PCP: Potocki, Joseph A, DO  PCP verified by CM? Yes    Chart Reviewed: Yes      History Provided by: Patient  Patient Orientation: Alert and Oriented    Patient Cognition: Alert    Hospitalization in the last 30 days (Readmission):  No    If yes, Readmission Assessment in CM Navigator will be completed.    Advance Directives:      Code Status: Full Code   Patient's Primary Decision Maker is: Legal Next of Kin    Primary Decision Maker: Eva Zarco - Spouse - [phone]    Discharge Planning:    Patient lives with: Spouse/Significant Other Type of Home: Apartment  Primary Care Giver: Self  Patient Support Systems include: Spouse/Significant Other   Current Financial resources:    Current community resources:    Current services prior to admission: None            Current DME:              Type of Home Care services:  None    ADLS  Prior

## 2025-01-25 VITALS
OXYGEN SATURATION: 96 % | RESPIRATION RATE: 16 BRPM | HEART RATE: 65 BPM | SYSTOLIC BLOOD PRESSURE: 155 MMHG | BODY MASS INDEX: 31.85 KG/M2 | DIASTOLIC BLOOD PRESSURE: 73 MMHG | HEIGHT: 73 IN | TEMPERATURE: 97.4 F | WEIGHT: 240.3 LBS

## 2025-01-25 LAB
ANION GAP SERPL CALCULATED.3IONS-SCNC: 12 MMOL/L (ref 7–16)
BASOPHILS # BLD: 0.01 K/UL (ref 0–0.2)
BASOPHILS NFR BLD: 0 % (ref 0–2)
BUN SERPL-MCNC: 20 MG/DL (ref 6–23)
CALCIUM SERPL-MCNC: 8.9 MG/DL (ref 8.6–10.2)
CHLORIDE SERPL-SCNC: 107 MMOL/L (ref 98–107)
CO2 SERPL-SCNC: 21 MMOL/L (ref 22–29)
CREAT SERPL-MCNC: 1.3 MG/DL (ref 0.7–1.2)
EOSINOPHIL # BLD: 0 K/UL (ref 0.05–0.5)
EOSINOPHILS RELATIVE PERCENT: 0 % (ref 0–6)
ERYTHROCYTE [DISTWIDTH] IN BLOOD BY AUTOMATED COUNT: 14 % (ref 11.5–15)
GFR, ESTIMATED: 58 ML/MIN/1.73M2
GLUCOSE BLD-MCNC: 291 MG/DL (ref 74–99)
GLUCOSE SERPL-MCNC: 226 MG/DL (ref 74–99)
HBA1C MFR BLD: 5 % (ref 4–5.6)
HCT VFR BLD AUTO: 33.6 % (ref 37–54)
HGB BLD-MCNC: 10.9 G/DL (ref 12.5–16.5)
IMM GRANULOCYTES # BLD AUTO: 0.08 K/UL (ref 0–0.58)
IMM GRANULOCYTES NFR BLD: 1 % (ref 0–5)
LYMPHOCYTES NFR BLD: 1.06 K/UL (ref 1.5–4)
LYMPHOCYTES RELATIVE PERCENT: 11 % (ref 20–42)
MAGNESIUM SERPL-MCNC: 2 MG/DL (ref 1.6–2.6)
MCH RBC QN AUTO: 26.6 PG (ref 26–35)
MCHC RBC AUTO-ENTMCNC: 32.4 G/DL (ref 32–34.5)
MCV RBC AUTO: 82 FL (ref 80–99.9)
MONOCYTES NFR BLD: 0.34 K/UL (ref 0.1–0.95)
MONOCYTES NFR BLD: 3 % (ref 2–12)
NEUTROPHILS NFR BLD: 85 % (ref 43–80)
NEUTS SEG NFR BLD: 8.39 K/UL (ref 1.8–7.3)
PLATELET # BLD AUTO: 242 K/UL (ref 130–450)
PMV BLD AUTO: 10.3 FL (ref 7–12)
POTASSIUM SERPL-SCNC: 4.9 MMOL/L (ref 3.5–5)
RBC # BLD AUTO: 4.1 M/UL (ref 3.8–5.8)
SODIUM SERPL-SCNC: 140 MMOL/L (ref 132–146)
WBC OTHER # BLD: 9.9 K/UL (ref 4.5–11.5)

## 2025-01-25 PROCEDURE — 6370000000 HC RX 637 (ALT 250 FOR IP): Performed by: NURSE PRACTITIONER

## 2025-01-25 PROCEDURE — 2500000003 HC RX 250 WO HCPCS: Performed by: NURSE PRACTITIONER

## 2025-01-25 PROCEDURE — 85025 COMPLETE CBC W/AUTO DIFF WBC: CPT

## 2025-01-25 PROCEDURE — 6360000002 HC RX W HCPCS: Performed by: NURSE PRACTITIONER

## 2025-01-25 PROCEDURE — 80048 BASIC METABOLIC PNL TOTAL CA: CPT

## 2025-01-25 PROCEDURE — 36415 COLL VENOUS BLD VENIPUNCTURE: CPT

## 2025-01-25 PROCEDURE — 83735 ASSAY OF MAGNESIUM: CPT

## 2025-01-25 PROCEDURE — 6370000000 HC RX 637 (ALT 250 FOR IP): Performed by: STUDENT IN AN ORGANIZED HEALTH CARE EDUCATION/TRAINING PROGRAM

## 2025-01-25 PROCEDURE — 82962 GLUCOSE BLOOD TEST: CPT

## 2025-01-25 PROCEDURE — 83036 HEMOGLOBIN GLYCOSYLATED A1C: CPT

## 2025-01-25 PROCEDURE — 6360000002 HC RX W HCPCS: Performed by: EMERGENCY MEDICINE

## 2025-01-25 PROCEDURE — 2580000003 HC RX 258: Performed by: NURSE PRACTITIONER

## 2025-01-25 RX ADMIN — INSULIN LISPRO 2 UNITS: 100 INJECTION, SOLUTION INTRAVENOUS; SUBCUTANEOUS at 09:53

## 2025-01-25 RX ADMIN — DEXAMETHASONE SODIUM PHOSPHATE 10 MG: 4 INJECTION INTRA-ARTICULAR; INTRALESIONAL; INTRAMUSCULAR; INTRAVENOUS; SOFT TISSUE at 00:35

## 2025-01-25 RX ADMIN — SODIUM CHLORIDE 3000 MG: 9 INJECTION, SOLUTION INTRAVENOUS at 05:44

## 2025-01-25 RX ADMIN — PANTOPRAZOLE SODIUM 40 MG: 40 TABLET, DELAYED RELEASE ORAL at 09:51

## 2025-01-25 RX ADMIN — SODIUM CHLORIDE, PRESERVATIVE FREE 10 ML: 5 INJECTION INTRAVENOUS at 09:53

## 2025-01-25 RX ADMIN — SODIUM CHLORIDE 3000 MG: 9 INJECTION, SOLUTION INTRAVENOUS at 00:33

## 2025-01-25 RX ADMIN — GABAPENTIN 300 MG: 300 CAPSULE ORAL at 09:52

## 2025-01-25 RX ADMIN — METOPROLOL TARTRATE 25 MG: 25 TABLET, FILM COATED ORAL at 09:51

## 2025-01-25 RX ADMIN — ISOSORBIDE MONONITRATE 30 MG: 30 TABLET, EXTENDED RELEASE ORAL at 09:51

## 2025-01-25 RX ADMIN — DEXAMETHASONE SODIUM PHOSPHATE 10 MG: 4 INJECTION INTRA-ARTICULAR; INTRALESIONAL; INTRAMUSCULAR; INTRAVENOUS; SOFT TISSUE at 10:10

## 2025-01-25 RX ADMIN — ENOXAPARIN SODIUM 30 MG: 100 INJECTION SUBCUTANEOUS at 09:52

## 2025-01-25 RX ADMIN — ASPIRIN 81 MG: 81 TABLET, COATED ORAL at 09:51

## 2025-01-25 NOTE — PLAN OF CARE
Problem: Chronic Conditions and Co-morbidities  Goal: Patient's chronic conditions and co-morbidity symptoms are monitored and maintained or improved  1/24/2025 2043 by Sander Lindsay RN  Outcome: Progressing  1/24/2025 0812 by Radha Ring RN  Outcome: Progressing  1/24/2025 0650 by Sander Lindsay RN  Outcome: Progressing     Problem: Discharge Planning  Goal: Discharge to home or other facility with appropriate resources  1/24/2025 2043 by Sander Lindsay RN  Outcome: Progressing  1/24/2025 0812 by Radha Ring RN  Outcome: Progressing  1/24/2025 0650 by Sander Lindsay RN  Outcome: Progressing     Problem: Safety - Adult  Goal: Free from fall injury  1/24/2025 2043 by Sander Lindsay RN  Outcome: Progressing  1/24/2025 0812 by Radha Ring RN  Outcome: Progressing  1/24/2025 0650 by Sander Lindsay RN  Outcome: Progressing

## 2025-01-25 NOTE — DISCHARGE SUMMARY
[facility] Hospitalist Discharge Summary         Eric Zarco  MRN: [id number]  Account Number: [id number]  Admitted: 1/23/2025  Discharge Date: 01/25/25    PCP Handoff    Recommended Outpatient Testing  None    Results Pending At Discharge  None        Clinical Summary    74-year-old male with CAD, COPD, diabetes mellitus, hypertension, sleep apnea, previous left superficial parotidectomy and right deep lobe parotidectomy presented to the hospital due to concern of infected Warthin's tumor complaining of pain In the mass on the left cheek along with difficulty breathing.  Patient had CT neck with large left parotid mass with surrounding edema and fat stranding.  Patient was started on Unasyn and Decadron.  Patient swelling has significantly improved since in the hospital.  Patient was evaluated by ENT and advised on follow-up with his physician at  after discharge.  Patient can discharge on oral Augmentin.    Infected Warthin's tumor  He was seen by ENT and ID  Will be discharged on oral Augmentin  Outpatient follow-up with Dr. Thor Gordon is recommended  Continue warm compress to left carotid.    Discharge Medications     Medication List        START taking these medications      amoxicillin-clavulanate 1000-62.5 MG per extended release tablet  Commonly known as: AUGMENTIN XR  Take 2 tablets by mouth 2 times daily for 7 days            CONTINUE taking these medications      aspirin 81 MG tablet     atorvastatin 40 MG tablet  Commonly known as: LIPITOR  Take 1 tablet by mouth nightly     fluticasone 50 MCG/ACT nasal spray  Commonly known as: FLONASE     gabapentin 300 MG capsule  Commonly known as: NEURONTIN     glipiZIDE 5 MG tablet  Commonly known as: GLUCOTROL  Take 1 tablet by mouth 2 times daily (before meals).     ibuprofen 600 MG tablet  Commonly known as: ADVIL;MOTRIN     isosorbide mononitrate 30 MG extended release tablet  Commonly known as: IMDUR     metFORMIN 1000 MG tablet  Commonly known

## 2025-01-25 NOTE — CARE COORDINATION
CM Update: Discharge order noted. Plan is home with no needs. Family to provide transport (TF)          OMER GeorgeN,RN  Case Management  [phone]

## 2025-01-26 LAB
MICROORGANISM SPEC CULT: NORMAL
MICROORGANISM SPEC CULT: NORMAL
SERVICE CMNT-IMP: NORMAL
SERVICE CMNT-IMP: NORMAL
SPECIMEN DESCRIPTION: NORMAL
SPECIMEN DESCRIPTION: NORMAL

## 2025-02-03 NOTE — PROGRESS NOTES
4 Eyes Skin Assessment     NAME:  Eric Zarco  YOB: 1950  MEDICAL RECORD NUMBER:  [id number]    The patient is being assessed for  Admission    I agree that at least one RN has performed a thorough Head to Toe Skin Assessment on the patient. ALL assessment sites listed below have been assessed.      Areas assessed by both nurses:    Head, Face, Ears, Shoulders, Back, Chest, Arms, Elbows, Hands, Sacrum. Buttock, Coccyx, Ischium, and Legs. Feet and Heels        Does the Patient have a Wound? No noted wound(s)       Audie Prevention initiated by RN: No  Wound Care Orders initiated by RN: No    Pressure Injury (Stage 3,4, Unstageable, DTI, NWPT, and Complex wounds) if present, place Wound referral order by RN under : No    New Ostomies, if present place, Ostomy referral order under : No     Nurse 1 eSignature: Electronically signed by Sander Lindsay RN on 1/24/25 at 6:40 AM EST    **SHARE this note so that the co-signing nurse can place an eSignature**    Nurse 2 eSignature: Electronically signed by Violette Whiting RN on 1/24/25 at 6:42 AM EST    
CLINICAL PHARMACY NOTE: MEDS TO BEDS    Total # of Prescriptions Filled: 1   The following medications were delivered to the patient:  Amoxicillin-pot clav 1000-62.5    Additional Documentation:     MAGDALENA jacobs picked up in the pharmacy  
Chart accessed due to pending admission to unit.    Electronically signed by Sander Lindsay RN on 1/24/25 at 2:52 AM EST    
ID consulted. Electronically signed by Lianet Wall RN on 1/24/2025 at 3:14 PM    
OTOLARYNGOLOGY  DAILY PROGRESS NOTE  2025    Subjective:     Patient transferred to [facility] overnight.  Reports subjective improvement in his throat pain and trouble swallowing as well as facial swelling.     Objective:     BP (!) 180/127   Pulse 85   Temp 97.8 °F (36.6 °C) (Temporal)   Resp 20   Ht 1.854 m (6' 1\")   Wt 109 kg (240 lb 4.8 oz)   SpO2 97%   BMI 31.70 kg/m²   PULSE OXIMETRY RANGE: SpO2  Av %  Min: 94 %  Max: 99 %  No intake/output data recorded.    GENERAL:  Awake, alert, cooperative, no apparent distress  HENT: Normocephalic, atraumatic.  Bilateral external ears WNL, Nares with obstructive nasal polyposis bilaterally at the level of inferior turbinate, Septum midline, tongue soft, uvula midline with pedunculated papilloma, tolerating oral secretion, no purulence expressed from parotid duct or Kiera's ducts. indurated left parotid gland with palpable mass with mild fluctuance and TTP which is moderately improved today.  Hb 1/6 bilaterally  EYES: No sclera icterus, pupils equal  LUNGS:  No increased work of breathing, no stridor  CARDIOVASCULAR:  RR      Assessment/Plan:     74 y.o. 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     Unasyn for now  ID consult pending  Decadron 10 mg q8 hours for 3 doses   Ok for regular diet, encourage oral hydration  Warm compresses to left parotid and gentle massage in posterior to anterior direction  Patient is to follow up with Dr. Mcrae as scheduled upon discharge  Contact on-call ENT resident with any further questions/concerns    Patient seen and examined by resident, discussed with attending on call.    Electronically signed by Ashok Winkler DO on 2025 at 7:10 AM     
OTOLARYNGOLOGY  DAILY PROGRESS NOTE  2025    Subjective:     Reports complete resolution of his throat pain and trouble swallowing as well as facial pain.     Objective:     BP (!) 155/73   Pulse 65   Temp 97.4 °F (36.3 °C) (Temporal)   Resp 16   Ht 1.854 m (6' 1\")   Wt 109 kg (240 lb 4.8 oz)   SpO2 96%   BMI 31.70 kg/m²   PULSE OXIMETRY RANGE: SpO2  Av.6 %  Min: 95 %  Max: 98 %  I/O last 3 completed shifts:  In: 1048.1 [I.V.:597.3; IV Piggyback:450.9]  Out: -     GENERAL:  Awake, alert, cooperative, no apparent distress  HENT: Normocephalic, atraumatic.  Bilateral external ears WNL, Nares with obstructive nasal polyposis bilaterally at the level of inferior turbinate, Septum midline, tongue soft, uvula midline with pedunculated papilloma, tolerating oral secretion, no purulence expressed from parotid duct or Chickasaw's ducts. indurated left parotid gland with palpable mass with mild fluctuance and TTP which is improved today.  Hb 1/6 bilaterally  EYES: No sclera icterus, pupils equal  LUNGS:  No increased work of breathing, no stridor  CARDIOVASCULAR:  RR      Assessment/Plan:     74 y.o. 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     Change ampicillin-sulbactam to oral Augmentin XR 2 tab BID for 7 days on discharge per ID recs  Decadron 10 mg q8 hours for 3 doses   Ok for regular diet, encourage oral hydration  Warm compresses to left parotid and gentle massage in posterior to anterior direction  Patient is to follow up with Dr. Mcrae as scheduled upon discharge  Ok for DC from ENT standpoint  Contact on-call ENT resident with any further questions/concerns    Patient seen and examined by resident, discussed with attending on call.    Electronically signed by Burton Rutherford DO on 2025 at 10:37 AM     
Patient left unit with wife and son prior to receiving AVS. Electronically signed by Lianet Wall RN on 1/25/2025 at 12:07 PM    
Perfect serve message sent to Brianda Stewart NP, regarding pt arrival to floor, home med list completion, and pt BP of 180/127 and unable to tolerate PO medications at this time.    Electronically signed by Sander Lindsay RN on 1/24/25 at 5:24 AM EST    
Spiritual Health History and Assessment/Progress Note  [facility]    Spiritual/Emotional Needs,  ,  ,      Name: Eric Zarco MRN: [id number]    Age: 74 y.o.     Sex: male   Language: English   Hindu: None   Acute suppurative parotitis     Date: 1/24/2025                           Spiritual Assessment began in [facility]        Referral/Consult From: Rounding   Encounter Overview/Reason: Spiritual/Emotional Needs  Service Provided For: Patient    Brittany, Belief, Meaning:   Patient identifies as spiritual  Family/Friends No family/friends present      Importance and Influence:  Patient has spiritual/personal beliefs that influence decisions regarding their health  Family/Friends No family/friends present    Community:  Patient feels well-supported. Support system includes: Spouse/Partner  Family/Friends No family/friends present    Assessment and Plan of Care:     Patient Interventions include: Facilitated expression of thoughts and feelings and Explored spiritual coping/struggle/distress  Family/Friends Interventions include: No family/friends present    Patient Plan of Care: Spiritual Care available upon further referral  Family/Friends Plan of Care: No family/friends present    Electronically signed by DE STEVENS on 1/24/2025 at 12:51 PM   
Laurie Blair on 1/28/2025 9:56 AM      Electronically signed by:  Car Anderson MD 2/3/2025 1:11 PM

## 2025-02-25 ENCOUNTER — APPOINTMENT (OUTPATIENT)
Dept: OTOLARYNGOLOGY | Facility: CLINIC | Age: 75
End: 2025-02-25
Payer: MEDICARE

## 2025-02-25 VITALS — WEIGHT: 238 LBS | HEIGHT: 73 IN | BODY MASS INDEX: 31.54 KG/M2

## 2025-02-25 DIAGNOSIS — K11.8 MASS OF LEFT PAROTID GLAND: ICD-10-CM

## 2025-02-25 DIAGNOSIS — R60.9 PAROTID SWELLING: Primary | ICD-10-CM

## 2025-02-25 PROCEDURE — 1159F MED LIST DOCD IN RCRD: CPT | Performed by: OTOLARYNGOLOGY

## 2025-02-25 PROCEDURE — 99215 OFFICE O/P EST HI 40 MIN: CPT | Performed by: OTOLARYNGOLOGY

## 2025-02-25 RX ORDER — AMOXICILLIN AND CLAVULANATE POTASSIUM 875; 125 MG/1; MG/1
1 TABLET, FILM COATED ORAL 2 TIMES DAILY
Qty: 28 TABLET | Refills: 2 | Status: SHIPPED | OUTPATIENT
Start: 2025-02-25 | End: 2025-04-08

## 2025-02-25 ASSESSMENT — PATIENT HEALTH QUESTIONNAIRE - PHQ9
SUM OF ALL RESPONSES TO PHQ9 QUESTIONS 1 AND 2: 0
1. LITTLE INTEREST OR PLEASURE IN DOING THINGS: NOT AT ALL
2. FEELING DOWN, DEPRESSED OR HOPELESS: NOT AT ALL

## 2025-02-25 NOTE — PROGRESS NOTES
CC: Left parotid gland swelling     Consulted by: hCristal Garrett DO    HPI:    Known bilateral parotid gland tumors for many years.   Previous left superficial parotid lobe  warthin's tumor removed by outside ENT  S/P removal large deep lobe right parotid warthin's tumor by Dr. Jang in 2021. Hospital course complicated by COPD. Required 5 day admission     Here for follow up of left parotid mass which has been observed. Unfortunately the patient had another hospitalization due to sialadenitis on the left side for steroids and antibiotics. He has improved since then and the area is mostly pain free. He would like to discuss removal of the parotid today. Has not yet gotten his MRI done.    Social history: Current smoker.  0.5-1 pack/day.  Family history:  Reviewed and not relevant to the presenting complaint  Current medications:      Reviewed as noted in current orders   Allergies:                               Reviewed and as noted in current orders  ROS:  All other systems have been reviewed and are negative for complaint. I personally reviewed the intake form that was scanned in today    PE:  CONSTITUTIONAL:  Vitals  -reviewed from intake field, well developed, well nourished.    VOICE:    RESPIRATION:  Breathing comfortably, no stridor.  CV:  No clubbing/cyanosis/edema in hands.  EYES:  EOM Intact, sclera normal.  NEURO:  Alert and oriented times 3, Cranial nerves II-XII intact and symmetric bilaterally.  HEAD AND FACE: Large 5 cm mass in the tail of left parotid.  Tender to palpation with surrounding erythema.  No facial weakness.  No pus extruded from Stensen's duct.  EARS:  Normal external ears, external auditory canals, and TMs to otoscopy, normal hearing to whispered voice.  NOSE:  External nose midline, anterior rhinoscopy is normal with limited visualization to the anterior aspect of the inferior turbinates.  No lesions noted.    ORAL CAVITY/OROPHARYNX/LIPS:  Normal mucous membranes, normal floor of  mouth/tongue/OP, no masses or lesions are noted.  PHARYNGEAL WALLS AND NASOPHARYNX:  No masses noted. Mucosa appears clean and moist  NECK/LYMPH:  No LAD, no thyroid masses. Trachea palpably midline  SKIN: Evidence of previous bilateral parotidectomy.  PSYCH:  Alert and oriented with appropriate mood and affect    A/P:     Left sided known Warthins tumor with multiple episodes of sialadenitis requiring admission for IV abx. Will pursue surgical management with left parotidectomy. We discussed the risks of surgery, specifically the risk to the facial nerve in his case as he has had procedures on this area before and multiple infections.     -will schedule for surgery at [facility]  -PAT needed, will coordinate scheduling with the MRI      The above resident note has been reviewed and confirmed.  Patient was seen and examined with the resident today.  Documentation has been reviewed.          Lengthy and detailed discussion was held with patient regarding parotidectomy including risk benefits and alternatives  including risks to the facial nerve was discussed. This includes partial or complete, temporary or permanent facial paralysis and the implications of each. They understand surgical incisions, numbness of the region, disfigurement, sialocele, salivary fistula, Lily's syndrome and perioperative risks given any associated medical comorbidities. Length of surgery, expected outcomes have all reviewed in detail option reviewed.  Consent was obtained  Patient and family understand the increased risk given his medical comorbidities as well as the previous surgery in the region and increased risk of permanent facial paralysis, given the multiple infections multiple trips to the operating room and the symptomatic state and a recurrent nature patient is willing to undergo these risks and assume them surgical be scheduled in the near future.  Will also admit him and consult our medicine colleagues to assist with  postoperative care

## 2025-03-14 ENCOUNTER — HOSPITAL ENCOUNTER (OUTPATIENT)
Dept: RADIOLOGY | Facility: HOSPITAL | Age: 75
Discharge: HOME | End: 2025-03-14
Payer: MEDICARE

## 2025-03-14 ENCOUNTER — APPOINTMENT (OUTPATIENT)
Dept: OTOLARYNGOLOGY | Facility: CLINIC | Age: 75
End: 2025-03-14
Payer: MEDICARE

## 2025-03-14 ENCOUNTER — APPOINTMENT (OUTPATIENT)
Dept: RADIOLOGY | Facility: CLINIC | Age: 75
End: 2025-03-14
Payer: MEDICARE

## 2025-03-14 DIAGNOSIS — K11.8 MASS OF LEFT PAROTID GLAND: ICD-10-CM

## 2025-03-14 PROCEDURE — A9575 INJ GADOTERATE MEGLUMI 0.1ML: HCPCS | Performed by: OTOLARYNGOLOGY

## 2025-03-14 PROCEDURE — 2550000001 HC RX 255 CONTRASTS: Performed by: OTOLARYNGOLOGY

## 2025-03-14 PROCEDURE — 70543 MRI ORBT/FAC/NCK W/O &W/DYE: CPT

## 2025-03-14 RX ORDER — GADOTERATE MEGLUMINE 376.9 MG/ML
20 INJECTION INTRAVENOUS
Status: COMPLETED | OUTPATIENT
Start: 2025-03-14 | End: 2025-03-14

## 2025-03-14 RX ADMIN — GADOTERATE MEGLUMINE 20 ML: 376.9 INJECTION INTRAVENOUS at 18:53

## 2025-04-02 ENCOUNTER — APPOINTMENT (OUTPATIENT)
Dept: PREADMISSION TESTING | Facility: HOSPITAL | Age: 75
End: 2025-04-02
Payer: MEDICARE

## 2025-04-09 ENCOUNTER — PRE-ADMISSION TESTING (OUTPATIENT)
Dept: PREADMISSION TESTING | Facility: HOSPITAL | Age: 75
End: 2025-04-09
Payer: MEDICARE

## 2025-04-09 VITALS
WEIGHT: 247.4 LBS | DIASTOLIC BLOOD PRESSURE: 98 MMHG | TEMPERATURE: 97.7 F | BODY MASS INDEX: 32.79 KG/M2 | HEART RATE: 60 BPM | OXYGEN SATURATION: 98 % | HEIGHT: 73 IN | SYSTOLIC BLOOD PRESSURE: 173 MMHG

## 2025-04-09 DIAGNOSIS — I10 HYPERTENSION, UNSPECIFIED TYPE: ICD-10-CM

## 2025-04-09 DIAGNOSIS — Z01.818 PREOPERATIVE TESTING: Primary | ICD-10-CM

## 2025-04-09 DIAGNOSIS — K11.8 MASS OF LEFT PAROTID GLAND: ICD-10-CM

## 2025-04-09 LAB
ABO GROUP (TYPE) IN BLOOD: NORMAL
ANION GAP SERPL CALC-SCNC: 16 MMOL/L (ref 10–20)
ANTIBODY SCREEN: NORMAL
BUN SERPL-MCNC: 14 MG/DL (ref 6–23)
CALCIUM SERPL-MCNC: 9.5 MG/DL (ref 8.6–10.6)
CHLORIDE SERPL-SCNC: 104 MMOL/L (ref 98–107)
CO2 SERPL-SCNC: 22 MMOL/L (ref 21–32)
CREAT SERPL-MCNC: 1.41 MG/DL (ref 0.5–1.3)
EGFRCR SERPLBLD CKD-EPI 2021: 52 ML/MIN/1.73M*2
ERYTHROCYTE [DISTWIDTH] IN BLOOD BY AUTOMATED COUNT: 14.4 % (ref 11.5–14.5)
GLUCOSE SERPL-MCNC: 67 MG/DL (ref 74–99)
HCT VFR BLD AUTO: 35.5 % (ref 41–52)
HGB BLD-MCNC: 11.5 G/DL (ref 13.5–17.5)
MCH RBC QN AUTO: 26.4 PG (ref 26–34)
MCHC RBC AUTO-ENTMCNC: 32.4 G/DL (ref 32–36)
MCV RBC AUTO: 82 FL (ref 80–100)
NRBC BLD-RTO: 0 /100 WBCS (ref 0–0)
PLATELET # BLD AUTO: 234 X10*3/UL (ref 150–450)
POTASSIUM SERPL-SCNC: 4.9 MMOL/L (ref 3.5–5.3)
RBC # BLD AUTO: 4.35 X10*6/UL (ref 4.5–5.9)
RH FACTOR (ANTIGEN D): NORMAL
SODIUM SERPL-SCNC: 137 MMOL/L (ref 136–145)
WBC # BLD AUTO: 6.5 X10*3/UL (ref 4.4–11.3)

## 2025-04-09 PROCEDURE — 85027 COMPLETE CBC AUTOMATED: CPT

## 2025-04-09 PROCEDURE — 99406 BEHAV CHNG SMOKING 3-10 MIN: CPT | Performed by: NURSE PRACTITIONER

## 2025-04-09 PROCEDURE — 86900 BLOOD TYPING SEROLOGIC ABO: CPT

## 2025-04-09 PROCEDURE — 36415 COLL VENOUS BLD VENIPUNCTURE: CPT

## 2025-04-09 PROCEDURE — 80048 BASIC METABOLIC PNL TOTAL CA: CPT

## 2025-04-09 PROCEDURE — 99205 OFFICE O/P NEW HI 60 MIN: CPT | Performed by: NURSE PRACTITIONER

## 2025-04-09 PROCEDURE — 93005 ELECTROCARDIOGRAM TRACING: CPT

## 2025-04-09 RX ORDER — ISOSORBIDE MONONITRATE 30 MG/1
1 TABLET, EXTENDED RELEASE ORAL
COMMUNITY
Start: 2025-04-04

## 2025-04-09 ASSESSMENT — DUKE ACTIVITY SCORE INDEX (DASI)
CAN YOU WALK INDOORS, SUCH AS AROUND YOUR HOUSE: YES
CAN YOU HAVE SEXUAL RELATIONS: YES
CAN YOU PARTICIPATE IN MODERATE RECREATIONAL ACTIVITIES LIKE GOLF, BOWLING, DANCING, DOUBLES TENNIS OR THROWING A BASEBALL OR FOOTBALL: NO
CAN YOU DO MODERATE WORK AROUND THE HOUSE LIKE VACUUMING, SWEEPING FLOORS OR CARRYING GROCERIES: YES
CAN YOU DO LIGHT WORK AROUND THE HOUSE LIKE DUSTING OR WASHING DISHES: YES
CAN YOU DO HEAVY WORK AROUND THE HOUSE LIKE SCRUBBING FLOORS OR LIFTING AND MOVING HEAVY FURNITURE: YES
CAN YOU RUN A SHORT DISTANCE: NO
CAN YOU DO YARD WORK LIKE RAKING LEAVES, WEEDING OR PUSHING A MOWER: YES
DASI METS SCORE: 7.3
CAN YOU TAKE CARE OF YOURSELF (EAT, DRESS, BATHE, OR USE TOILET): YES
CAN YOU CLIMB A FLIGHT OF STAIRS OR WALK UP A HILL: YES
CAN YOU WALK A BLOCK OR TWO ON LEVEL GROUND: YES
CAN YOU PARTICIPATE IN STRENOUS SPORTS LIKE SWIMMING, SINGLES TENNIS, FOOTBALL, BASKETBALL, OR SKIING: NO
TOTAL_SCORE: 36.7

## 2025-04-09 ASSESSMENT — ENCOUNTER SYMPTOMS
MUSCULOSKELETAL NEGATIVE: 1
EYES NEGATIVE: 1
CONSTITUTIONAL NEGATIVE: 1
GASTROINTESTINAL NEGATIVE: 1
DYSPNEA WITH EXERTION: 1
RESPIRATORY NEGATIVE: 1
ENDOCRINE NEGATIVE: 1
NEUROLOGICAL NEGATIVE: 1
CARDIOVASCULAR NEGATIVE: 1
NECK NEGATIVE: 1

## 2025-04-09 ASSESSMENT — LIFESTYLE VARIABLES: SMOKING_STATUS: SMOKER

## 2025-04-09 NOTE — NURSING NOTE
Pt seen in PAT. Orders reviewed with provider. Labs collected by this RN included CBC, T/S, BMP. EKG completed. Pt discharged with AVS, no further questions.

## 2025-04-09 NOTE — H&P (VIEW-ONLY)
CPM/PAT Evaluation       Name: Jeff Tapia (Jeff Tapia)  /Age: 1950/74 y.o.     Visit Type:   In-Person       Chief Complaint: perioperative evaluation      HPI  The patient is a 74 year old male with history of bilateral parotid gland tumors. 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 Jang in  with hospital course complicated by COPD requiring 5 day admission. He now has left parotid mass, in the setting of multiple hospitalizations for sialadenitis requiring steroids and antibiotics. He is referred today by Dr. Patrick Jang for perioperative evaluation in anticipation of EXCISION, PAROTID GLAND - Left 25.     Past Medical History:   Diagnosis Date    Delayed emergence from general anesthesia     GERD (gastroesophageal reflux disease)     Hypertension     Peptic ulcer disease     Type 2 diabetes mellitus        History reviewed. No pertinent surgical history.    Patient  has no history on file for sexual activity.    Family History   Problem Relation Name Age of Onset    Leukemia Mother      Heart attack Father      Diabetes Father      Heart attack Brother      Diabetes Brother         No Known Allergies    Prior to Admission medications    Medication Sig Start Date End Date Taking? Authorizing Provider   amoxicillin-pot clavulanate (Augmentin) 875-125 mg tablet Take 1 tablet by mouth 2 times a day. 25  Patrick Jang MD   aspirin 81 mg EC tablet Take 1 tablet (81 mg) by mouth once daily.    Historical Provider, MD   gabapentin (Neurontin) 300 mg capsule Take 1 capsule (300 mg) by mouth 3 times a day for 14 days. 25  Patrick Jang MD   glipiZIDE (Glucotrol) 5 mg tablet Take 1 tablet (5 mg) by mouth 2 times a day.    Historical Provider, MD   metFORMIN (Glucophage) 1,000 mg tablet Take 1 tablet (1,000 mg) by mouth 2 times a day.    Historical Provider, MD   metoprolol tartrate (Lopressor) 25 mg tablet Take 1 tablet (25 mg) by mouth once daily.     Patient is ready for discharge. Patient stable alert and oriented. IVs removed. No complaints of pain. Discussed discharge plan. Reviewed medications and side effects, appointments, and answered questions with patient and family. Medications filled at [facility]. Discharge paperwork reviewed with patient and given to patient to take home     "Historical Provider, MD   omega 3-dha-epa-fish oil 100-400-1,000 mg capsule Take 1,000 mg by mouth once daily.    Historical Provider, MD   pantoprazole (ProtoNix) 40 mg EC tablet Take 1 tablet (40 mg) by mouth once daily.    Historical Provider, MD COVARRUBIAS ROS:   Constitutional:   neg    Neuro/Psych:   neg    Eyes:   neg    Ears:   neg    Nose:   neg    Mouth:   neg    Throat:   neg    Neck:   neg    Cardio:   neg     YEE (chronic, unchanged)  Respiratory:   neg    Endocrine:   neg    GI:   neg    :   neg    Musculoskeletal:   neg    Hematologic:   neg    Skin:  neg        Physical Exam  Vitals reviewed.   Constitutional:       Appearance: Normal appearance.   HENT:      Head: Normocephalic and atraumatic.      Nose: Nose normal.      Mouth/Throat:      Mouth: Mucous membranes are moist.      Pharynx: Oropharynx is clear.   Eyes:      Extraocular Movements: Extraocular movements intact.      Pupils: Pupils are equal, round, and reactive to light.   Cardiovascular:      Rate and Rhythm: Normal rate and regular rhythm.      Pulses: Normal pulses.      Heart sounds: Normal heart sounds.   Pulmonary:      Effort: Pulmonary effort is normal.      Breath sounds: Normal breath sounds.   Musculoskeletal:         General: Normal range of motion.      Cervical back: Normal range of motion.   Skin:     General: Skin is warm and dry.   Neurological:      General: No focal deficit present.      Mental Status: He is alert and oriented to person, place, and time.   Psychiatric:         Mood and Affect: Mood normal.         Behavior: Behavior normal.          PAT AIRWAY:   Airway:     Mallampati::  IV    TM distance::  >3 FB    Neck ROM::  Full  normal          Visit Vitals  BP (!) 173/98   Pulse 60   Temp 36.5 °C (97.7 °F)   Ht 1.854 m (6' 1\")   Wt 112 kg (247 lb 6.4 oz)   SpO2 98%   BMI 32.64 kg/m²   Smoking Status Every Day   BSA 2.4 m²       DASI Risk Score      Flowsheet Row Pre-Admission Testing from 4/9/2025 in  " [facility]   Can you take care of yourself (eat, dress, bathe, or use toilet)?  2.75 filed at 04/09/2025 1025   Can you walk indoors, such as around your house? 1.75 filed at 04/09/2025 1025   Can you walk a block or two on level ground?  2.75 filed at 04/09/2025 1025   Can you climb a flight of stairs or walk up a hill? 5.5 filed at 04/09/2025 1025   Can you run a short distance? 0 filed at 04/09/2025 1025   Can you do light work around the house like dusting or washing dishes? 2.7 filed at 04/09/2025 1025   Can you do moderate work around the house like vacuuming, sweeping floors or carrying groceries? 3.5 filed at 04/09/2025 1025   Can you do heavy work around the house like scrubbing floors or lifting and moving heavy furniture?  8 filed at 04/09/2025 1025   Can you do yard work like raking leaves, weeding or pushing a mower? 4.5 filed at 04/09/2025 1025   Can you have sexual relations? 5.25 filed at 04/09/2025 1025   Can you participate in moderate recreational activities like golf, bowling, dancing, doubles tennis or throwing a baseball or football? 0 filed at 04/09/2025 1025   Can you participate in strenous sports like swimming, singles tennis, football, basketball, or skiing? 0 filed at 04/09/2025 1025   DASI SCORE 36.7 filed at 04/09/2025 1025   METS Score (Will be calculated only when all the questions are answered) 7.3 filed at 04/09/2025 1025          [place] DVT Assessment      Flowsheet Row Pre-Admission Testing from 4/9/2025 in [facility]   DVT Score (IF A SCORE IS NOT CALCULATING, MUST SELECT A BMI TO COMPLETE) 11 filed at 04/09/2025 1110   Medical Factors COPD filed at 04/09/2025 1110   Surgical Factors Major surgery planned, lasting over 3 hours filed at 04/09/2025 1110   BMI (BMI MUST BE CHOSEN) 31-40 (Obesity) filed at 04/09/2025 1110          Modified Frailty Index      Flowsheet Row Pre-Admission Testing from 4/9/2025 in [facility]    Non-independent functional status (problems with dressing, bathing, personal grooming, or cooking) 0 filed at 04/09/2025 1110   History of diabetes mellitus  0.0909 filed at 04/09/2025 1110   History of COPD 0.0909 filed at 04/09/2025 1110   History of CHF No filed at 04/09/2025 1110   History of MI 0 filed at 04/09/2025 1110   History of Percutaneous Coronary Intervention, Cardiac Surgery, or Angina No filed at 04/09/2025 1110   Hypertension requiring the use of medication  0.0909 filed at 04/09/2025 1110   Peripheral vascular disease 0 filed at 04/09/2025 1110   Impaired sensorium (cognitive impairement or loss, clouding, or delirium) 0 filed at 04/09/2025 1110   TIA or CVA withouy residual deficit 0 filed at 04/09/2025 1110   Cerebrovascular accident with deficit 0 filed at 04/09/2025 1110   Modified Frailty Index Calculator .2727 filed at 04/09/2025 1110          [id number] Stroke Risk Points         N/A 0 to 9 Points:      Last Change: N/A          The [id number] risk score (Lip GH, et al. 2009. © 2010 [employer]) quantifies the risk of stroke for a patient with atrial fibrillation. For patients without atrial fibrillation or under the age of 18 this score appears as N/A. Higher score values generally indicate higher risk of stroke.        This score is not applicable to this patient. Components are not calculated.          Revised Cardiac Risk Index      Flowsheet Row Pre-Admission Testing from 4/9/2025 in [facility]   High-Risk Surgery (Intraperitoneal, Intrathoracic,Suprainguinal vascular) 0 filed at 04/09/2025 1109   History of ischemic heart disease (History of MI, History of positive exercuse test, Current chest paint considered due to myocardial ischemia, Use of nitrate therapy, ECG with pathological Q Waves) 0 filed at 04/09/2025 1109   History of congestive heart failure (pulmonary edemia, bilateral rales or S3 gallop, Paroxysmal nocturnal dyspnea, CXR  showing pulmonary vascular redistribution) 0 filed at 04/09/2025 1109   History of cerebrovascular disease (Prior TIA or stroke) 0 filed at 04/09/2025 1109   Pre-operative insulin treatment 0 filed at 04/09/2025 1109   Pre-operative creatinine>2 mg/dl 0 filed at 04/09/2025 1109   Revised Cardiac Risk Calculator 0 filed at 04/09/2025 1109          Apfel Simplified Score      Flowsheet Row Pre-Admission Testing from 4/9/2025 in [facility]   Smoking status 0 filed at 04/09/2025 1110   History of motion sickness or PONV  0 filed at 04/09/2025 1110   Use of postoperative opioids 1 filed at 04/09/2025 1110   Gender - Female 0=No filed at 04/09/2025 1110   Apfel Simplified Score Calculator 1 filed at 04/09/2025 1110          Risk Analysis Index Results This Encounter    No data found in the last 10 encounters.       Stop Bang Score      Flowsheet Row Pre-Admission Testing from 4/9/2025 in [facility]   Do you snore loudly? 1 filed at 04/09/2025 1025   Do you often feel tired or fatigued after your sleep? 0 filed at 04/09/2025 1025   Has anyone ever observed you stop breathing in your sleep? 1 filed at 04/09/2025 1025   Do you have or are you being treated for high blood pressure? 1 filed at 04/09/2025 1025   Recent BMI (Calculated) 31.4 filed at 04/09/2025 1025   Is BMI greater than 35 kg/m2? 0=No filed at 04/09/2025 1025   Age older than 50 years old? 1=Yes filed at 04/09/2025 1025   Is your neck circumference greater than 17 inches (Male) or 16 inches (Female)? 0 filed at 04/09/2025 1025   Gender - Male 1=Yes filed at 04/09/2025 1025   STOP-BANG Total Score 5 filed at 04/09/2025 1025          Prodigy: High Risk  Total Score: 20              Prodigy Age Score      Prodigy Gender Score          ARISCAT Score for Postoperative Pulmonary Complications    No data to display       [place] Perioperative Risk for Myocardial Infarction or Cardiac Arrest (MICHAEL)    No data to display       Recent  Results (from the past 4 weeks)   CBC    Collection Time: 04/09/25 11:02 AM   Result Value Ref Range    WBC 6.5 4.4 - 11.3 x10*3/uL    nRBC 0.0 0.0 - 0.0 /100 WBCs    RBC 4.35 (L) 4.50 - 5.90 x10*6/uL    Hemoglobin 11.5 (L) 13.5 - 17.5 g/dL    Hematocrit 35.5 (L) 41.0 - 52.0 %    MCV 82 80 - 100 fL    MCH 26.4 26.0 - 34.0 pg    MCHC 32.4 32.0 - 36.0 g/dL    RDW 14.4 11.5 - 14.5 %    Platelets 234 150 - 450 x10*3/uL   Basic Metabolic Panel    Collection Time: 04/09/25 11:02 AM   Result Value Ref Range    Glucose 67 (L) 74 - 99 mg/dL    Sodium 137 136 - 145 mmol/L    Potassium 4.9 3.5 - 5.3 mmol/L    Chloride 104 98 - 107 mmol/L    Bicarbonate 22 21 - 32 mmol/L    Anion Gap 16 10 - 20 mmol/L    Urea Nitrogen 14 6 - 23 mg/dL    Creatinine 1.41 (H) 0.50 - 1.30 mg/dL    eGFR 52 (L) >60 mL/min/1.73m*2    Calcium 9.5 8.6 - 10.6 mg/dL   Type And Screen Is this order related to pregnancy or an upcoming surgery? Yes; Where will this surgery/delivery be performed? [facility]; What is the date of the surgery? 4/16/2025; Has this patient ever had a transfusion? Unknown; ...    Collection Time: 04/09/25 11:02 AM   Result Value Ref Range    ABO TYPE O     Rh TYPE POS     ANTIBODY SCREEN NEG         Diagnostic Results    EKG 4/9/25    EKG 1/4/21  Normal sinus rhythm  Incomplete right bundle branch block  Borderline ECG  No previous ECGs available    Assessment and Plan:     Anesthesia  The patient notes anesthesia complications in the past related to delayed emergence.     Neurology  The patient has no neurological diagnoses or significant findings on chart review, clinical presentation, and evaluation.     No grossly apparent neurological perioperative risk.     The patient is at increased risk for postoperative delirium secondary to age 65 or older. The patient is at increased risk for perioperative stroke secondary to hypertension , increased age, diabetes mellitus, general anesthesia, operative time >2.5 hours.  Handouts for preoperative brain exercises given to patient.    HEENT/Airway  The patient has history of warthin's tumor s/p excision, left parotid gland tumor,multiple episodes of sialadenitis requiring admission for IV abx. scheduled for surgery with Dr. Jang on 2/25/25. No documented or reported history of airway difficulty.     Cardiovascular  The patient has hypertension managed on imdur, metoprolol (instructed to continue as prescribed in the perioperative period). BP elevated today 173/98. Patient's daughter reports home /80.    METS  The patient's functional capacity is greater than 4 METS.  RCRI  The patient meets 0 RCRI criteria and therefor has a 3.9% risk of major adverse cardiac complications.  MICHAEL score which indicates a 0.7% risk of intraoperative or 30-day postoperative MACE (major adverse cardiac event).    Cardiology Evaluation  The patient is not followed by cardiology.    He is scheduled for non-cardiac surgery associated with elevated risk. The patient has no major cardiac contraindications to non- cardiac surgery.    Pulmonary  The patient has COPD noncompliant with inhalers. Denies any recent URIs, exacerbations or hospitalizations. Smoking cessation discussed, patient not ready to quit.    The patient is at increased risk of perioperative pulmonary complications secondary to neck surgery, chronic obstructive lung disease, advanced age greater than 60.     STOP BANG 5, which places patient at high risk for having LYNNETET.    Recommend prioritizing non opioid analgesic techniques (regional and local anesthesia, nonsteroidal medications, etc) before the administration of opioids and close monitoring for hypoventilation after surgery due to LYNNETTE/supsected LYNNETTE. If intravenous narcotics are needed beyond the immediate twin-operative period, the patient may benefit from continuous pulse oximetry to monitor for hypoxic events till baseline Sp02 is normal on room air and  a respiratory therapy  evaluation.    ARISCAT 26, Intermediate, 13.3% risk of in-hospital postoperative pulmonary complications  PRODIGY 25, high risk of respiratory depression episode.     Patient given PI sheet for preoperative deep breathing exercises.  Encourage  incentive spirometry in the postoperative period as deemed necessary.    Endocrine  The patient has diabetes mellitus managed on metformin, glipizide (hold morning of surgery),managed by PCP. A1C 5.0 on 1/25/25.    Gastrointestinal  The patient has history of PUD, GERD managed on pantoprazole (instructed to continue as prescribed in the perioperative period)    Eat 10- 0,  self-perceived oropharyngeal dysphagia scale (0-40)     Genitourinary  No diagnoses or significant findings on chart review or clinical presentation and evaluation.    Renal  No renal diagnoses or significant findings on chart review or clinical presentation and evaluation. The patient has specific risk factors associated with increased risk of perioperative renal complications related to age greater than 55, male gender, hypertension, diabetes mellitus, COPD. Preventative measures include preoperative hydration.    Hematology  No diagnoses or significant findings on chart review or clinical presentation and evaluation.    Caprini score 11, high risk of perioperative VTE.     Patient instructed to ambulate as soon as possible postoperatively to decrease thromboembolic risk. Initiate mechanical DVT prophylaxis as soon as possible and initiate chemical prophylaxis when deemed safe from a bleeding standpoint post surgery.     Transfusion Evaluation  A type and screen was obtained given the likelihood for perioperative transfusion of blood or blood products.    Musculoskeletal  No diagnoses or significant findings on chart review or clinical presentation and evaluation.    ID  No diagnoses or significant findings on chart review or clinical presentation and evaluation.    -Preoperative medication instructions  were provided and reviewed with the patient.  Any additional testing or evaluation was explained to the patient.  NPO Instructions were discussed, and the patient's questions were answered prior to conclusion of this encounter. Patient verbalized understanding of preoperative instructions. After Visit Summary given.

## 2025-04-09 NOTE — CPM/PAT H&P
CPM/PAT Evaluation       Name: Jeff Tapia (Jeff Tapia)  /Age: 1950/74 y.o.     Visit Type:   In-Person       Chief Complaint: perioperative evaluation      HPI  The patient is a 74 year old male with history of bilateral parotid gland tumors. 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 Jang in  with hospital course complicated by COPD requiring 5 day admission. He now has left parotid mass, in the setting of multiple hospitalizations for sialadenitis requiring steroids and antibiotics. He is referred today by Dr. Patrick Jang for perioperative evaluation in anticipation of EXCISION, PAROTID GLAND - Left 25.     Past Medical History:   Diagnosis Date    Delayed emergence from general anesthesia     GERD (gastroesophageal reflux disease)     Hypertension     Peptic ulcer disease     Type 2 diabetes mellitus        History reviewed. No pertinent surgical history.    Patient  has no history on file for sexual activity.    Family History   Problem Relation Name Age of Onset    Leukemia Mother      Heart attack Father      Diabetes Father      Heart attack Brother      Diabetes Brother         No Known Allergies    Prior to Admission medications    Medication Sig Start Date End Date Taking? Authorizing Provider   amoxicillin-pot clavulanate (Augmentin) 875-125 mg tablet Take 1 tablet by mouth 2 times a day. 25  Patrick Jang MD   aspirin 81 mg EC tablet Take 1 tablet (81 mg) by mouth once daily.    Historical Provider, MD   gabapentin (Neurontin) 300 mg capsule Take 1 capsule (300 mg) by mouth 3 times a day for 14 days. 25  Patrick Jang MD   glipiZIDE (Glucotrol) 5 mg tablet Take 1 tablet (5 mg) by mouth 2 times a day.    Historical Provider, MD   metFORMIN (Glucophage) 1,000 mg tablet Take 1 tablet (1,000 mg) by mouth 2 times a day.    Historical Provider, MD   metoprolol tartrate (Lopressor) 25 mg tablet Take 1 tablet (25 mg) by mouth once daily.     "Historical Provider, MD   omega 3-dha-epa-fish oil 100-400-1,000 mg capsule Take 1,000 mg by mouth once daily.    Historical Provider, MD   pantoprazole (ProtoNix) 40 mg EC tablet Take 1 tablet (40 mg) by mouth once daily.    Historical Provider, MD COVARRUBIAS ROS:   Constitutional:   neg    Neuro/Psych:   neg    Eyes:   neg    Ears:   neg    Nose:   neg    Mouth:   neg    Throat:   neg    Neck:   neg    Cardio:   neg     YEE (chronic, unchanged)  Respiratory:   neg    Endocrine:   neg    GI:   neg    :   neg    Musculoskeletal:   neg    Hematologic:   neg    Skin:  neg        Physical Exam  Vitals reviewed.   Constitutional:       Appearance: Normal appearance.   HENT:      Head: Normocephalic and atraumatic.      Nose: Nose normal.      Mouth/Throat:      Mouth: Mucous membranes are moist.      Pharynx: Oropharynx is clear.   Eyes:      Extraocular Movements: Extraocular movements intact.      Pupils: Pupils are equal, round, and reactive to light.   Cardiovascular:      Rate and Rhythm: Normal rate and regular rhythm.      Pulses: Normal pulses.      Heart sounds: Normal heart sounds.   Pulmonary:      Effort: Pulmonary effort is normal.      Breath sounds: Normal breath sounds.   Musculoskeletal:         General: Normal range of motion.      Cervical back: Normal range of motion.   Skin:     General: Skin is warm and dry.   Neurological:      General: No focal deficit present.      Mental Status: He is alert and oriented to person, place, and time.   Psychiatric:         Mood and Affect: Mood normal.         Behavior: Behavior normal.          PAT AIRWAY:   Airway:     Mallampati::  IV    TM distance::  >3 FB    Neck ROM::  Full  normal          Visit Vitals  BP (!) 173/98   Pulse 60   Temp 36.5 °C (97.7 °F)   Ht 1.854 m (6' 1\")   Wt 112 kg (247 lb 6.4 oz)   SpO2 98%   BMI 32.64 kg/m²   Smoking Status Every Day   BSA 2.4 m²       DASI Risk Score      Flowsheet Row Pre-Admission Testing from 4/9/2025 in  " [facility]   Can you take care of yourself (eat, dress, bathe, or use toilet)?  2.75 filed at 04/09/2025 1025   Can you walk indoors, such as around your house? 1.75 filed at 04/09/2025 1025   Can you walk a block or two on level ground?  2.75 filed at 04/09/2025 1025   Can you climb a flight of stairs or walk up a hill? 5.5 filed at 04/09/2025 1025   Can you run a short distance? 0 filed at 04/09/2025 1025   Can you do light work around the house like dusting or washing dishes? 2.7 filed at 04/09/2025 1025   Can you do moderate work around the house like vacuuming, sweeping floors or carrying groceries? 3.5 filed at 04/09/2025 1025   Can you do heavy work around the house like scrubbing floors or lifting and moving heavy furniture?  8 filed at 04/09/2025 1025   Can you do yard work like raking leaves, weeding or pushing a mower? 4.5 filed at 04/09/2025 1025   Can you have sexual relations? 5.25 filed at 04/09/2025 1025   Can you participate in moderate recreational activities like golf, bowling, dancing, doubles tennis or throwing a baseball or football? 0 filed at 04/09/2025 1025   Can you participate in strenous sports like swimming, singles tennis, football, basketball, or skiing? 0 filed at 04/09/2025 1025   DASI SCORE 36.7 filed at 04/09/2025 1025   METS Score (Will be calculated only when all the questions are answered) 7.3 filed at 04/09/2025 1025          [place] DVT Assessment      Flowsheet Row Pre-Admission Testing from 4/9/2025 in [facility]   DVT Score (IF A SCORE IS NOT CALCULATING, MUST SELECT A BMI TO COMPLETE) 11 filed at 04/09/2025 1110   Medical Factors COPD filed at 04/09/2025 1110   Surgical Factors Major surgery planned, lasting over 3 hours filed at 04/09/2025 1110   BMI (BMI MUST BE CHOSEN) 31-40 (Obesity) filed at 04/09/2025 1110          Modified Frailty Index      Flowsheet Row Pre-Admission Testing from 4/9/2025 in [facility]    Non-independent functional status (problems with dressing, bathing, personal grooming, or cooking) 0 filed at 04/09/2025 1110   History of diabetes mellitus  0.0909 filed at 04/09/2025 1110   History of COPD 0.0909 filed at 04/09/2025 1110   History of CHF No filed at 04/09/2025 1110   History of MI 0 filed at 04/09/2025 1110   History of Percutaneous Coronary Intervention, Cardiac Surgery, or Angina No filed at 04/09/2025 1110   Hypertension requiring the use of medication  0.0909 filed at 04/09/2025 1110   Peripheral vascular disease 0 filed at 04/09/2025 1110   Impaired sensorium (cognitive impairement or loss, clouding, or delirium) 0 filed at 04/09/2025 1110   TIA or CVA withouy residual deficit 0 filed at 04/09/2025 1110   Cerebrovascular accident with deficit 0 filed at 04/09/2025 1110   Modified Frailty Index Calculator .2727 filed at 04/09/2025 1110          [id number] Stroke Risk Points         N/A 0 to 9 Points:      Last Change: N/A          The [id number] risk score (Lip GH, et al. 2009. © 2010 [employer]) quantifies the risk of stroke for a patient with atrial fibrillation. For patients without atrial fibrillation or under the age of 18 this score appears as N/A. Higher score values generally indicate higher risk of stroke.        This score is not applicable to this patient. Components are not calculated.          Revised Cardiac Risk Index      Flowsheet Row Pre-Admission Testing from 4/9/2025 in [facility]   High-Risk Surgery (Intraperitoneal, Intrathoracic,Suprainguinal vascular) 0 filed at 04/09/2025 1109   History of ischemic heart disease (History of MI, History of positive exercuse test, Current chest paint considered due to myocardial ischemia, Use of nitrate therapy, ECG with pathological Q Waves) 0 filed at 04/09/2025 1109   History of congestive heart failure (pulmonary edemia, bilateral rales or S3 gallop, Paroxysmal nocturnal dyspnea, CXR  showing pulmonary vascular redistribution) 0 filed at 04/09/2025 1109   History of cerebrovascular disease (Prior TIA or stroke) 0 filed at 04/09/2025 1109   Pre-operative insulin treatment 0 filed at 04/09/2025 1109   Pre-operative creatinine>2 mg/dl 0 filed at 04/09/2025 1109   Revised Cardiac Risk Calculator 0 filed at 04/09/2025 1109          Apfel Simplified Score      Flowsheet Row Pre-Admission Testing from 4/9/2025 in [facility]   Smoking status 0 filed at 04/09/2025 1110   History of motion sickness or PONV  0 filed at 04/09/2025 1110   Use of postoperative opioids 1 filed at 04/09/2025 1110   Gender - Female 0=No filed at 04/09/2025 1110   Apfel Simplified Score Calculator 1 filed at 04/09/2025 1110          Risk Analysis Index Results This Encounter    No data found in the last 10 encounters.       Stop Bang Score      Flowsheet Row Pre-Admission Testing from 4/9/2025 in [facility]   Do you snore loudly? 1 filed at 04/09/2025 1025   Do you often feel tired or fatigued after your sleep? 0 filed at 04/09/2025 1025   Has anyone ever observed you stop breathing in your sleep? 1 filed at 04/09/2025 1025   Do you have or are you being treated for high blood pressure? 1 filed at 04/09/2025 1025   Recent BMI (Calculated) 31.4 filed at 04/09/2025 1025   Is BMI greater than 35 kg/m2? 0=No filed at 04/09/2025 1025   Age older than 50 years old? 1=Yes filed at 04/09/2025 1025   Is your neck circumference greater than 17 inches (Male) or 16 inches (Female)? 0 filed at 04/09/2025 1025   Gender - Male 1=Yes filed at 04/09/2025 1025   STOP-BANG Total Score 5 filed at 04/09/2025 1025          Prodigy: High Risk  Total Score: 20              Prodigy Age Score      Prodigy Gender Score          ARISCAT Score for Postoperative Pulmonary Complications    No data to display       [place] Perioperative Risk for Myocardial Infarction or Cardiac Arrest (MICHAEL)    No data to display       Recent  Results (from the past 4 weeks)   CBC    Collection Time: 04/09/25 11:02 AM   Result Value Ref Range    WBC 6.5 4.4 - 11.3 x10*3/uL    nRBC 0.0 0.0 - 0.0 /100 WBCs    RBC 4.35 (L) 4.50 - 5.90 x10*6/uL    Hemoglobin 11.5 (L) 13.5 - 17.5 g/dL    Hematocrit 35.5 (L) 41.0 - 52.0 %    MCV 82 80 - 100 fL    MCH 26.4 26.0 - 34.0 pg    MCHC 32.4 32.0 - 36.0 g/dL    RDW 14.4 11.5 - 14.5 %    Platelets 234 150 - 450 x10*3/uL   Basic Metabolic Panel    Collection Time: 04/09/25 11:02 AM   Result Value Ref Range    Glucose 67 (L) 74 - 99 mg/dL    Sodium 137 136 - 145 mmol/L    Potassium 4.9 3.5 - 5.3 mmol/L    Chloride 104 98 - 107 mmol/L    Bicarbonate 22 21 - 32 mmol/L    Anion Gap 16 10 - 20 mmol/L    Urea Nitrogen 14 6 - 23 mg/dL    Creatinine 1.41 (H) 0.50 - 1.30 mg/dL    eGFR 52 (L) >60 mL/min/1.73m*2    Calcium 9.5 8.6 - 10.6 mg/dL   Type And Screen Is this order related to pregnancy or an upcoming surgery? Yes; Where will this surgery/delivery be performed? [facility]; What is the date of the surgery? 4/16/2025; Has this patient ever had a transfusion? Unknown; ...    Collection Time: 04/09/25 11:02 AM   Result Value Ref Range    ABO TYPE O     Rh TYPE POS     ANTIBODY SCREEN NEG         Diagnostic Results    EKG 4/9/25    EKG 1/4/21  Normal sinus rhythm  Incomplete right bundle branch block  Borderline ECG  No previous ECGs available    Assessment and Plan:     Anesthesia  The patient notes anesthesia complications in the past related to delayed emergence.     Neurology  The patient has no neurological diagnoses or significant findings on chart review, clinical presentation, and evaluation.     No grossly apparent neurological perioperative risk.     The patient is at increased risk for postoperative delirium secondary to age 65 or older. The patient is at increased risk for perioperative stroke secondary to hypertension , increased age, diabetes mellitus, general anesthesia, operative time >2.5 hours.  Handouts for preoperative brain exercises given to patient.    HEENT/Airway  The patient has history of warthin's tumor s/p excision, left parotid gland tumor,multiple episodes of sialadenitis requiring admission for IV abx. scheduled for surgery with Dr. Jang on 2/25/25. No documented or reported history of airway difficulty.     Cardiovascular  The patient has hypertension managed on imdur, metoprolol (instructed to continue as prescribed in the perioperative period). BP elevated today 173/98. Patient's daughter reports home /80.    METS  The patient's functional capacity is greater than 4 METS.  RCRI  The patient meets 0 RCRI criteria and therefor has a 3.9% risk of major adverse cardiac complications.  MICHAEL score which indicates a 0.7% risk of intraoperative or 30-day postoperative MACE (major adverse cardiac event).    Cardiology Evaluation  The patient is not followed by cardiology.    He is scheduled for non-cardiac surgery associated with elevated risk. The patient has no major cardiac contraindications to non- cardiac surgery.    Pulmonary  The patient has COPD noncompliant with inhalers. Denies any recent URIs, exacerbations or hospitalizations. Smoking cessation discussed, patient not ready to quit.    The patient is at increased risk of perioperative pulmonary complications secondary to neck surgery, chronic obstructive lung disease, advanced age greater than 60.     STOP BANG 5, which places patient at high risk for having LYNNETTE.    Recommend prioritizing non opioid analgesic techniques (regional and local anesthesia, nonsteroidal medications, etc) before the administration of opioids and close monitoring for hypoventilation after surgery due to LYNNETTE/supsected LYNNETTE. If intravenous narcotics are needed beyond the immediate twin-operative period, the patient may benefit from continuous pulse oximetry to monitor for hypoxic events till baseline Sp02 is normal on room air and  a respiratory therapy  evaluation.    ARISCAT 26, Intermediate, 13.3% risk of in-hospital postoperative pulmonary complications  PRODIGY 25, high risk of respiratory depression episode.     Patient given PI sheet for preoperative deep breathing exercises.  Encourage  incentive spirometry in the postoperative period as deemed necessary.    Endocrine  The patient has diabetes mellitus managed on metformin, glipizide (hold morning of surgery),managed by PCP. A1C 5.0 on 1/25/25.    Gastrointestinal  The patient has history of PUD, GERD managed on pantoprazole (instructed to continue as prescribed in the perioperative period)    Eat 10- 0,  self-perceived oropharyngeal dysphagia scale (0-40)     Genitourinary  No diagnoses or significant findings on chart review or clinical presentation and evaluation.    Renal  No renal diagnoses or significant findings on chart review or clinical presentation and evaluation. The patient has specific risk factors associated with increased risk of perioperative renal complications related to age greater than 55, male gender, hypertension, diabetes mellitus, COPD. Preventative measures include preoperative hydration.    Hematology  No diagnoses or significant findings on chart review or clinical presentation and evaluation.    Caprini score 11, high risk of perioperative VTE.     Patient instructed to ambulate as soon as possible postoperatively to decrease thromboembolic risk. Initiate mechanical DVT prophylaxis as soon as possible and initiate chemical prophylaxis when deemed safe from a bleeding standpoint post surgery.     Transfusion Evaluation  A type and screen was obtained given the likelihood for perioperative transfusion of blood or blood products.    Musculoskeletal  No diagnoses or significant findings on chart review or clinical presentation and evaluation.    ID  No diagnoses or significant findings on chart review or clinical presentation and evaluation.    -Preoperative medication instructions  were provided and reviewed with the patient.  Any additional testing or evaluation was explained to the patient.  NPO Instructions were discussed, and the patient's questions were answered prior to conclusion of this encounter. Patient verbalized understanding of preoperative instructions. After Visit Summary given.

## 2025-04-09 NOTE — PREPROCEDURE INSTRUCTIONS
Fasting Guidelines    NPO Instructions:    With sedation, food or liquid in your stomach can enter your lungs causing serious complications  Increases nausea and vomiting    When do I need to stop eating and drinking before my surgery?  Do not eat any food after midnight the night before your surgery/procedure  You may have up to 13.5 ounces of clear liquids until TWO hours before your instructed arrival time to the hospital. This includes water, black tea/coffee (no milk or creamer), apple juice, and electrolyte replacement beverages (Gatorade)  You may chew gum until TWO hours before your surgery/procedure    Additional Instructions:     Avoid herbal supplements, multivitamins and NSAIDS (non-steroidal anti-inflammatory drugs) such as Advil, Aleve, Ibuprofen, Naproxen, Excedrin, Meloxicam or Celebrex for at least 7 days prior to surgery. May take Tylenol as needed.    Avoid tobacco and alcohol products for 24 hours prior to surgery.    CONTACT SURGEON'S OFFICE IF YOU DEVELOP:  * Fever = 100.4 F   * New respiratory symptoms (e.g. cough, shortness of breath, respiratory distress, sore throat)  * Recent loss of taste or smell  *Flu like symptoms such as headache, fatigue or gastrointestinal symptoms  * You develop any open sores, shingles, burning or painful urination   AND/OR:  * You no longer wish to have the surgery.  * Any other personal circumstances change that may lead to the need to cancel or defer this surgery.  *You were admitted to any hospital within one week of your planned procedure.    Seven/Six Days before Surgery:  Review your medication instructions, stop indicated medications    Day of Surgery:  Review your medication instructions, take indicated medications  Wear comfortable loose fitting clothing  Do not use moisturizers, creams, lotions or perfume  All jewelry and valuables should be left at home    Stu Steinberg [facility]  [facility]  Duqzi-[phone]  Ctt-[phone]  [email or web address]              Preoperative Brain Exercises    What are brain exercises?  A brain exercise is any activity that engages your thinking (cognitive) skills.    What types of activities are considered brain exercises?  Jigsaw puzzles, crossword puzzles, word jumble, memory games, word search, and many more.  Many can be found free online or on your phone via a mobile mani.    Why should I do brain exercises before my surgery?  More recent research has shown brain exercise before surgery can lower the risk of postoperative delirium (confusion) which can be especially important for older adults.  Patients who did brain exercises for 5 to 10 hours the days before surgery, cut their risk of postoperative delirium in half up to 1 week after surgery.         [facility]    Preoperative Deep Breathing Exercises    Why it is important to do deep breathing exercises before my surgery?  Deep breathing exercises strengthen your breathing muscles.  This helps you to recover after your surgery and decreases the chance of breathing complications.      How are the deep breathing exercises done?  Sit straight with your back supported.  Breathe in deeply and slowly through your nose. Your lower rib cage should expand and your abdomen may move forward.  Hold that breath for 3 to 5 seconds.  Breathe out through pursed lips, slowly and completely.  Rest and repeat 10 times every hour while awake.  Rest longer if you become dizzy or lightheaded.         Patient and Family Education             Ways You Can Help Prevent Blood Clots             This handout explains some simple things you can do to help prevent blood clots.      Blood clots are blockages that can form in the body's veins. When a blood clot forms in your deep veins, it may be called a deep vein thrombosis, or DVT for short. Blood clots can happen in any part of the  body where blood flows, but they are most common in the arms and legs. If a piece of a blood clot breaks free and travels to the lungs, it is called a pulmonary embolus (PE). A PE can be a very serious problem.         Being in the hospital or having surgery can raise your chances of getting a blood clot because you may not be well enough to move around as much as you normally do.         Ways you can help prevent blood clots in the hospital         Wearing SCDs. SCDs stands for Sequential Compression Devices.   SCDs are special sleeves that wrap around your legs  They attach to a pump that fills them with air to gently squeeze your legs every few minutes.   This helps return the blood in your legs to your heart.   SCDs should only be taken off when walking or bathing.   SCDs may not be comfortable, but they can help save your life.               Wearing compression stockings - if your doctor orders them. These special snug fitting stockings gently squeeze your legs to help blood flow.       Walking. Walking helps move the blood in your legs.   If your doctor says it is ok, try walking the halls at least   5 times a day. Ask us to help you get up, so you don't fall.      Taking any blood thinning medicines your doctor orders.        Page 1 of 2     [facility]; 3/23   Ways you can help prevent blood clots at home       Wearing compression stockings - if your doctor orders them. ? Walking - to help move the blood in your legs.       Taking any blood thinning medicines your doctor orders.      Signs of a blood clot or PE      Tell your doctor or nurse know right away if you have of the problems listed below.    If you are at home, seek medical care right away. Call 911 for chest pain or problems breathing.               Signs of a blood clot (DVT) - such as pain,  swelling, redness or warmth in your arm or leg      Signs of a pulmonary embolism (PE) - such as chest     pain or feeling short of breath

## 2025-04-15 ENCOUNTER — ANESTHESIA EVENT (OUTPATIENT)
Dept: OPERATING ROOM | Facility: HOSPITAL | Age: 75
End: 2025-04-15
Payer: MEDICARE

## 2025-04-16 ENCOUNTER — ANESTHESIA (OUTPATIENT)
Dept: OPERATING ROOM | Facility: HOSPITAL | Age: 75
End: 2025-04-16
Payer: MEDICARE

## 2025-04-16 ENCOUNTER — HOSPITAL ENCOUNTER (OUTPATIENT)
Facility: HOSPITAL | Age: 75
LOS: 1 days | Discharge: HOME | End: 2025-04-17
Attending: OTOLARYNGOLOGY | Admitting: OTOLARYNGOLOGY
Payer: MEDICARE

## 2025-04-16 DIAGNOSIS — K11.8 MASS OF LEFT PAROTID GLAND: Primary | ICD-10-CM

## 2025-04-16 LAB
ABO GROUP (TYPE) IN BLOOD: NORMAL
GLUCOSE BLD MANUAL STRIP-MCNC: 105 MG/DL (ref 74–99)
GLUCOSE BLD MANUAL STRIP-MCNC: 197 MG/DL (ref 74–99)
GLUCOSE BLD MANUAL STRIP-MCNC: 222 MG/DL (ref 74–99)
GLUCOSE BLD MANUAL STRIP-MCNC: 97 MG/DL (ref 74–99)
RH FACTOR (ANTIGEN D): NORMAL

## 2025-04-16 PROCEDURE — 2500000004 HC RX 250 GENERAL PHARMACY W/ HCPCS (ALT 636 FOR OP/ED): Mod: JZ | Performed by: STUDENT IN AN ORGANIZED HEALTH CARE EDUCATION/TRAINING PROGRAM

## 2025-04-16 PROCEDURE — 3600000003 HC OR TIME - INITIAL BASE CHARGE - PROCEDURE LEVEL THREE: Performed by: OTOLARYNGOLOGY

## 2025-04-16 PROCEDURE — 3600000009 HC OR TIME - EACH INCREMENTAL 1 MINUTE - PROCEDURE LEVEL FOUR: Performed by: OTOLARYNGOLOGY

## 2025-04-16 PROCEDURE — 3600000008 HC OR TIME - EACH INCREMENTAL 1 MINUTE - PROCEDURE LEVEL THREE: Performed by: OTOLARYNGOLOGY

## 2025-04-16 PROCEDURE — 7100000002 HC RECOVERY ROOM TIME - EACH INCREMENTAL 1 MINUTE: Performed by: OTOLARYNGOLOGY

## 2025-04-16 PROCEDURE — 2720000007 HC OR 272 NO HCPCS: Performed by: OTOLARYNGOLOGY

## 2025-04-16 PROCEDURE — 3700000001 HC GENERAL ANESTHESIA TIME - INITIAL BASE CHARGE: Performed by: OTOLARYNGOLOGY

## 2025-04-16 PROCEDURE — 96372 THER/PROPH/DIAG INJ SC/IM: CPT | Performed by: STUDENT IN AN ORGANIZED HEALTH CARE EDUCATION/TRAINING PROGRAM

## 2025-04-16 PROCEDURE — 2500000004 HC RX 250 GENERAL PHARMACY W/ HCPCS (ALT 636 FOR OP/ED): Performed by: ANESTHESIOLOGIST ASSISTANT

## 2025-04-16 PROCEDURE — 82947 ASSAY GLUCOSE BLOOD QUANT: CPT

## 2025-04-16 PROCEDURE — 99100 ANES PT EXTEME AGE<1 YR&>70: CPT | Performed by: ANESTHESIOLOGY

## 2025-04-16 PROCEDURE — 7100000001 HC RECOVERY ROOM TIME - INITIAL BASE CHARGE: Performed by: OTOLARYNGOLOGY

## 2025-04-16 PROCEDURE — 42420 EXCISE PAROTID GLAND/LESION: CPT | Performed by: OTOLARYNGOLOGY

## 2025-04-16 PROCEDURE — 88307 TISSUE EXAM BY PATHOLOGIST: CPT | Performed by: STUDENT IN AN ORGANIZED HEALTH CARE EDUCATION/TRAINING PROGRAM

## 2025-04-16 PROCEDURE — 2500000005 HC RX 250 GENERAL PHARMACY W/O HCPCS: Performed by: STUDENT IN AN ORGANIZED HEALTH CARE EDUCATION/TRAINING PROGRAM

## 2025-04-16 PROCEDURE — 3700000002 HC GENERAL ANESTHESIA TIME - EACH INCREMENTAL 1 MINUTE: Performed by: OTOLARYNGOLOGY

## 2025-04-16 PROCEDURE — 2500000005 HC RX 250 GENERAL PHARMACY W/O HCPCS: Performed by: OTOLARYNGOLOGY

## 2025-04-16 PROCEDURE — 2500000004 HC RX 250 GENERAL PHARMACY W/ HCPCS (ALT 636 FOR OP/ED): Mod: JZ | Performed by: ANESTHESIOLOGY

## 2025-04-16 PROCEDURE — 7100000011 HC EXTENDED STAY RECOVERY HOURLY - NURSING UNIT

## 2025-04-16 PROCEDURE — A42420 PR EXC PAROTD,TOTAL,DISSECT 5TH NERV: Performed by: ANESTHESIOLOGY

## 2025-04-16 PROCEDURE — 36415 COLL VENOUS BLD VENIPUNCTURE: CPT | Performed by: ANESTHESIOLOGY

## 2025-04-16 PROCEDURE — 2500000004 HC RX 250 GENERAL PHARMACY W/ HCPCS (ALT 636 FOR OP/ED): Performed by: OTOLARYNGOLOGY

## 2025-04-16 PROCEDURE — 88305 TISSUE EXAM BY PATHOLOGIST: CPT | Performed by: STUDENT IN AN ORGANIZED HEALTH CARE EDUCATION/TRAINING PROGRAM

## 2025-04-16 PROCEDURE — 2500000001 HC RX 250 WO HCPCS SELF ADMINISTERED DRUGS (ALT 637 FOR MEDICARE OP): Performed by: STUDENT IN AN ORGANIZED HEALTH CARE EDUCATION/TRAINING PROGRAM

## 2025-04-16 PROCEDURE — A42420 PR EXC PAROTD,TOTAL,DISSECT 5TH NERV: Performed by: ANESTHESIOLOGIST ASSISTANT

## 2025-04-16 PROCEDURE — 88331 PATH CONSLTJ SURG 1 BLK 1SPC: CPT | Mod: TC,SUR | Performed by: PATHOLOGY

## 2025-04-16 PROCEDURE — 88307 TISSUE EXAM BY PATHOLOGIST: CPT | Mod: TC,SUR | Performed by: OTOLARYNGOLOGY

## 2025-04-16 PROCEDURE — P9045 ALBUMIN (HUMAN), 5%, 250 ML: HCPCS | Mod: JZ,TB | Performed by: ANESTHESIOLOGIST ASSISTANT

## 2025-04-16 PROCEDURE — 3600000004 HC OR TIME - INITIAL BASE CHARGE - PROCEDURE LEVEL FOUR: Performed by: OTOLARYNGOLOGY

## 2025-04-16 PROCEDURE — 2500000004 HC RX 250 GENERAL PHARMACY W/ HCPCS (ALT 636 FOR OP/ED): Performed by: ANESTHESIOLOGY

## 2025-04-16 PROCEDURE — 2500000004 HC RX 250 GENERAL PHARMACY W/ HCPCS (ALT 636 FOR OP/ED): Mod: JZ

## 2025-04-16 RX ORDER — ALBUTEROL SULFATE 0.83 MG/ML
2.5 SOLUTION RESPIRATORY (INHALATION) ONCE AS NEEDED
Status: DISCONTINUED | OUTPATIENT
Start: 2025-04-16 | End: 2025-04-16 | Stop reason: HOSPADM

## 2025-04-16 RX ORDER — SODIUM CHLORIDE, SODIUM LACTATE, POTASSIUM CHLORIDE, CALCIUM CHLORIDE 600; 310; 30; 20 MG/100ML; MG/100ML; MG/100ML; MG/100ML
INJECTION, SOLUTION INTRAVENOUS CONTINUOUS PRN
Status: DISCONTINUED | OUTPATIENT
Start: 2025-04-16 | End: 2025-04-16

## 2025-04-16 RX ORDER — POLYETHYLENE GLYCOL 3350 17 G/17G
17 POWDER, FOR SOLUTION ORAL DAILY
Status: DISCONTINUED | OUTPATIENT
Start: 2025-04-16 | End: 2025-04-17 | Stop reason: HOSPADM

## 2025-04-16 RX ORDER — ALBUMIN HUMAN 50 G/1000ML
SOLUTION INTRAVENOUS AS NEEDED
Status: DISCONTINUED | OUTPATIENT
Start: 2025-04-16 | End: 2025-04-16

## 2025-04-16 RX ORDER — TRAMADOL HYDROCHLORIDE 50 MG/1
50 TABLET ORAL EVERY 8 HOURS PRN
Status: DISCONTINUED | OUTPATIENT
Start: 2025-04-16 | End: 2025-04-17 | Stop reason: HOSPADM

## 2025-04-16 RX ORDER — OXYCODONE HYDROCHLORIDE 5 MG/1
5 TABLET ORAL EVERY 4 HOURS PRN
Status: DISCONTINUED | OUTPATIENT
Start: 2025-04-16 | End: 2025-04-16 | Stop reason: HOSPADM

## 2025-04-16 RX ORDER — OXYCODONE HYDROCHLORIDE 5 MG/1
10 TABLET ORAL EVERY 4 HOURS PRN
Status: DISCONTINUED | OUTPATIENT
Start: 2025-04-16 | End: 2025-04-16 | Stop reason: HOSPADM

## 2025-04-16 RX ORDER — BACITRACIN 500 [USP'U]/G
OINTMENT TOPICAL AS NEEDED
Status: DISCONTINUED | OUTPATIENT
Start: 2025-04-16 | End: 2025-04-16 | Stop reason: HOSPADM

## 2025-04-16 RX ORDER — GABAPENTIN 300 MG/1
300 CAPSULE ORAL 3 TIMES DAILY
Status: DISCONTINUED | OUTPATIENT
Start: 2025-04-16 | End: 2025-04-17 | Stop reason: HOSPADM

## 2025-04-16 RX ORDER — OXYCODONE HYDROCHLORIDE 5 MG/1
5 TABLET ORAL EVERY 4 HOURS PRN
Status: DISCONTINUED | OUTPATIENT
Start: 2025-04-16 | End: 2025-04-17 | Stop reason: HOSPADM

## 2025-04-16 RX ORDER — LABETALOL HYDROCHLORIDE 5 MG/ML
10 INJECTION, SOLUTION INTRAVENOUS ONCE
Status: COMPLETED | OUTPATIENT
Start: 2025-04-16 | End: 2025-04-16

## 2025-04-16 RX ORDER — GLYCOPYRROLATE 0.2 MG/ML
INJECTION INTRAMUSCULAR; INTRAVENOUS AS NEEDED
Status: DISCONTINUED | OUTPATIENT
Start: 2025-04-16 | End: 2025-04-16

## 2025-04-16 RX ORDER — ACETAMINOPHEN 160 MG/5ML
1000 SOLUTION ORAL EVERY 8 HOURS SCHEDULED
Status: DISCONTINUED | OUTPATIENT
Start: 2025-04-16 | End: 2025-04-17 | Stop reason: HOSPADM

## 2025-04-16 RX ORDER — LIDOCAINE HYDROCHLORIDE AND EPINEPHRINE 10; 10 UG/ML; MG/ML
INJECTION, SOLUTION INFILTRATION; PERINEURAL AS NEEDED
Status: DISCONTINUED | OUTPATIENT
Start: 2025-04-16 | End: 2025-04-16 | Stop reason: HOSPADM

## 2025-04-16 RX ORDER — METOPROLOL TARTRATE 25 MG/1
25 TABLET, FILM COATED ORAL DAILY
Status: DISCONTINUED | OUTPATIENT
Start: 2025-04-17 | End: 2025-04-17 | Stop reason: HOSPADM

## 2025-04-16 RX ORDER — SODIUM CHLORIDE 0.9 G/100ML
INJECTION, SOLUTION IRRIGATION AS NEEDED
Status: DISCONTINUED | OUTPATIENT
Start: 2025-04-16 | End: 2025-04-16 | Stop reason: HOSPADM

## 2025-04-16 RX ORDER — ONDANSETRON HYDROCHLORIDE 2 MG/ML
4 INJECTION, SOLUTION INTRAVENOUS EVERY 8 HOURS PRN
Status: DISCONTINUED | OUTPATIENT
Start: 2025-04-16 | End: 2025-04-17 | Stop reason: HOSPADM

## 2025-04-16 RX ORDER — ONDANSETRON HYDROCHLORIDE 2 MG/ML
INJECTION, SOLUTION INTRAVENOUS AS NEEDED
Status: DISCONTINUED | OUTPATIENT
Start: 2025-04-16 | End: 2025-04-16

## 2025-04-16 RX ORDER — FENTANYL CITRATE 50 UG/ML
INJECTION, SOLUTION INTRAMUSCULAR; INTRAVENOUS AS NEEDED
Status: DISCONTINUED | OUTPATIENT
Start: 2025-04-16 | End: 2025-04-16

## 2025-04-16 RX ORDER — PROPOFOL 10 MG/ML
INJECTION, EMULSION INTRAVENOUS AS NEEDED
Status: DISCONTINUED | OUTPATIENT
Start: 2025-04-16 | End: 2025-04-16

## 2025-04-16 RX ORDER — ENOXAPARIN SODIUM 100 MG/ML
40 INJECTION SUBCUTANEOUS EVERY 24 HOURS
Status: DISCONTINUED | OUTPATIENT
Start: 2025-04-16 | End: 2025-04-17 | Stop reason: HOSPADM

## 2025-04-16 RX ORDER — SODIUM CHLORIDE, SODIUM LACTATE, POTASSIUM CHLORIDE, CALCIUM CHLORIDE 600; 310; 30; 20 MG/100ML; MG/100ML; MG/100ML; MG/100ML
100 INJECTION, SOLUTION INTRAVENOUS CONTINUOUS
Status: ACTIVE | OUTPATIENT
Start: 2025-04-16 | End: 2025-04-16

## 2025-04-16 RX ORDER — ONDANSETRON 4 MG/1
4 TABLET, ORALLY DISINTEGRATING ORAL EVERY 8 HOURS PRN
Status: DISCONTINUED | OUTPATIENT
Start: 2025-04-16 | End: 2025-04-17 | Stop reason: HOSPADM

## 2025-04-16 RX ORDER — ACETAMINOPHEN 325 MG/1
975 TABLET ORAL EVERY 8 HOURS SCHEDULED
Status: DISCONTINUED | OUTPATIENT
Start: 2025-04-16 | End: 2025-04-17 | Stop reason: HOSPADM

## 2025-04-16 RX ORDER — PHENYLEPHRINE 10 MG/250 ML(40 MCG/ML)IN 0.9 % SOD.CHLORIDE INTRAVENOUS
CONTINUOUS PRN
Status: DISCONTINUED | OUTPATIENT
Start: 2025-04-16 | End: 2025-04-16

## 2025-04-16 RX ORDER — LIDOCAINE HCL/PF 100 MG/5ML
SYRINGE (ML) INTRAVENOUS AS NEEDED
Status: DISCONTINUED | OUTPATIENT
Start: 2025-04-16 | End: 2025-04-16

## 2025-04-16 RX ORDER — PANTOPRAZOLE SODIUM 40 MG/1
40 TABLET, DELAYED RELEASE ORAL DAILY
Status: DISCONTINUED | OUTPATIENT
Start: 2025-04-17 | End: 2025-04-17 | Stop reason: HOSPADM

## 2025-04-16 RX ORDER — ASPIRIN 81 MG/1
81 TABLET ORAL DAILY
Status: DISCONTINUED | OUTPATIENT
Start: 2025-04-17 | End: 2025-04-17 | Stop reason: HOSPADM

## 2025-04-16 RX ORDER — FENTANYL CITRATE 50 UG/ML
50 INJECTION, SOLUTION INTRAMUSCULAR; INTRAVENOUS EVERY 5 MIN PRN
Status: DISCONTINUED | OUTPATIENT
Start: 2025-04-16 | End: 2025-04-16 | Stop reason: HOSPADM

## 2025-04-16 RX ORDER — NALOXONE HYDROCHLORIDE 0.4 MG/ML
0.2 INJECTION, SOLUTION INTRAMUSCULAR; INTRAVENOUS; SUBCUTANEOUS EVERY 5 MIN PRN
Status: DISCONTINUED | OUTPATIENT
Start: 2025-04-16 | End: 2025-04-17 | Stop reason: HOSPADM

## 2025-04-16 RX ORDER — MIDAZOLAM HYDROCHLORIDE 1 MG/ML
INJECTION, SOLUTION INTRAMUSCULAR; INTRAVENOUS CONTINUOUS PRN
Status: DISCONTINUED | OUTPATIENT
Start: 2025-04-16 | End: 2025-04-16

## 2025-04-16 RX ORDER — ONDANSETRON HYDROCHLORIDE 2 MG/ML
4 INJECTION, SOLUTION INTRAVENOUS ONCE AS NEEDED
Status: COMPLETED | OUTPATIENT
Start: 2025-04-16 | End: 2025-04-16

## 2025-04-16 RX ORDER — FENTANYL CITRATE 50 UG/ML
25 INJECTION, SOLUTION INTRAMUSCULAR; INTRAVENOUS EVERY 5 MIN PRN
Status: DISCONTINUED | OUTPATIENT
Start: 2025-04-16 | End: 2025-04-16 | Stop reason: HOSPADM

## 2025-04-16 RX ORDER — ISOSORBIDE MONONITRATE 30 MG/1
30 TABLET, EXTENDED RELEASE ORAL
Status: DISCONTINUED | OUTPATIENT
Start: 2025-04-17 | End: 2025-04-17 | Stop reason: HOSPADM

## 2025-04-16 RX ORDER — METOCLOPRAMIDE HYDROCHLORIDE 5 MG/ML
10 INJECTION INTRAMUSCULAR; INTRAVENOUS ONCE AS NEEDED
Status: DISCONTINUED | OUTPATIENT
Start: 2025-04-16 | End: 2025-04-16 | Stop reason: HOSPADM

## 2025-04-16 RX ORDER — CEFAZOLIN 1 G/1
INJECTION, POWDER, FOR SOLUTION INTRAVENOUS AS NEEDED
Status: DISCONTINUED | OUTPATIENT
Start: 2025-04-16 | End: 2025-04-16

## 2025-04-16 RX ORDER — LIDOCAINE HYDROCHLORIDE 10 MG/ML
0.1 INJECTION, SOLUTION INFILTRATION; PERINEURAL ONCE
Status: DISCONTINUED | OUTPATIENT
Start: 2025-04-16 | End: 2025-04-16 | Stop reason: HOSPADM

## 2025-04-16 RX ORDER — SUCCINYLCHOLINE CHLORIDE 20 MG/ML
INJECTION INTRAMUSCULAR; INTRAVENOUS AS NEEDED
Status: DISCONTINUED | OUTPATIENT
Start: 2025-04-16 | End: 2025-04-16

## 2025-04-16 RX ORDER — PHENYLEPHRINE HYDROCHLORIDE 10 MG/ML
INJECTION INTRAVENOUS AS NEEDED
Status: DISCONTINUED | OUTPATIENT
Start: 2025-04-16 | End: 2025-04-16

## 2025-04-16 RX ADMIN — GABAPENTIN 300 MG: 300 CAPSULE ORAL at 21:25

## 2025-04-16 RX ADMIN — LABETALOL HYDROCHLORIDE 10 MG: 5 INJECTION INTRAVENOUS at 13:08

## 2025-04-16 RX ADMIN — ENOXAPARIN SODIUM 40 MG: 100 INJECTION SUBCUTANEOUS at 21:25

## 2025-04-16 RX ADMIN — CEFAZOLIN 2 G: 330 INJECTION, POWDER, FOR SOLUTION INTRAMUSCULAR; INTRAVENOUS at 08:53

## 2025-04-16 RX ADMIN — PROPOFOL 50 MG: 10 INJECTION, EMULSION INTRAVENOUS at 08:46

## 2025-04-16 RX ADMIN — WHITE PETROLATUM 57.7 %-MINERAL OIL 31.9 % EYE OINTMENT 1 APPLICATION: at 21:25

## 2025-04-16 RX ADMIN — POLYETHYLENE GLYCOL 3350 17 G: 17 POWDER, FOR SOLUTION ORAL at 18:22

## 2025-04-16 RX ADMIN — PHENYLEPHRINE-NACL IV SOLUTION 10 MG/250ML-0.9% 0.4 MCG/KG/MIN: 10-0.9/25 SOLUTION at 09:31

## 2025-04-16 RX ADMIN — REMIFENTANIL HYDROCHLORIDE 0.08 MCG/KG/MIN: 1 INJECTION, POWDER, LYOPHILIZED, FOR SOLUTION INTRAVENOUS at 08:41

## 2025-04-16 RX ADMIN — FENTANYL CITRATE 50 MCG: 50 INJECTION, SOLUTION INTRAMUSCULAR; INTRAVENOUS at 08:31

## 2025-04-16 RX ADMIN — DEXAMETHASONE SODIUM PHOSPHATE 10 MG: 4 INJECTION, SOLUTION INTRA-ARTICULAR; INTRALESIONAL; INTRAMUSCULAR; INTRAVENOUS; SOFT TISSUE at 09:44

## 2025-04-16 RX ADMIN — FENTANYL CITRATE 50 MCG: 50 INJECTION, SOLUTION INTRAMUSCULAR; INTRAVENOUS at 08:53

## 2025-04-16 RX ADMIN — PHENYLEPHRINE HYDROCHLORIDE 120 MCG: 10 INJECTION INTRAVENOUS at 09:31

## 2025-04-16 RX ADMIN — SODIUM CHLORIDE, SODIUM LACTATE, POTASSIUM CHLORIDE, CALCIUM CHLORIDE: 600; 310; 30; 20 INJECTION, SOLUTION INTRAVENOUS at 08:45

## 2025-04-16 RX ADMIN — PHENYLEPHRINE HYDROCHLORIDE 80 MCG: 10 INJECTION INTRAVENOUS at 09:25

## 2025-04-16 RX ADMIN — ACETAMINOPHEN 975 MG: 325 TABLET, FILM COATED ORAL at 18:20

## 2025-04-16 RX ADMIN — ONDANSETRON 4 MG: 2 INJECTION INTRAMUSCULAR; INTRAVENOUS at 13:20

## 2025-04-16 RX ADMIN — GLYCOPYRROLATE 0.2 MG: 0.2 INJECTION, SOLUTION INTRAMUSCULAR; INTRAVENOUS at 08:45

## 2025-04-16 RX ADMIN — SODIUM CHLORIDE, POTASSIUM CHLORIDE, SODIUM LACTATE AND CALCIUM CHLORIDE: 600; 310; 30; 20 INJECTION, SOLUTION INTRAVENOUS at 08:17

## 2025-04-16 RX ADMIN — PHENYLEPHRINE HYDROCHLORIDE 120 MCG: 10 INJECTION INTRAVENOUS at 09:21

## 2025-04-16 RX ADMIN — LIDOCAINE HYDROCHLORIDE 60 MG: 20 INJECTION INTRAVENOUS at 08:31

## 2025-04-16 RX ADMIN — ALBUMIN HUMAN 250 ML: 0.05 INJECTION, SOLUTION INTRAVENOUS at 09:34

## 2025-04-16 RX ADMIN — LABETALOL HYDROCHLORIDE 10 MG: 5 INJECTION INTRAVENOUS at 12:54

## 2025-04-16 RX ADMIN — PROPOFOL 20 MG: 10 INJECTION, EMULSION INTRAVENOUS at 08:32

## 2025-04-16 RX ADMIN — PROPOFOL 40 MG: 10 INJECTION, EMULSION INTRAVENOUS at 08:33

## 2025-04-16 RX ADMIN — CARBOXYMETHYLCELLULOSE SODIUM 2 DROP: 5 SOLUTION/ DROPS OPHTHALMIC at 21:25

## 2025-04-16 RX ADMIN — ONDANSETRON 4 MG: 2 INJECTION, SOLUTION INTRAMUSCULAR; INTRAVENOUS at 12:07

## 2025-04-16 RX ADMIN — SUCCINYLCHOLINE CHLORIDE 120 MG: 20 INJECTION, SOLUTION INTRAMUSCULAR; INTRAVENOUS at 08:32

## 2025-04-16 RX ADMIN — PROPOFOL 90 MG: 10 INJECTION, EMULSION INTRAVENOUS at 08:31

## 2025-04-16 SDOH — ECONOMIC STABILITY: FOOD INSECURITY: WITHIN THE PAST 12 MONTHS, YOU WORRIED THAT YOUR FOOD WOULD RUN OUT BEFORE YOU GOT THE MONEY TO BUY MORE.: NEVER TRUE

## 2025-04-16 SDOH — SOCIAL STABILITY: SOCIAL INSECURITY: DO YOU FEEL UNSAFE GOING BACK TO THE PLACE WHERE YOU ARE LIVING?: NO

## 2025-04-16 SDOH — HEALTH STABILITY: MENTAL HEALTH: HOW OFTEN DO YOU HAVE SIX OR MORE DRINKS ON ONE OCCASION?: NEVER

## 2025-04-16 SDOH — ECONOMIC STABILITY: HOUSING INSECURITY: IN THE PAST 12 MONTHS, HOW MANY TIMES HAVE YOU MOVED WHERE YOU WERE LIVING?: 0

## 2025-04-16 SDOH — HEALTH STABILITY: MENTAL HEALTH: HOW OFTEN DO YOU HAVE A DRINK CONTAINING ALCOHOL?: NEVER

## 2025-04-16 SDOH — SOCIAL STABILITY: SOCIAL INSECURITY: DOES ANYONE TRY TO KEEP YOU FROM HAVING/CONTACTING OTHER FRIENDS OR DOING THINGS OUTSIDE YOUR HOME?: NO

## 2025-04-16 SDOH — SOCIAL STABILITY: SOCIAL INSECURITY: WITHIN THE LAST YEAR, HAVE YOU BEEN HUMILIATED OR EMOTIONALLY ABUSED IN OTHER WAYS BY YOUR PARTNER OR EX-PARTNER?: NO

## 2025-04-16 SDOH — ECONOMIC STABILITY: HOUSING INSECURITY: AT ANY TIME IN THE PAST 12 MONTHS, WERE YOU HOMELESS OR LIVING IN A SHELTER (INCLUDING NOW)?: NO

## 2025-04-16 SDOH — SOCIAL STABILITY: SOCIAL INSECURITY: WITHIN THE LAST YEAR, HAVE YOU BEEN AFRAID OF YOUR PARTNER OR EX-PARTNER?: NO

## 2025-04-16 SDOH — SOCIAL STABILITY: SOCIAL INSECURITY
WITHIN THE LAST YEAR, HAVE YOU BEEN KICKED, HIT, SLAPPED, OR OTHERWISE PHYSICALLY HURT BY YOUR PARTNER OR EX-PARTNER?: NO

## 2025-04-16 SDOH — SOCIAL STABILITY: SOCIAL INSECURITY
WITHIN THE LAST YEAR, HAVE YOU BEEN RAPED OR FORCED TO HAVE ANY KIND OF SEXUAL ACTIVITY BY YOUR PARTNER OR EX-PARTNER?: NO

## 2025-04-16 SDOH — HEALTH STABILITY: MENTAL HEALTH: CURRENT SMOKER: 1

## 2025-04-16 SDOH — ECONOMIC STABILITY: FOOD INSECURITY: WITHIN THE PAST 12 MONTHS, THE FOOD YOU BOUGHT JUST DIDN'T LAST AND YOU DIDN'T HAVE MONEY TO GET MORE.: NEVER TRUE

## 2025-04-16 SDOH — ECONOMIC STABILITY: INCOME INSECURITY: IN THE PAST 12 MONTHS HAS THE ELECTRIC, GAS, OIL, OR WATER COMPANY THREATENED TO SHUT OFF SERVICES IN YOUR HOME?: NO

## 2025-04-16 SDOH — HEALTH STABILITY: MENTAL HEALTH: HOW MANY DRINKS CONTAINING ALCOHOL DO YOU HAVE ON A TYPICAL DAY WHEN YOU ARE DRINKING?: PATIENT DOES NOT DRINK

## 2025-04-16 SDOH — SOCIAL STABILITY: SOCIAL INSECURITY: HAVE YOU HAD THOUGHTS OF HARMING ANYONE ELSE?: NO

## 2025-04-16 SDOH — ECONOMIC STABILITY: FOOD INSECURITY: HOW HARD IS IT FOR YOU TO PAY FOR THE VERY BASICS LIKE FOOD, HOUSING, MEDICAL CARE, AND HEATING?: NOT VERY HARD

## 2025-04-16 SDOH — SOCIAL STABILITY: SOCIAL INSECURITY: DO YOU FEEL ANYONE HAS EXPLOITED OR TAKEN ADVANTAGE OF YOU FINANCIALLY OR OF YOUR PERSONAL PROPERTY?: NO

## 2025-04-16 SDOH — ECONOMIC STABILITY: HOUSING INSECURITY: IN THE LAST 12 MONTHS, WAS THERE A TIME WHEN YOU WERE NOT ABLE TO PAY THE MORTGAGE OR RENT ON TIME?: NO

## 2025-04-16 SDOH — ECONOMIC STABILITY: TRANSPORTATION INSECURITY: IN THE PAST 12 MONTHS, HAS LACK OF TRANSPORTATION KEPT YOU FROM MEDICAL APPOINTMENTS OR FROM GETTING MEDICATIONS?: NO

## 2025-04-16 SDOH — SOCIAL STABILITY: SOCIAL INSECURITY: WERE YOU ABLE TO COMPLETE ALL THE BEHAVIORAL HEALTH SCREENINGS?: YES

## 2025-04-16 SDOH — SOCIAL STABILITY: SOCIAL INSECURITY: HAVE YOU HAD ANY THOUGHTS OF HARMING ANYONE ELSE?: NO

## 2025-04-16 SDOH — SOCIAL STABILITY: SOCIAL INSECURITY: ABUSE: ADULT

## 2025-04-16 SDOH — SOCIAL STABILITY: SOCIAL INSECURITY: ARE YOU OR HAVE YOU BEEN THREATENED OR ABUSED PHYSICALLY, EMOTIONALLY, OR SEXUALLY BY ANYONE?: NO

## 2025-04-16 SDOH — SOCIAL STABILITY: SOCIAL INSECURITY
ASK PARENT OR GUARDIAN: ARE THERE TIMES WHEN YOU, YOUR CHILD(REN), OR ANY MEMBER OF YOUR HOUSEHOLD FEEL UNSAFE, HARMED, OR THREATENED AROUND PERSONS WITH WHOM YOU KNOW OR LIVE?: NO

## 2025-04-16 SDOH — SOCIAL STABILITY: SOCIAL INSECURITY: HAS ANYONE EVER THREATENED TO HURT YOUR FAMILY OR YOUR PETS?: NO

## 2025-04-16 SDOH — SOCIAL STABILITY: SOCIAL INSECURITY: ARE THERE ANY APPARENT SIGNS OF INJURIES/BEHAVIORS THAT COULD BE RELATED TO ABUSE/NEGLECT?: NO

## 2025-04-16 ASSESSMENT — COGNITIVE AND FUNCTIONAL STATUS - GENERAL
MOBILITY SCORE: 24
PATIENT BASELINE BEDBOUND: NO
DAILY ACTIVITIY SCORE: 24

## 2025-04-16 ASSESSMENT — PAIN - FUNCTIONAL ASSESSMENT
PAIN_FUNCTIONAL_ASSESSMENT: UNABLE TO SELF-REPORT
PAIN_FUNCTIONAL_ASSESSMENT: UNABLE TO SELF-REPORT
PAIN_FUNCTIONAL_ASSESSMENT: 0-10
PAIN_FUNCTIONAL_ASSESSMENT: 0-10
PAIN_FUNCTIONAL_ASSESSMENT: UNABLE TO SELF-REPORT
PAIN_FUNCTIONAL_ASSESSMENT: 0-10
PAIN_FUNCTIONAL_ASSESSMENT: UNABLE TO SELF-REPORT
PAIN_FUNCTIONAL_ASSESSMENT: 0-10
PAIN_FUNCTIONAL_ASSESSMENT: UNABLE TO SELF-REPORT
PAIN_FUNCTIONAL_ASSESSMENT: UNABLE TO SELF-REPORT
PAIN_FUNCTIONAL_ASSESSMENT: 0-10
PAIN_FUNCTIONAL_ASSESSMENT: UNABLE TO SELF-REPORT

## 2025-04-16 ASSESSMENT — PAIN SCALES - GENERAL
PAINLEVEL_OUTOF10: 0 - NO PAIN
PAIN_LEVEL: 1
PAINLEVEL_OUTOF10: 0 - NO PAIN
PAINLEVEL_OUTOF10: 2
PAINLEVEL_OUTOF10: 0 - NO PAIN

## 2025-04-16 ASSESSMENT — LIFESTYLE VARIABLES
AUDIT-C TOTAL SCORE: 0
SKIP TO QUESTIONS 9-10: 1

## 2025-04-16 ASSESSMENT — ACTIVITIES OF DAILY LIVING (ADL)
FEEDING YOURSELF: INDEPENDENT
JUDGMENT_ADEQUATE_SAFELY_COMPLETE_DAILY_ACTIVITIES: YES
GROOMING: INDEPENDENT
DRESSING YOURSELF: INDEPENDENT
PATIENT'S MEMORY ADEQUATE TO SAFELY COMPLETE DAILY ACTIVITIES?: YES
TOILETING: INDEPENDENT
ADEQUATE_TO_COMPLETE_ADL: YES
HEARING - RIGHT EAR: FUNCTIONAL
LACK_OF_TRANSPORTATION: NO
BATHING: INDEPENDENT
WALKS IN HOME: INDEPENDENT
HEARING - LEFT EAR: FUNCTIONAL

## 2025-04-16 ASSESSMENT — PATIENT HEALTH QUESTIONNAIRE - PHQ9
1. LITTLE INTEREST OR PLEASURE IN DOING THINGS: NOT AT ALL
SUM OF ALL RESPONSES TO PHQ9 QUESTIONS 1 & 2: 0
2. FEELING DOWN, DEPRESSED OR HOPELESS: NOT AT ALL

## 2025-04-16 ASSESSMENT — COLUMBIA-SUICIDE SEVERITY RATING SCALE - C-SSRS
1. IN THE PAST MONTH, HAVE YOU WISHED YOU WERE DEAD OR WISHED YOU COULD GO TO SLEEP AND NOT WAKE UP?: NO
6. HAVE YOU EVER DONE ANYTHING, STARTED TO DO ANYTHING, OR PREPARED TO DO ANYTHING TO END YOUR LIFE?: NO
2. HAVE YOU ACTUALLY HAD ANY THOUGHTS OF KILLING YOURSELF?: NO

## 2025-04-16 NOTE — ADDENDUM NOTE
Addendum  created 04/16/25 125 by Scarlet Brown MD    Clinical Note Signed, Intraprocedure Event edited

## 2025-04-16 NOTE — ADDENDUM NOTE
Addendum  created 04/16/25 1257 by Radha Hopkins MD    Intraprocedure Meds edited, Narcotic reconciliation edited

## 2025-04-16 NOTE — ANESTHESIA PROCEDURE NOTES
Peripheral IV  Date/Time: 4/16/2025 8:42 AM  Inserted by: VICKI Medina    Placement  Needle size: 18 G  Laterality: right  Location: forearm  Local anesthetic: none  Site prep: chlorhexidine  Technique: anatomical landmarks  Attempts: 1

## 2025-04-16 NOTE — CARE PLAN
The patient's goals for the shift include      The clinical goals for the shift include rest      Problem: Pain - Adult  Goal: Verbalizes/displays adequate comfort level or baseline comfort level  Outcome: Progressing     Problem: Safety - Adult  Goal: Free from fall injury  Outcome: Progressing     Problem: Discharge Planning  Goal: Discharge to home or other facility with appropriate resources  Outcome: Progressing     Problem: Nutrition  Goal: Nutrient intake appropriate for maintaining nutritional needs  Outcome: Progressing     Problem: Pain  Goal: Takes deep breaths with improved pain control throughout the shift  Outcome: Progressing  Goal: Turns in bed with improved pain control throughout the shift  Outcome: Progressing  Goal: Walks with improved pain control throughout the shift  Outcome: Progressing  Goal: Performs ADL's with improved pain control throughout shift  Outcome: Progressing  Goal: Participates in PT with improved pain control throughout the shift  Outcome: Progressing  Goal: Free from opioid side effects throughout the shift  Outcome: Progressing  Goal: Free from acute confusion related to pain meds throughout the shift  Outcome: Progressing     Problem: Respiratory  Goal: Wean oxygen to maintain O2 saturation per order/standard this shift  Outcome: Progressing  Goal: Increase self care and/or family involvement in next 24 hours  Outcome: Progressing     Problem: Skin  Goal: Decreased wound size/increased tissue granulation at next dressing change  Outcome: Progressing  Goal: Participates in plan/prevention/treatment measures  Outcome: Progressing  Goal: Prevent/manage excess moisture  Outcome: Progressing  Goal: Prevent/minimize sheer/friction injuries  Outcome: Progressing  Goal: Promote/optimize nutrition  Outcome: Progressing  Goal: Promote skin healing  Outcome: Progressing

## 2025-04-16 NOTE — ANESTHESIA PREPROCEDURE EVALUATION
Patient: Jeff Tapia    Procedure Information       Anesthesia Start Date/Time: 04/16/25 0817    Procedure: PAROTID GLAND EXCISION (Left)    Location: [facility] OR 04 / [facility] OR    Surgeons: Patrick Jang MD            Relevant Problems   No relevant active problems       Clinical information reviewed:   Tobacco  Allergies  Meds   Med Hx  Surg Hx   Fam Hx  Soc Hx        NPO Detail:  NPO/Void Status  Carbohydrate Drink Given Prior to Surgery? : N  Date of Last Liquid: 04/15/25  Time of Last Liquid: 2300  Date of Last Solid: 04/15/25  Time of Last Solid: 2300  Last Intake Type: Clear fluids  Time of Last Void: 0700         Physical Exam    Airway  Mallampati: III  TM distance: >3 FB  Neck ROM: full     Cardiovascular   Rhythm: regular  Rate: normal     Dental     (+) upper dentures, lower dentures     Pulmonary Breath sounds clear to auscultation     Abdominal            Anesthesia Plan    History of general anesthesia?: yes  History of complications of general anesthesia?: yes    ASA 3     general   (Hx of delayed emergence and post op hypoxia requiring CPAP and inpatient admission.  Plan for extubation straight to CPAP. Discussed with patient and family the chance of not being able to extubate necessitating prolonged intubation.  Patient and family express understanding. )  The patient is a current smoker.  Patient did not smoke on day of procedure.    intravenous induction   Trial extubation is planned.  Anesthetic plan and risks discussed with patient and spouse.    Plan discussed with CAA.

## 2025-04-16 NOTE — OP NOTE
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]     Procedures  PAROTID GLAND EXCISION  74223 - WI EXC PRTD ELISEO/PRTD GLND TOT DSJ&PRSRV FACIAL NR      Surgeons      * Patrick Jang - Primary    Resident/Fellow/Other Assistant:  Surgeons and Role:     * Lonny Burt MD - Resident - Assisting     * Shekhar Green MD - Fellow    Staff:   Circulator: Rainer  Scrub Person: Margarita Paul Scrub: Rachael    Anesthesia Staff: Anesthesiologist: Scarlet Brown MD  CRNA: KEVIN Mary-CRNA  C-AA: VICKI Medina  Anesthesia Resident: Radha Hopkins MD; Ana Mcgee MD    Procedure Summary  Anesthesia: General  ASA: III  Estimated Blood Loss: 20mL  Intra-op Medications:   Administrations occurring from 0745 to 1320 on 25:   Medication Name Total Dose   lidocaine-epinephrine (Xylocaine W/EPI) 1 %-1:100,000 injection 10 mL   sodium chloride 0.9 % irrigation solution 1,000 mL   bacitracin ointment 1 Application   albumin human 5 % 250 mL   ceFAZolin (Ancef) 1 gram/ 3 mL injection - reconstituted 330 mg/mL 2 g   dexAMETHasone (Decadron) injection 4 mg/mL 10 mg   fentaNYL (Sublimaze) injection 50 mcg/mL 100 mcg   glycopyrrolate (Robinul) injection 0.2 mg   lactated Ringer's infusion Cannot be calculated   LR infusion Cannot be calculated   lidocaine (cardiac) injection 2% prefilled syringe 60 mg   ondansetron 2 mg/mL 4 mg   phenylephrine (Kristofer-Synephrine) 10 mg/250 mL NS (40 mcg/mL) infusion 4.8 mg   phenylephrine (Kristofer-Synephrine) injection 320 mcg   propofol (Diprivan) injection 10 mg/mL 200 mg   remifentanil (Ultiva) 1,000 mcg in sodium chloride 0.9% 50 mL (20 mcg/mL) infusion 1.96 mg   succinylcholine (Anectine) 20 mg/mL injection 120 mg               Anesthesia Record               Intraprocedure I/O Totals          Intake    Remifentanil Drip 0.00 mL    The total shown is the total volume documented since Anesthesia Start was filed.    Phenylephrine Drip 0.00 mL    The total shown is the total volume documented since Anesthesia Start was filed.    Total Intake 0 mL          Specimen:   ID Type Source Tests Collected by Time   1 : PAROTIDECTOMY Tissue PAROTID RESECTION LEFT SURGICAL PATHOLOGY EXAM Patrick Jang MD 4/16/2025 0913   2 : TISSUE ADJACENT TO SKIN Tissue SOFT TISSUE MASS BIOPSY SURGICAL PATHOLOGY EXAM Patrick Jang MD 4/16/2025 0923   3 : PAROTID MASS Tissue PAROTID RESECTION LEFT SURGICAL PATHOLOGY EXAM Patrick Jang MD 4/16/2025 1157   4 : ADDITIONAL PAROTID TISSUE Tissue PAROTID RESECTION LEFT SURGICAL PATHOLOGY EXAM Patrick Jang MD 4/16/2025 1157        Drains and/or Catheters:   Closed/Suction Drain 1 Anterior;Inferior;Left Bulb 19 Fr. (Active)     Findings:   Large palpable mass of the left parotid gland extending from the second medical arch down to the parotid tail.  On resection it was found to be involving the superficial and deep lobes of the parotid gland.   Significantly scarred and fibrotic surgical bed with challenging nerve dissection  Entirety of upper and lower branches identified. Upper division traced to main trunk and stylomastoid foramen. All divisions stimmed at end of case.  Entire tumor removed en bloc with total parotidectomy specimen  2 small separate areas of suspicious tissue sent separate for path.   Closed in layers, 15fr round drain.     Indications: Jeff Tapia is an 74 y.o. 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     The patient was seen in the preoperative area. The risks, benefits, complications, treatment options, non-operative alternatives, expected recovery and outcomes were discussed with the patient. The possibilities of reaction to medication, pulmonary aspiration, injury to  surrounding structures, bleeding, recurrent infection, the need for additional procedures, failure to diagnose a condition, and creating a complication requiring transfusion or operation were discussed with the patient. The patient concurred with the proposed plan, giving informed consent.  The site of surgery was properly noted/marked if necessary per policy. The patient has been actively warmed in preoperative area. Preoperative antibiotics have been ordered and given within 1 hours of incision. Venous thrombosis prophylaxis have been ordered including bilateral sequential compression devices    Procedure Details:   After informed consent was confirmed the patient was brought to the operating room placed supine the operating table.  Surgical timeout was performed and the patient was then handed anesthesia for induction and intubation.  They were easily orally intubated and the tube was secured to the patient's right oral commissure.  The patient was turned 90 degrees for the operative team.  Facial nerve monitor leads were placed in the orbicularis oris and orbicularis oculi and confirmed to be functioning on the nim monitor.  We then marked a left-sided modified Javon incision and inferiorly utilized some of his previous neck incisions.  This is preinjected with 10 cc of 1% lidocaine with epinephrine.  They were then prepped and draped in standard fashion for parotid surgery.    I began by making my incision down through the skin with a #15 blade.  There was significant scarring along the entire length of the incision.  I continued dissecting with a hemostat until i identified the SCM inferiorly and the parotid fascia over the gland.  There was no obvious platysmal layer.  The gland fascia was also quite scarred and adherent to the subcutaneous fat and dermis.  I used Bovie cautery to elevate this flap carefully to avoid damage to the deep facial nerve branches but also to avoid creating a defect in the skin.   Once the skin flap was raised anterior to the mass stays were placed for retraction.    I then incised using Bovie cautery onto the tragal cartilage to try to identify the pretracheal plane and attempt at an anterograde facial nerve dissection.  This plane was quite scarred and did not easily separate as it typically does.  I then incised along the anterior border the SCM in an attempt to find the posterior belly of the gastric muscle but this too was quite scarred.  We felt that a safe anterograde dissection was not in the patient's best interest given the likelihood of significant scarring around the main trunk of the facial nerve.    We then worked inferiorly to identify the distal marginal mandibular nerve branch which was identified but was surrounded in scar.  We again attempted to traced this into the gland to the common trunk but we felt that the traumatic injury to the nerve continuing to do this would be too great.  We therefore worked superiorly and using a checkpoint nerve stimulator identified the upper division of the facial nerve which was identified easily over the masseter.  We traced this into the gland dividing gland as needed and staying away from our mass.  We traced this until all upper division branches were identified including the frontal branch and the midface branches.  We continued dissecting around the mass very carefully and dividing parotid tissue as able where it was safe superiorly.  We then continued to traced the nerve inferiorly again identifying branches and preserving them and carefully dissecting the nerve off of the mass.  At this point we were able to continue to dissect posterior to the mass in the deep parotid  it from its deep attachments as well.  We continue to traced the facial nerve until the pes and then from the pes down to the common trunk of the facial nerve and the stylomastoid foramen.  At this point we were able to completely separate all facial nerve  branches from the mass and we then dissected and cauterized the mass free from all this attachments and where it was removed and the patient.  The nerve was stimulated at 0.5 and all divisions were found to be working well.    Hemostasis was obtained using bipolar cautery and the wound was irrigated.  A 15 Bruneian round CHAUNCEY drain was placed in the resection cavity.  The wound is then closed in layers using 3-0 Vicryl for the deep layer and 5-0 fast running for the skin.    The patient tolerated the procedure well.  They are handed over to anesthesia for awakening and extubation.    Complications:  None; patient tolerated the procedure well.    Disposition: PACU - hemodynamically stable.  Condition: stable     Additional Details: None    Attending Attestation: I was present and scrubbed for the entire procedure.    Patrick Jang  Phone Number: [phone]

## 2025-04-16 NOTE — ANESTHESIA POSTPROCEDURE EVALUATION
Patient: Jeff Tapia    Procedure Summary       Date: 04/16/25 Room / Location: [facility] OR 04 / [facility] OR    Anesthesia Start: 0817 Anesthesia Stop:     Procedure: PAROTID GLAND EXCISION (Left) Diagnosis:       Mass of left parotid gland      (Mass of left parotid gland [K11.8])    Surgeons: Patrick Jang MD Responsible Provider: Scarlet Brown MD    Anesthesia Type: general ASA Status: 3            Anesthesia Type: general    Vitals Value Taken Time   /108 04/16/25 12:47   Temp 37.1 04/16/25 12:52   Pulse 82 04/16/25 12:50   Resp 15 04/16/25 12:50   SpO2 96 % 04/16/25 12:50   Vitals shown include unfiled device data.    Anesthesia Post Evaluation    Patient participation: complete - patient cannot participate  Level of consciousness: awake  Pain score: 1  Pain management: adequate  Airway patency: patent  Cardiovascular status: acceptable  Respiratory status: acceptable  Hydration status: acceptable  Postoperative Nausea and Vomiting: none  Comments: Patient was extubated with L nasal trumpet, no CPAP required.  Initial attempt at nasal trumpet on R side with moderate bleeding, which has subsided.         There were no known notable events for this encounter.

## 2025-04-16 NOTE — CARE PLAN
Problem: Pain - Adult  Goal: Verbalizes/displays adequate comfort level or baseline comfort level  Outcome: Progressing     Problem: Safety - Adult  Goal: Free from fall injury  Outcome: Progressing     Problem: Discharge Planning  Goal: Discharge to home or other facility with appropriate resources  Outcome: Progressing     Problem: Nutrition  Goal: Nutrient intake appropriate for maintaining nutritional needs  Outcome: Progressing     Problem: Pain  Goal: Takes deep breaths with improved pain control throughout the shift  Outcome: Progressing  Goal: Turns in bed with improved pain control throughout the shift  Outcome: Progressing  Goal: Walks with improved pain control throughout the shift  Outcome: Progressing  Goal: Performs ADL's with improved pain control throughout shift  Outcome: Progressing  Goal: Participates in PT with improved pain control throughout the shift  Outcome: Progressing  Goal: Free from opioid side effects throughout the shift  Outcome: Progressing  Goal: Free from acute confusion related to pain meds throughout the shift  Outcome: Progressing     Problem: Respiratory  Goal: Wean oxygen to maintain O2 saturation per order/standard this shift  Outcome: Progressing  Goal: Increase self care and/or family involvement in next 24 hours  Outcome: Progressing     Problem: Skin  Goal: Decreased wound size/increased tissue granulation at next dressing change  Outcome: Progressing  Goal: Participates in plan/prevention/treatment measures  Outcome: Progressing  Goal: Prevent/manage excess moisture  Outcome: Progressing  Goal: Prevent/minimize sheer/friction injuries  Outcome: Progressing  Goal: Promote/optimize nutrition  Outcome: Progressing  Goal: Promote skin healing  Outcome: Progressing   The patient's goals for the shift include      The clinical goals for the shift include rest    Over the shift, the patient did not make progress toward the following goals. Barriers to progression include ***.  Addended by: REBA HENLEY on: 3/7/2025 01:01 PM     Modules accepted: Level of Service     Recommendations to address these barriers include ***.

## 2025-04-16 NOTE — ANESTHESIA PROCEDURE NOTES
Airway  Date/Time: 4/16/2025 8:32 AM  Reason: elective    Airway not difficult    Staffing  Performed: VICKI   Authorized by: Scarlet Brown MD    Performed by: VICKI Medina  Patient location during procedure: OR    Patient Condition  Indications for airway management: anesthesia and airway protection  Sedation level: deep     Final Airway Details   Preoxygenated: yes  Final airway type: endotracheal airway  Successful airway: ETT  Cuffed: yes   Successful intubation technique: video laryngoscopy  Adjuncts used in placement: intubating stylet  Endotracheal tube insertion site: oral  Blade type: glide.  Blade size: #4  ETT size (mm): 7.0  Cormack-Lehane Classification: grade I - full view of glottis  Placement verified by: chest auscultation and capnometry   Measured from: lips  ETT to lips (cm): 24  Number of attempts at approach: 1

## 2025-04-17 VITALS
HEART RATE: 73 BPM | OXYGEN SATURATION: 95 % | SYSTOLIC BLOOD PRESSURE: 157 MMHG | HEIGHT: 73 IN | DIASTOLIC BLOOD PRESSURE: 81 MMHG | BODY MASS INDEX: 32.34 KG/M2 | WEIGHT: 244.05 LBS | RESPIRATION RATE: 18 BRPM | TEMPERATURE: 97.9 F

## 2025-04-17 LAB
GLUCOSE BLD MANUAL STRIP-MCNC: 154 MG/DL (ref 74–99)
GLUCOSE BLD MANUAL STRIP-MCNC: 168 MG/DL (ref 74–99)

## 2025-04-17 PROCEDURE — 2500000004 HC RX 250 GENERAL PHARMACY W/ HCPCS (ALT 636 FOR OP/ED): Performed by: STUDENT IN AN ORGANIZED HEALTH CARE EDUCATION/TRAINING PROGRAM

## 2025-04-17 PROCEDURE — 2500000001 HC RX 250 WO HCPCS SELF ADMINISTERED DRUGS (ALT 637 FOR MEDICARE OP): Performed by: STUDENT IN AN ORGANIZED HEALTH CARE EDUCATION/TRAINING PROGRAM

## 2025-04-17 PROCEDURE — 7100000011 HC EXTENDED STAY RECOVERY HOURLY - NURSING UNIT

## 2025-04-17 PROCEDURE — 99232 SBSQ HOSP IP/OBS MODERATE 35: CPT | Performed by: NURSE PRACTITIONER

## 2025-04-17 PROCEDURE — 2500000005 HC RX 250 GENERAL PHARMACY W/O HCPCS: Performed by: STUDENT IN AN ORGANIZED HEALTH CARE EDUCATION/TRAINING PROGRAM

## 2025-04-17 PROCEDURE — 82947 ASSAY GLUCOSE BLOOD QUANT: CPT

## 2025-04-17 RX ORDER — DOCUSATE SODIUM 100 MG/1
100 CAPSULE, LIQUID FILLED ORAL 2 TIMES DAILY
Qty: 60 CAPSULE | Refills: 0 | Status: SHIPPED | OUTPATIENT
Start: 2025-04-17 | End: 2025-05-17

## 2025-04-17 RX ORDER — ACETAMINOPHEN 500 MG
1000 TABLET ORAL EVERY 8 HOURS PRN
Qty: 90 TABLET | Refills: 0 | Status: SHIPPED | OUTPATIENT
Start: 2025-04-17 | End: 2025-05-02

## 2025-04-17 RX ADMIN — CARBOXYMETHYLCELLULOSE SODIUM 2 DROP: 5 SOLUTION/ DROPS OPHTHALMIC at 08:21

## 2025-04-17 RX ADMIN — METOPROLOL TARTRATE 25 MG: 25 TABLET, FILM COATED ORAL at 08:20

## 2025-04-17 RX ADMIN — GABAPENTIN 300 MG: 300 CAPSULE ORAL at 08:20

## 2025-04-17 RX ADMIN — ISOSORBIDE MONONITRATE 30 MG: 30 TABLET, EXTENDED RELEASE ORAL at 05:53

## 2025-04-17 RX ADMIN — ASPIRIN 81 MG: 81 TABLET, COATED ORAL at 08:21

## 2025-04-17 RX ADMIN — PANTOPRAZOLE SODIUM 40 MG: 40 TABLET, DELAYED RELEASE ORAL at 08:20

## 2025-04-17 RX ADMIN — ACETAMINOPHEN 975 MG: 325 TABLET, FILM COATED ORAL at 14:11

## 2025-04-17 RX ADMIN — ACETAMINOPHEN 975 MG: 325 TABLET, FILM COATED ORAL at 05:53

## 2025-04-17 RX ADMIN — GABAPENTIN 300 MG: 300 CAPSULE ORAL at 14:11

## 2025-04-17 RX ADMIN — POLYETHYLENE GLYCOL 3350 17 G: 17 POWDER, FOR SOLUTION ORAL at 08:21

## 2025-04-17 ASSESSMENT — PAIN SCALES - GENERAL
PAINLEVEL_OUTOF10: 0 - NO PAIN

## 2025-04-17 ASSESSMENT — PAIN - FUNCTIONAL ASSESSMENT
PAIN_FUNCTIONAL_ASSESSMENT: 0-10
PAIN_FUNCTIONAL_ASSESSMENT: 0-10

## 2025-04-17 NOTE — CARE PLAN
The patient's goals for the shift include rest and comfort.     The clinical goals for the shift include Patient will report adequate rest and remain without injury throughout shift.    Over the shift, the patient did not make progress toward the following goals. Barriers to progression include . Recommendations to address these barriers include     Problem: Pain - Adult  Goal: Verbalizes/displays adequate comfort level or baseline comfort level  Outcome: Progressing     Problem: Safety - Adult  Goal: Free from fall injury  Outcome: Progressing     Problem: Discharge Planning  Goal: Discharge to home or other facility with appropriate resources  Outcome: Progressing     Problem: Chronic Conditions and Co-morbidities  Goal: Patient's chronic conditions and co-morbidity symptoms are monitored and maintained or improved  Outcome: Progressing     Problem: Nutrition  Goal: Nutrient intake appropriate for maintaining nutritional needs  Outcome: Progressing     Problem: Pain  Goal: Takes deep breaths with improved pain control throughout the shift  Outcome: Progressing  Goal: Turns in bed with improved pain control throughout the shift  Outcome: Progressing  Goal: Walks with improved pain control throughout the shift  Outcome: Progressing  Goal: Performs ADL's with improved pain control throughout shift  Outcome: Progressing  Goal: Participates in PT with improved pain control throughout the shift  Outcome: Progressing  Goal: Free from opioid side effects throughout the shift  Outcome: Progressing  Goal: Free from acute confusion related to pain meds throughout the shift  Outcome: Progressing     Problem: Respiratory  Goal: Wean oxygen to maintain O2 saturation per order/standard this shift  Outcome: Progressing  Goal: Increase self care and/or family involvement in next 24 hours  Outcome: Progressing     Problem: Skin  Goal: Decreased wound size/increased tissue granulation at next dressing change  Outcome:  Progressing  Goal: Participates in plan/prevention/treatment measures  Outcome: Progressing  Goal: Prevent/manage excess moisture  Outcome: Progressing  Goal: Prevent/minimize sheer/friction injuries  Outcome: Progressing  Goal: Promote/optimize nutrition  Outcome: Progressing  Goal: Promote skin healing  Outcome: Progressing

## 2025-04-17 NOTE — CARE PLAN
The patient's goals for the shift include  discharge home.    The clinical goals for the shift include Patient will report adequate rest and remain without injury throughout shift.    Over the shift, the patient did not make progress toward the following goals. Barriers to progression include recent surgery. Recommendations to address these barriers include meet with ENT team to determine is pt safe to d/c.

## 2025-04-17 NOTE — HOSPITAL COURSE
Jeff Tapia is a 74 y.o. male with Mass of left parotid gland, who presented for left parotidectomy by Dr Jang on 4?16. Patient had an uncomplicated surgical course. Patient recovered in PACU and was transferred to [address] for post-operative care and drain watch.    Patient post-operative course was uncomplicated. Drain output was evaluated on POD1 and was found to be appropriate enough for drain removal prior to discharge. On day of discharge, post-operative pain was well controlled with enteral pain medication, breathing on room air, voiding spontaneously ambulating well, and was tolerating a diet. Follow-up arranged with Dr. Jang for 4/25.

## 2025-04-17 NOTE — PROGRESS NOTES
"  Otolaryngology - Head and Neck Surgery Progess Note         Jeff Tapia is a 74 y.o. male on day 1 of admission presenting with Mass of left parotid gland.     Subjective   No acute events overnight. Patient did well overnight, pain controlled, tolerating diet, and no concerns/issue per nursing.     Objective   Physical Exam  Vitals reviewed in EMR  Gen: NAD, AOx3, resting comfortably in bed  Eyes: EOMI, sclera clear, PERRL  Ears: Normal external ears bilaterally  Nose: No rhinorrhea; anterior nares clear  Oral Cavity: Normal lips, MMM  Head: normocephalic, atraumatic; Left facial nerve with global weakness  Face/Neck: Incision well approximated; c/d/I, soft, no evidence of wound dehiscence, no signs of fluid collection or hematoma  Resp: Breathing comfortably; No stridor  Cards: No clubbing/cyanosis/edema in hands  Gastro: Soft, non-distended,   : Voids  MSK: Moves all extremities  Psych: Appropriate mood and affect  Drains: All drains in place and holding suction with serosanguinous drainage (stripped)    Last Recorded Vitals  Blood pressure 157/81, pulse 73, temperature 36.6 °C (97.9 °F), temperature source Temporal, resp. rate 18, height 1.854 m (6' 1\"), weight 111 kg (244 lb 0.8 oz), SpO2 95%.  Intake/Output last 3 Shifts:  I/O last 3 completed shifts:  In: 1250 (11.3 mL/kg) [P.O.:500; I.V.:750 (6.8 mL/kg)]  Out: 55 (0.5 mL/kg) [Drains:55]  Weight: 110.7 kg     Relevant Results  Results for orders placed or performed during the hospital encounter of 04/16/25 (from the past 24 hours)   POCT GLUCOSE   Result Value Ref Range    POCT Glucose 97 74 - 99 mg/dL   POCT GLUCOSE   Result Value Ref Range    POCT Glucose 222 (H) 74 - 99 mg/dL   POCT GLUCOSE   Result Value Ref Range    POCT Glucose 197 (H) 74 - 99 mg/dL   POCT GLUCOSE   Result Value Ref Range    POCT Glucose 154 (H) 74 - 99 mg/dL        Scheduled medications  Scheduled Medications[1]  Continuous medications  Continuous Medications[2]  PRN " medications  PRN Medications[3]                         Assessment & Plan  Mass of left parotid gland    Assessment:  74 year old male with parotid mass status post Superficial Partotidecomy with drain placement on 4/16/2025 by Dr. Jang.    Active Issues:  Parotid mass  Obesity Class I  COPD  GERD  T2DM (A1c 5.0)  HTN,     Plan:  -Drains: Monitor output  -Analgesia:  Scheduled Acetaminophen, Gabapentin  -FEN:  Adult regular diet; monitor and replete electrolytes as needed  -Pulm: wean oxygen to room air,   -ID: No current intervention  -Cardiac: Vitals Q4hr, home Aspirin, Imdur   -Endo: No current interventions  -GI: Bowel regimen, PPI, and PRN anti-emetic  -: voids  -Steroids/Special: N/A  -Embolic PPx: SQH, SCDs while in bed  -Dispo:  Discharge planning for POD 1 home after drain removal in the afternoon    All plans discussed with attendings after morning rounds.      I spent 35 minutes in the professional and overall care of this patient.      Tiffanie Glover APRN, CNP  Certified Family Nurse Practitioner  Nurse Practitioner III  Department of Otolaryngology: Head & Neck Surgery  Personal Pager [phone]  ENT Team  Head and Neck Phone: [phone]             [1] acetaminophen, 975 mg, oral, q8h MARIAMA   Or  acetaminophen, 1,000 mg, oral, q8h MARIAMA   Or  acetaminophen, 1,000 mg, g-tube, q8h MARIAMA  aspirin, 81 mg, oral, Daily  enoxaparin, 40 mg, subcutaneous, q24h  gabapentin, 300 mg, oral, TID  isosorbide mononitrate ER, 30 mg, oral, Daily  lubricating eye drops, 2 drop, Left Eye, TID  metoprolol tartrate, 25 mg, oral, Daily  pantoprazole, 40 mg, oral, Daily  polyethylene glycol, 17 g, oral, Daily  white petrolatum-mineral oiL, 1 Application, Left Eye, Nightly    [2]    [3] PRN medications: naloxone, ondansetron ODT **OR** ondansetron, oxyCODONE, traMADol

## 2025-04-17 NOTE — NURSING NOTE
Discharge instructions reviewed with pt and wife at this time, all questions/concerns addressed. IVs removed intact, dry sterile dressing placed. Pt discharge off unit via wheelchair.

## 2025-04-17 NOTE — PROGRESS NOTES
Per the medical team, this patient has no anticipated discharge needs; full assessment deferred at this time. Will continue to follow for any home going needs that arise.   Nicole Jackson RN TCC

## 2025-04-17 NOTE — PROGRESS NOTES
"Pharmacy Medication History Review    Jeff Tapia is a 74 y.o. male admitted for Mass of left parotid gland. Pharmacy reviewed the patient's ibjsu-vm-ouhymxejn medications and allergies for accuracy.    Medications ADDED:  None  Medications CHANGED:  Gabapentin to \"BID\" - per patient   Medications REMOVED:   None     The list below reflects the updated PTA list.   Prior to Admission Medications   Prescriptions Last Dose Informant   aspirin 81 mg EC tablet 4/16/2025 Morning Self, Spouse/Significant Other   Sig: Take 1 tablet (81 mg) by mouth once daily.   gabapentin (Neurontin) 300 mg capsule  Self   Sig: Take 1 capsule (300 mg) by mouth 3 times a day for 14 days.   Patient taking differently: Take 1 capsule (300 mg) by mouth 2 times a day.   glipiZIDE (Glucotrol) 5 mg tablet 4/15/2025 Self, Spouse/Significant Other   Sig: Take 1 tablet (5 mg) by mouth 2 times a day.   isosorbide mononitrate ER (Imdur) 30 mg 24 hr tablet 4/16/2025 Morning Self, Spouse/Significant Other   Sig: Take 1 tablet (30 mg) by mouth early in the morning..   metFORMIN (Glucophage) 1,000 mg tablet 4/15/2025 Self, Spouse/Significant Other   Sig: Take 1 tablet (1,000 mg) by mouth 2 times a day.   metoprolol tartrate (Lopressor) 25 mg tablet 4/16/2025 Morning Self, Spouse/Significant Other   Sig: Take 1 tablet (25 mg) by mouth once daily.   omega 3-dha-epa-fish oil 100-400-1,000 mg capsule Past Week Self, Spouse/Significant Other   Sig: Take 1,000 mg by mouth once daily.   pantoprazole (ProtoNix) 40 mg EC tablet 4/16/2025 Morning Self, Spouse/Significant Other   Sig: Take 1 tablet (40 mg) by mouth once daily.      Facility-Administered Medications: None        The list below reflects the updated allergy list. Please review each documented allergy for additional clarification and justification.  Allergies  Reviewed by Maty Azul RN on 4/16/2025   No Known Allergies         Patient declines M2B at discharge.     Sources:   [facility]  Pharmacy dispense " "history  Patient Interview Good historian  Spouse Moderate historian  Chart Review      Zohaib Valdes, PharmD  Transitions of Care Pharmacist  04/17/25     Secure Chat preferred   If no response call [phone] or Vocera \"Med Rec\"    "

## 2025-04-17 NOTE — DISCHARGE SUMMARY
Discharge Diagnosis  Mass of left parotid gland    Issues Requiring Follow-Up  Parotid mass s/p parotidectomy  Obesity Class I  COPD  GERD  T2DM (A1c 5.0)  HTN    Test Results Pending At Discharge  Pending Labs       Order Current Status    Surgical Pathology Exam In process            Hospital Course  Jeff Tapia is a 74 y.o. male with Mass of left parotid gland, who presented for left parotidectomy by Dr Jang on 4?16. Patient had an uncomplicated surgical course. Patient recovered in PACU and was transferred to [address] for post-operative care and drain watch. Postoperatively, patient had some facial nerve weakness on the L side that was expected secondary to the dissection. Patient was started on eye care    Patient post-operative course was uncomplicated. Drain output was evaluated on POD1 and was found to be appropriate enough for drain removal prior to discharge. On day of discharge, post-operative pain was well controlled with enteral pain medication, breathing on room air, voiding spontaneously ambulating well, and was tolerating a diet. Patient was discharged with continued eye care precautions. Follow-up arranged with Dr. Jang for 4/25.     Pertinent Physical Exam At Time of Discharge  Physical Exam  Gen: NAD, AOx3, resting comfortably in bed  Eyes: EOMI, sclera clear, PERRL  Ears: Normal external ears bilaterally  Nose: No rhinorrhea; anterior nares clear  Oral Cavity: Normal lips, MMM  Head: normocephalic, atraumatic; Left facial nerve with global weakness  Face/Neck: Incision well approximated; c/d/I, soft, no evidence of wound dehiscence, no signs of fluid collection or hematoma  Resp: Breathing comfortably; No stridor  Cards: No clubbing/cyanosis/edema in hands  Gastro: Soft, non-distended,   : Voids  MSK: Moves all extremities  Psych: Appropriate mood and affect  Drains: All drains removed    Home Medications     Medication List      START taking these medications     acetaminophen 500 mg  tablet; Commonly known as: Tylenol; Take 2 tablets   (1,000 mg) by mouth every 8 hours if needed for mild pain (1 - 3) for up   to 15 days.   docusate sodium 100 mg capsule; Commonly known as: Colace; Take 1   capsule (100 mg) by mouth 2 times a day.   lubricating eye drops ophthalmic solution; Administer 2 drops into the   left eye 3 times a day.     CHANGE how you take these medications     gabapentin 300 mg capsule; Commonly known as: Neurontin; Take 1 capsule   (300 mg) by mouth 3 times a day for 14 days.; What changed: when to take   this     CONTINUE taking these medications     aspirin 81 mg EC tablet   glipiZIDE 5 mg tablet; Commonly known as: Glucotrol   isosorbide mononitrate ER 30 mg 24 hr tablet; Commonly known as: Imdur   metFORMIN 1,000 mg tablet; Commonly known as: Glucophage   metoprolol tartrate 25 mg tablet; Commonly known as: Lopressor   omega 3-dha-epa-fish oil 100-400-1,000 mg capsule   pantoprazole 40 mg EC tablet; Commonly known as: ProtoNix       Outpatient Follow-Up  Future Appointments   Date Time Provider Department Center   4/25/2025  2:15 PM Patrick Jang MD [id number] Marc Boyd MD

## 2025-04-22 LAB
LABORATORY COMMENT REPORT: NORMAL
Lab: NORMAL
PATH REPORT.FINAL DX SPEC: NORMAL
PATH REPORT.GROSS SPEC: NORMAL
PATH REPORT.RELEVANT HX SPEC: NORMAL
PATH REPORT.TOTAL CANCER: NORMAL

## 2025-04-25 ENCOUNTER — APPOINTMENT (OUTPATIENT)
Dept: OTOLARYNGOLOGY | Facility: CLINIC | Age: 75
End: 2025-04-25
Payer: MEDICARE

## 2025-04-29 ENCOUNTER — APPOINTMENT (OUTPATIENT)
Dept: OTOLARYNGOLOGY | Facility: CLINIC | Age: 75
End: 2025-04-29
Payer: MEDICARE

## 2025-05-27 ENCOUNTER — APPOINTMENT (OUTPATIENT)
Dept: OTOLARYNGOLOGY | Facility: CLINIC | Age: 75
End: 2025-05-27
Payer: MEDICARE

## 2025-06-20 ENCOUNTER — HOSPITAL ENCOUNTER (INPATIENT)
Age: 75
LOS: 4 days | Discharge: HOME | DRG: 178 | End: 2025-06-24
Attending: EMERGENCY MEDICINE | Admitting: INTERNAL MEDICINE
Payer: MEDICARE

## 2025-06-20 ENCOUNTER — APPOINTMENT (OUTPATIENT)
Dept: GENERAL RADIOLOGY | Age: 75
DRG: 178 | End: 2025-06-20
Payer: MEDICARE

## 2025-06-20 ENCOUNTER — APPOINTMENT (OUTPATIENT)
Dept: CT IMAGING | Age: 75
DRG: 178 | End: 2025-06-20
Payer: MEDICARE

## 2025-06-20 DIAGNOSIS — R06.02 SHORTNESS OF BREATH: ICD-10-CM

## 2025-06-20 DIAGNOSIS — J18.9 PNEUMONIA OF RIGHT LOWER LOBE DUE TO INFECTIOUS ORGANISM: Primary | ICD-10-CM

## 2025-06-20 DIAGNOSIS — I20.9 ANGINA PECTORIS: ICD-10-CM

## 2025-06-20 DIAGNOSIS — I50.20 HFREF (HEART FAILURE WITH REDUCED EJECTION FRACTION) (HCC): ICD-10-CM

## 2025-06-20 DIAGNOSIS — I42.9 CARDIOMYOPATHY, UNSPECIFIED TYPE (HCC): ICD-10-CM

## 2025-06-20 LAB
ALLEN TEST: POSITIVE
ANION GAP SERPL CALCULATED.3IONS-SCNC: 19 MMOL/L (ref 7–16)
BNP SERPL-MCNC: 6926 PG/ML (ref 0–450)
BUN SERPL-MCNC: 15 MG/DL (ref 8–23)
CALCIUM SERPL-MCNC: 8.6 MG/DL (ref 8.8–10.2)
CHLORIDE SERPL-SCNC: 98 MMOL/L (ref 98–107)
CHP ED QC CHECK: 194
CO2 SERPL-SCNC: 15 MMOL/L (ref 22–29)
CREAT SERPL-MCNC: 1.4 MG/DL (ref 0.7–1.2)
ERYTHROCYTE [DISTWIDTH] IN BLOOD BY AUTOMATED COUNT: 14.9 % (ref 11.5–15)
FLOW RATE: 15
GFR, ESTIMATED: 55 ML/MIN/1.73M2
GLUCOSE BLD-MCNC: 194 MG/DL (ref 74–99)
GLUCOSE SERPL-MCNC: 132 MG/DL (ref 74–99)
HCT VFR BLD AUTO: 31.5 % (ref 37–54)
HGB BLD-MCNC: 9.9 G/DL (ref 12.5–16.5)
MCH RBC QN AUTO: 25.1 PG (ref 26–35)
MCHC RBC AUTO-ENTMCNC: 31.4 G/DL (ref 32–34.5)
MCV RBC AUTO: 79.7 FL (ref 80–99.9)
NEGATIVE BASE EXCESS, ART: 8 MMOL/L
O2 DELIVERY DEVICE: ABNORMAL
PLATELET # BLD AUTO: 132 K/UL (ref 130–450)
PMV BLD AUTO: 9.9 FL (ref 7–12)
POC HCO3: 16 MMOL/L (ref 22–26)
POC O2 SATURATION: 99.8 % (ref 92–98.5)
POC PCO2: 27.2 MM HG (ref 35–45)
POC PH: 7.38 (ref 7.35–7.45)
POC PO2: 219.2 MM HG (ref 60–80)
POTASSIUM SERPL-SCNC: 4.3 MMOL/L (ref 3.5–5.1)
RBC # BLD AUTO: 3.95 M/UL (ref 3.8–5.8)
SAMPLE SITE: ABNORMAL
SODIUM SERPL-SCNC: 131 MMOL/L (ref 136–145)
TROPONIN I SERPL HS-MCNC: 15 NG/L (ref 0–22)
WBC OTHER # BLD: 10.7 K/UL (ref 4.5–11.5)

## 2025-06-20 PROCEDURE — 6370000000 HC RX 637 (ALT 250 FOR IP)

## 2025-06-20 PROCEDURE — 2580000003 HC RX 258: Performed by: EMERGENCY MEDICINE

## 2025-06-20 PROCEDURE — 80048 BASIC METABOLIC PNL TOTAL CA: CPT

## 2025-06-20 PROCEDURE — 82803 BLOOD GASES ANY COMBINATION: CPT

## 2025-06-20 PROCEDURE — 6370000000 HC RX 637 (ALT 250 FOR IP): Performed by: EMERGENCY MEDICINE

## 2025-06-20 PROCEDURE — 6360000002 HC RX W HCPCS

## 2025-06-20 PROCEDURE — 93005 ELECTROCARDIOGRAM TRACING: CPT | Performed by: EMERGENCY MEDICINE

## 2025-06-20 PROCEDURE — 85027 COMPLETE CBC AUTOMATED: CPT

## 2025-06-20 PROCEDURE — 2500000003 HC RX 250 WO HCPCS: Performed by: EMERGENCY MEDICINE

## 2025-06-20 PROCEDURE — 1200000000 HC SEMI PRIVATE

## 2025-06-20 PROCEDURE — 6360000002 HC RX W HCPCS: Performed by: EMERGENCY MEDICINE

## 2025-06-20 PROCEDURE — 71045 X-RAY EXAM CHEST 1 VIEW: CPT

## 2025-06-20 PROCEDURE — 96374 THER/PROPH/DIAG INJ IV PUSH: CPT

## 2025-06-20 PROCEDURE — 99285 EMERGENCY DEPT VISIT HI MDM: CPT

## 2025-06-20 PROCEDURE — 94640 AIRWAY INHALATION TREATMENT: CPT

## 2025-06-20 PROCEDURE — 6360000004 HC RX CONTRAST MEDICATION: Performed by: RADIOLOGY

## 2025-06-20 PROCEDURE — 83880 ASSAY OF NATRIURETIC PEPTIDE: CPT

## 2025-06-20 PROCEDURE — 71275 CT ANGIOGRAPHY CHEST: CPT

## 2025-06-20 PROCEDURE — 82962 GLUCOSE BLOOD TEST: CPT

## 2025-06-20 PROCEDURE — 84484 ASSAY OF TROPONIN QUANT: CPT

## 2025-06-20 RX ORDER — ASPIRIN 81 MG/1
81 TABLET, CHEWABLE ORAL DAILY
Status: DISCONTINUED | OUTPATIENT
Start: 2025-06-21 | End: 2025-06-24 | Stop reason: HOSPADM

## 2025-06-20 RX ORDER — GLIPIZIDE 5 MG/1
5 TABLET ORAL
Status: DISCONTINUED | OUTPATIENT
Start: 2025-06-21 | End: 2025-06-24 | Stop reason: HOSPADM

## 2025-06-20 RX ORDER — TRAMADOL HYDROCHLORIDE 50 MG/1
50 TABLET ORAL EVERY 6 HOURS PRN
Status: DISCONTINUED | OUTPATIENT
Start: 2025-06-20 | End: 2025-06-24 | Stop reason: HOSPADM

## 2025-06-20 RX ORDER — POTASSIUM CHLORIDE 1500 MG/1
40 TABLET, EXTENDED RELEASE ORAL PRN
Status: DISCONTINUED | OUTPATIENT
Start: 2025-06-20 | End: 2025-06-24 | Stop reason: HOSPADM

## 2025-06-20 RX ORDER — IOPAMIDOL 755 MG/ML
75 INJECTION, SOLUTION INTRAVASCULAR
Status: COMPLETED | OUTPATIENT
Start: 2025-06-20 | End: 2025-06-20

## 2025-06-20 RX ORDER — MECOBALAMIN 5000 MCG
5 TABLET,DISINTEGRATING ORAL NIGHTLY PRN
Status: DISCONTINUED | OUTPATIENT
Start: 2025-06-20 | End: 2025-06-24 | Stop reason: HOSPADM

## 2025-06-20 RX ORDER — INSULIN LISPRO 100 [IU]/ML
0-16 INJECTION, SOLUTION INTRAVENOUS; SUBCUTANEOUS
Status: DISCONTINUED | OUTPATIENT
Start: 2025-06-20 | End: 2025-06-24 | Stop reason: HOSPADM

## 2025-06-20 RX ORDER — PROCHLORPERAZINE EDISYLATE 5 MG/ML
10 INJECTION INTRAMUSCULAR; INTRAVENOUS EVERY 6 HOURS PRN
Status: DISCONTINUED | OUTPATIENT
Start: 2025-06-20 | End: 2025-06-24 | Stop reason: HOSPADM

## 2025-06-20 RX ORDER — GLUCAGON 1 MG/ML
1 KIT INJECTION PRN
Status: DISCONTINUED | OUTPATIENT
Start: 2025-06-20 | End: 2025-06-24 | Stop reason: HOSPADM

## 2025-06-20 RX ORDER — ARFORMOTEROL TARTRATE 15 UG/2ML
15 SOLUTION RESPIRATORY (INHALATION)
Status: DISCONTINUED | OUTPATIENT
Start: 2025-06-20 | End: 2025-06-21

## 2025-06-20 RX ORDER — PANTOPRAZOLE SODIUM 40 MG/1
40 TABLET, DELAYED RELEASE ORAL
Status: DISCONTINUED | OUTPATIENT
Start: 2025-06-21 | End: 2025-06-24 | Stop reason: HOSPADM

## 2025-06-20 RX ORDER — ATORVASTATIN CALCIUM 40 MG/1
40 TABLET, FILM COATED ORAL NIGHTLY
Status: DISCONTINUED | OUTPATIENT
Start: 2025-06-21 | End: 2025-06-24 | Stop reason: HOSPADM

## 2025-06-20 RX ORDER — INSULIN LISPRO 100 [IU]/ML
0-8 INJECTION, SOLUTION INTRAVENOUS; SUBCUTANEOUS
Status: DISCONTINUED | OUTPATIENT
Start: 2025-06-21 | End: 2025-06-21

## 2025-06-20 RX ORDER — HEPARIN SODIUM 5000 [USP'U]/ML
5000 INJECTION, SOLUTION INTRAVENOUS; SUBCUTANEOUS 2 TIMES DAILY
Status: DISCONTINUED | OUTPATIENT
Start: 2025-06-20 | End: 2025-06-24 | Stop reason: HOSPADM

## 2025-06-20 RX ORDER — ISOSORBIDE MONONITRATE 30 MG/1
30 TABLET, EXTENDED RELEASE ORAL DAILY
Status: DISCONTINUED | OUTPATIENT
Start: 2025-06-21 | End: 2025-06-24 | Stop reason: HOSPADM

## 2025-06-20 RX ORDER — DEXTROSE MONOHYDRATE 100 MG/ML
INJECTION, SOLUTION INTRAVENOUS CONTINUOUS PRN
Status: DISCONTINUED | OUTPATIENT
Start: 2025-06-20 | End: 2025-06-24 | Stop reason: HOSPADM

## 2025-06-20 RX ORDER — DOCUSATE SODIUM 100 MG/1
100 CAPSULE, LIQUID FILLED ORAL 2 TIMES DAILY
COMMUNITY
Start: 2025-04-17

## 2025-06-20 RX ORDER — ACETAMINOPHEN 325 MG/1
650 TABLET ORAL EVERY 6 HOURS PRN
Status: DISCONTINUED | OUTPATIENT
Start: 2025-06-20 | End: 2025-06-24 | Stop reason: HOSPADM

## 2025-06-20 RX ORDER — PSEUDOEPHED/ACETAMINOPH/DIPHEN 30MG-500MG
500 TABLET ORAL EVERY 8 HOURS PRN
COMMUNITY
Start: 2025-04-17

## 2025-06-20 RX ORDER — POTASSIUM CHLORIDE 7.45 MG/ML
10 INJECTION INTRAVENOUS PRN
Status: DISCONTINUED | OUTPATIENT
Start: 2025-06-20 | End: 2025-06-24 | Stop reason: HOSPADM

## 2025-06-20 RX ORDER — METOPROLOL TARTRATE 25 MG/1
25 TABLET, FILM COATED ORAL DAILY
Status: DISCONTINUED | OUTPATIENT
Start: 2025-06-21 | End: 2025-06-24

## 2025-06-20 RX ORDER — MAGNESIUM SULFATE IN WATER 40 MG/ML
2000 INJECTION, SOLUTION INTRAVENOUS PRN
Status: DISCONTINUED | OUTPATIENT
Start: 2025-06-20 | End: 2025-06-24 | Stop reason: HOSPADM

## 2025-06-20 RX ORDER — IPRATROPIUM BROMIDE AND ALBUTEROL SULFATE 2.5; .5 MG/3ML; MG/3ML
1 SOLUTION RESPIRATORY (INHALATION) CONTINUOUS
Status: DISCONTINUED | OUTPATIENT
Start: 2025-06-20 | End: 2025-06-20

## 2025-06-20 RX ADMIN — AZITHROMYCIN MONOHYDRATE 500 MG: 500 INJECTION, POWDER, LYOPHILIZED, FOR SOLUTION INTRAVENOUS at 21:19

## 2025-06-20 RX ADMIN — WATER 2000 MG: 1 INJECTION INTRAMUSCULAR; INTRAVENOUS; SUBCUTANEOUS at 21:14

## 2025-06-20 RX ADMIN — IOPAMIDOL 75 ML: 755 INJECTION, SOLUTION INTRAVENOUS at 19:19

## 2025-06-20 RX ADMIN — INSULIN LISPRO 4 UNITS: 100 INJECTION, SOLUTION INTRAVENOUS; SUBCUTANEOUS at 21:25

## 2025-06-20 RX ADMIN — IPRATROPIUM BROMIDE AND ALBUTEROL SULFATE 1 DOSE: .5; 2.5 SOLUTION RESPIRATORY (INHALATION) at 17:56

## 2025-06-20 RX ADMIN — METHYLPREDNISOLONE SODIUM SUCCINATE 125 MG: 125 INJECTION INTRAMUSCULAR; INTRAVENOUS at 18:07

## 2025-06-20 RX ADMIN — ARFORMOTEROL TARTRATE 15 MCG: 15 SOLUTION RESPIRATORY (INHALATION) at 21:09

## 2025-06-20 NOTE — ED PROVIDER NOTES
Patient presents emergency department with concern for shortness of breath.  EMS was called as patient was becoming more somnolent.  Patient and his significant other state has been ongoing and worsening over the past 3 days.  He does have history of COPD and is on inhalers.  He is not on oxygen at home and was satting in the upper 80s for EMS.  He responded well with oxygen.  They did not provide him any other medications.  Patient states that he has a productive cough but no fevers or chills.  Patient is denying chest pain, headache, diaphoresis or nausea.    The history is provided by the patient.       Review of Systems   Constitutional:  Positive for activity change and fatigue. Negative for appetite change, chills, diaphoresis and fever.   HENT: Negative.     Eyes: Negative.    Respiratory:  Positive for cough, chest tightness and shortness of breath. Negative for wheezing.    Cardiovascular:  Negative for chest pain, palpitations and leg swelling.   Gastrointestinal:  Negative for abdominal pain, diarrhea, nausea and vomiting.   Genitourinary: Negative.    Musculoskeletal: Negative.    Skin: Negative.    Neurological: Negative.    Psychiatric/Behavioral:  Positive for confusion and decreased concentration.        Physical Exam  Vitals and nursing note reviewed.   Constitutional:       General: He is in acute distress.      Appearance: He is well-developed. He is obese. He is not ill-appearing, toxic-appearing or diaphoretic.   HENT:      Head: Normocephalic and atraumatic.      Mouth/Throat:      Mouth: Mucous membranes are moist.      Pharynx: Oropharynx is clear.   Eyes:      Pupils: Pupils are equal, round, and reactive to light.   Cardiovascular:      Rate and Rhythm: Normal rate and regular rhythm.      Heart sounds: Normal heart sounds. No murmur heard.  Pulmonary:      Effort: Tachypnea, accessory muscle usage and respiratory distress present.      Breath sounds: Examination of the right-lower field

## 2025-06-21 LAB
25(OH)D3 SERPL-MCNC: 20.3 NG/ML (ref 30–100)
ALBUMIN SERPL-MCNC: 3.8 G/DL (ref 3.5–5.2)
ALP SERPL-CCNC: 57 U/L (ref 40–129)
ALT SERPL-CCNC: 9 U/L (ref 0–50)
ANION GAP SERPL CALCULATED.3IONS-SCNC: 18 MMOL/L (ref 7–16)
AST SERPL-CCNC: 16 U/L (ref 0–50)
B PARAP IS1001 DNA NPH QL NAA+NON-PROBE: NOT DETECTED
B PERT DNA SPEC QL NAA+PROBE: NOT DETECTED
B-OH-BUTYR SERPL-MCNC: 0.54 MMOL/L (ref 0.02–0.27)
BASOPHILS # BLD: 0 K/UL (ref 0–0.2)
BASOPHILS NFR BLD: 0 % (ref 0–2)
BILIRUB SERPL-MCNC: 0.5 MG/DL (ref 0–1.2)
BUN SERPL-MCNC: 17 MG/DL (ref 8–23)
C PNEUM DNA NPH QL NAA+NON-PROBE: NOT DETECTED
CALCIUM SERPL-MCNC: 8.8 MG/DL (ref 8.8–10.2)
CHLORIDE SERPL-SCNC: 99 MMOL/L (ref 98–107)
CHOLEST SERPL-MCNC: 112 MG/DL
CO2 SERPL-SCNC: 14 MMOL/L (ref 22–29)
CREAT SERPL-MCNC: 1.2 MG/DL (ref 0.7–1.2)
EKG ATRIAL RATE: 79 BPM
EKG P AXIS: 24 DEGREES
EKG P-R INTERVAL: 176 MS
EKG Q-T INTERVAL: 404 MS
EKG QRS DURATION: 104 MS
EKG QTC CALCULATION (BAZETT): 463 MS
EKG R AXIS: -11 DEGREES
EKG T AXIS: 50 DEGREES
EKG VENTRICULAR RATE: 79 BPM
EOSINOPHIL # BLD: 0.35 K/UL (ref 0.05–0.5)
EOSINOPHILS RELATIVE PERCENT: 4 % (ref 0–6)
ERYTHROCYTE [DISTWIDTH] IN BLOOD BY AUTOMATED COUNT: 14.9 % (ref 11.5–15)
FLUAV RNA NPH QL NAA+NON-PROBE: NOT DETECTED
FLUBV RNA NPH QL NAA+NON-PROBE: NOT DETECTED
FOLATE SERPL-MCNC: 4.2 NG/ML (ref 4.6–34.8)
GFR, ESTIMATED: 61 ML/MIN/1.73M2
GLUCOSE BLD-MCNC: 165 MG/DL (ref 74–99)
GLUCOSE BLD-MCNC: 182 MG/DL (ref 74–99)
GLUCOSE BLD-MCNC: 221 MG/DL (ref 74–99)
GLUCOSE BLD-MCNC: 240 MG/DL (ref 74–99)
GLUCOSE SERPL-MCNC: 209 MG/DL (ref 74–99)
HADV DNA NPH QL NAA+NON-PROBE: NOT DETECTED
HCOV 229E RNA NPH QL NAA+NON-PROBE: NOT DETECTED
HCOV HKU1 RNA NPH QL NAA+NON-PROBE: NOT DETECTED
HCOV NL63 RNA NPH QL NAA+NON-PROBE: NOT DETECTED
HCOV OC43 RNA NPH QL NAA+NON-PROBE: NOT DETECTED
HCT VFR BLD AUTO: 31 % (ref 37–54)
HDLC SERPL-MCNC: 30 MG/DL
HGB BLD-MCNC: 9.8 G/DL (ref 12.5–16.5)
HMPV RNA NPH QL NAA+NON-PROBE: NOT DETECTED
HPIV1 RNA NPH QL NAA+NON-PROBE: NOT DETECTED
HPIV2 RNA NPH QL NAA+NON-PROBE: NOT DETECTED
HPIV3 RNA NPH QL NAA+NON-PROBE: NOT DETECTED
HPIV4 RNA NPH QL NAA+NON-PROBE: NOT DETECTED
IRON SATN MFR SERPL: 6 % (ref 20–55)
IRON SERPL-MCNC: 23 UG/DL (ref 61–157)
LACTATE BLDV-SCNC: 3 MMOL/L (ref 0.5–2.2)
LACTATE BLDV-SCNC: 4.6 MMOL/L (ref 0.5–2.2)
LDLC SERPL CALC-MCNC: 60 MG/DL
LEFT VENTRICULAR EJECTION FRACTION HIGH VALUE: 40 %
LEFT VENTRICULAR EJECTION FRACTION MODE: NORMAL
LV EF: 35 %
LYMPHOCYTES NFR BLD: 0.26 K/UL (ref 1.5–4)
LYMPHOCYTES RELATIVE PERCENT: 3 % (ref 20–42)
M PNEUMO DNA NPH QL NAA+NON-PROBE: NOT DETECTED
MAGNESIUM SERPL-MCNC: 1.5 MG/DL (ref 1.6–2.4)
MCH RBC QN AUTO: 25.3 PG (ref 26–35)
MCHC RBC AUTO-ENTMCNC: 31.6 G/DL (ref 32–34.5)
MCV RBC AUTO: 79.9 FL (ref 80–99.9)
MICROORGANISM/AGENT SPEC: ABNORMAL
MONOCYTES NFR BLD: 0.18 K/UL (ref 0.1–0.95)
MONOCYTES NFR BLD: 2 % (ref 2–12)
NEGATIVE BASE EXCESS, ART: 3.2 MMOL/L
NEUTROPHILS NFR BLD: 92 % (ref 43–80)
NEUTS SEG NFR BLD: 9.11 K/UL (ref 1.8–7.3)
O2 DELIVERY DEVICE: ABNORMAL
PHOSPHATE SERPL-MCNC: 2 MG/DL (ref 2.5–4.5)
PLATELET # BLD AUTO: 139 K/UL (ref 130–450)
PMV BLD AUTO: 10.7 FL (ref 7–12)
POC HCO3: 19.8 MMOL/L (ref 22–26)
POC O2 SATURATION: 95.5 % (ref 92–98.5)
POC PCO2: 27.6 MM HG (ref 35–45)
POC PH: 7.46 (ref 7.35–7.45)
POC PO2: 72.1 MM HG (ref 60–80)
POTASSIUM SERPL-SCNC: 4.3 MMOL/L (ref 3.5–5.1)
PROCALCITONIN SERPL-MCNC: 0.27 NG/ML (ref 0–0.08)
PROT SERPL-MCNC: 6.3 G/DL (ref 6.4–8.3)
RBC # BLD AUTO: 3.88 M/UL (ref 3.8–5.8)
RBC # BLD: ABNORMAL 10*6/UL
RBC # BLD: ABNORMAL 10*6/UL
RSV RNA NPH QL NAA+NON-PROBE: NOT DETECTED
RV+EV RNA NPH QL NAA+NON-PROBE: NOT DETECTED
SARS-COV-2 RNA NPH QL NAA+NON-PROBE: NOT DETECTED
SODIUM SERPL-SCNC: 132 MMOL/L (ref 136–145)
SPECIMEN DESCRIPTION: ABNORMAL
SPECIMEN DESCRIPTION: NORMAL
T4 FREE SERPL-MCNC: 0.9 NG/DL (ref 0.9–1.7)
TIBC SERPL-MCNC: 374 UG/DL (ref 250–450)
TRIGL SERPL-MCNC: 110 MG/DL
TROPONIN I SERPL HS-MCNC: 12 NG/L (ref 0–22)
TSH SERPL DL<=0.05 MIU/L-ACNC: 0.68 UIU/ML (ref 0.27–4.2)
VIT B12 SERPL-MCNC: <150 PG/ML (ref 232–1245)
VLDLC SERPL CALC-MCNC: 22 MG/DL
WBC OTHER # BLD: 9.9 K/UL (ref 4.5–11.5)

## 2025-06-21 PROCEDURE — 99407 BEHAV CHNG SMOKING > 10 MIN: CPT | Performed by: INTERNAL MEDICINE

## 2025-06-21 PROCEDURE — 2500000003 HC RX 250 WO HCPCS

## 2025-06-21 PROCEDURE — 99223 1ST HOSP IP/OBS HIGH 75: CPT | Performed by: INTERNAL MEDICINE

## 2025-06-21 PROCEDURE — 6360000002 HC RX W HCPCS

## 2025-06-21 PROCEDURE — 82306 VITAMIN D 25 HYDROXY: CPT

## 2025-06-21 PROCEDURE — 82010 KETONE BODYS QUAN: CPT

## 2025-06-21 PROCEDURE — 6360000002 HC RX W HCPCS: Performed by: INTERNAL MEDICINE

## 2025-06-21 PROCEDURE — 84484 ASSAY OF TROPONIN QUANT: CPT

## 2025-06-21 PROCEDURE — 80061 LIPID PANEL: CPT

## 2025-06-21 PROCEDURE — 2580000003 HC RX 258

## 2025-06-21 PROCEDURE — 82607 VITAMIN B-12: CPT

## 2025-06-21 PROCEDURE — 87081 CULTURE SCREEN ONLY: CPT

## 2025-06-21 PROCEDURE — 83550 IRON BINDING TEST: CPT

## 2025-06-21 PROCEDURE — 87205 SMEAR GRAM STAIN: CPT

## 2025-06-21 PROCEDURE — 85025 COMPLETE CBC W/AUTO DIFF WBC: CPT

## 2025-06-21 PROCEDURE — 93010 ELECTROCARDIOGRAM REPORT: CPT | Performed by: INTERNAL MEDICINE

## 2025-06-21 PROCEDURE — 83735 ASSAY OF MAGNESIUM: CPT

## 2025-06-21 PROCEDURE — 93005 ELECTROCARDIOGRAM TRACING: CPT

## 2025-06-21 PROCEDURE — 1200000000 HC SEMI PRIVATE

## 2025-06-21 PROCEDURE — 82803 BLOOD GASES ANY COMBINATION: CPT

## 2025-06-21 PROCEDURE — 36415 COLL VENOUS BLD VENIPUNCTURE: CPT

## 2025-06-21 PROCEDURE — 6370000000 HC RX 637 (ALT 250 FOR IP)

## 2025-06-21 PROCEDURE — 87899 AGENT NOS ASSAY W/OPTIC: CPT

## 2025-06-21 PROCEDURE — 82962 GLUCOSE BLOOD TEST: CPT

## 2025-06-21 PROCEDURE — 84100 ASSAY OF PHOSPHORUS: CPT

## 2025-06-21 PROCEDURE — 83540 ASSAY OF IRON: CPT

## 2025-06-21 PROCEDURE — 0202U NFCT DS 22 TRGT SARS-COV-2: CPT

## 2025-06-21 PROCEDURE — 84145 PROCALCITONIN (PCT): CPT

## 2025-06-21 PROCEDURE — 83605 ASSAY OF LACTIC ACID: CPT

## 2025-06-21 PROCEDURE — 94640 AIRWAY INHALATION TREATMENT: CPT

## 2025-06-21 PROCEDURE — 36600 WITHDRAWAL OF ARTERIAL BLOOD: CPT

## 2025-06-21 PROCEDURE — 87449 NOS EACH ORGANISM AG IA: CPT

## 2025-06-21 PROCEDURE — 82746 ASSAY OF FOLIC ACID SERUM: CPT

## 2025-06-21 PROCEDURE — 87070 CULTURE OTHR SPECIMN AEROBIC: CPT

## 2025-06-21 PROCEDURE — 80053 COMPREHEN METABOLIC PANEL: CPT

## 2025-06-21 PROCEDURE — 84439 ASSAY OF FREE THYROXINE: CPT

## 2025-06-21 PROCEDURE — 84443 ASSAY THYROID STIM HORMONE: CPT

## 2025-06-21 PROCEDURE — 94660 CPAP INITIATION&MGMT: CPT

## 2025-06-21 PROCEDURE — 2500000003 HC RX 250 WO HCPCS: Performed by: INTERNAL MEDICINE

## 2025-06-21 RX ORDER — NICOTINE 21 MG/24HR
1 PATCH, TRANSDERMAL 24 HOURS TRANSDERMAL DAILY
Status: DISCONTINUED | OUTPATIENT
Start: 2025-06-21 | End: 2025-06-24 | Stop reason: HOSPADM

## 2025-06-21 RX ORDER — BUDESONIDE 0.5 MG/2ML
0.5 INHALANT ORAL
Status: DISCONTINUED | OUTPATIENT
Start: 2025-06-21 | End: 2025-06-24 | Stop reason: HOSPADM

## 2025-06-21 RX ORDER — IPRATROPIUM BROMIDE AND ALBUTEROL SULFATE 2.5; .5 MG/3ML; MG/3ML
1 SOLUTION RESPIRATORY (INHALATION)
Status: DISCONTINUED | OUTPATIENT
Start: 2025-06-21 | End: 2025-06-24 | Stop reason: HOSPADM

## 2025-06-21 RX ADMIN — Medication: at 23:03

## 2025-06-21 RX ADMIN — ATORVASTATIN CALCIUM 40 MG: 40 TABLET, FILM COATED ORAL at 20:06

## 2025-06-21 RX ADMIN — HEPARIN SODIUM 5000 UNITS: 5000 INJECTION INTRAVENOUS; SUBCUTANEOUS at 09:04

## 2025-06-21 RX ADMIN — ISOSORBIDE MONONITRATE 30 MG: 30 TABLET, EXTENDED RELEASE ORAL at 09:04

## 2025-06-21 RX ADMIN — ARFORMOTEROL TARTRATE 15 MCG: 15 SOLUTION RESPIRATORY (INHALATION) at 06:29

## 2025-06-21 RX ADMIN — GLIPIZIDE 5 MG: 5 TABLET ORAL at 09:04

## 2025-06-21 RX ADMIN — METHYLPREDNISOLONE SODIUM SUCCINATE 60 MG: 125 INJECTION INTRAMUSCULAR; INTRAVENOUS at 05:34

## 2025-06-21 RX ADMIN — IPRATROPIUM BROMIDE AND ALBUTEROL SULFATE 1 DOSE: .5; 2.5 SOLUTION RESPIRATORY (INHALATION) at 18:05

## 2025-06-21 RX ADMIN — CEFTRIAXONE SODIUM 1000 MG: 1 INJECTION, POWDER, FOR SOLUTION INTRAMUSCULAR; INTRAVENOUS at 20:06

## 2025-06-21 RX ADMIN — METHYLPREDNISOLONE SODIUM SUCCINATE 40 MG: 40 INJECTION, POWDER, LYOPHILIZED, FOR SOLUTION INTRAMUSCULAR; INTRAVENOUS at 17:48

## 2025-06-21 RX ADMIN — INSULIN LISPRO 4 UNITS: 100 INJECTION, SOLUTION INTRAVENOUS; SUBCUTANEOUS at 09:42

## 2025-06-21 RX ADMIN — ASPIRIN 81 MG: 81 TABLET, CHEWABLE ORAL at 09:04

## 2025-06-21 RX ADMIN — INSULIN LISPRO 4 UNITS: 100 INJECTION, SOLUTION INTRAVENOUS; SUBCUTANEOUS at 12:44

## 2025-06-21 RX ADMIN — BUDESONIDE 500 MCG: 0.5 SUSPENSION RESPIRATORY (INHALATION) at 18:05

## 2025-06-21 RX ADMIN — HEPARIN SODIUM 5000 UNITS: 5000 INJECTION INTRAVENOUS; SUBCUTANEOUS at 20:07

## 2025-06-21 RX ADMIN — IPRATROPIUM BROMIDE AND ALBUTEROL SULFATE 1 DOSE: .5; 2.5 SOLUTION RESPIRATORY (INHALATION) at 14:07

## 2025-06-21 RX ADMIN — PANTOPRAZOLE SODIUM 40 MG: 40 TABLET, DELAYED RELEASE ORAL at 05:34

## 2025-06-21 RX ADMIN — AZITHROMYCIN MONOHYDRATE 500 MG: 500 INJECTION, POWDER, LYOPHILIZED, FOR SOLUTION INTRAVENOUS at 20:30

## 2025-06-21 RX ADMIN — MAGNESIUM SULFATE HEPTAHYDRATE 2000 MG: 40 INJECTION, SOLUTION INTRAVENOUS at 06:49

## 2025-06-21 RX ADMIN — HEPARIN SODIUM 5000 UNITS: 5000 INJECTION INTRAVENOUS; SUBCUTANEOUS at 00:22

## 2025-06-21 RX ADMIN — INSULIN LISPRO 4 UNITS: 100 INJECTION, SOLUTION INTRAVENOUS; SUBCUTANEOUS at 20:16

## 2025-06-21 RX ADMIN — METOPROLOL TARTRATE 25 MG: 25 TABLET, FILM COATED ORAL at 09:04

## 2025-06-21 RX ADMIN — Medication: at 10:50

## 2025-06-21 NOTE — CONSULTS
Assessment and Plan  Patient is a 74 y.o. male with the following medical Problems:   Right lower lobe pneumonia  COPD with acute exacerbation  Nicotine dependence.  Morbid obesity  Obstructive sleep apnea.  Acute kidney injury CKD stage    Plan of care:  Continue with ceftriaxone and azithromycin for community-acquired pneumonia.  Scheduled DuoNeb, Pulmicort, and Solu-Medrol.  Pneumonia workup including Legionella urinary antigen, strep urinary antigen, mycoplasma IgM, respiratory viral panel procalcitonin level, sputum culture and MRSA swab.  Repeat lactic acid  Continue with maintenance IV fluid  DVT prophylaxis  Smoking cessation was strongly encouraged.  Alternative methods for smoking cessation were explained.  Nicotine patch was ordered ( 11 minutes)  BiPAP while sleeping, napping, and as needed    History of Present Illness:   Patient is a 74-year-old man with above-mentioned medical problems who presented to the emergency room with progressive shortness of breath, dizziness, and cough.  Patient endorses low-grade fever.  He denies chest pain.  CT scan of the chest showed.  CT scan of the chest was personally reviewed and independently interpreted    Past Medical History:  Past Medical History:   Diagnosis Date    CAD (coronary artery disease)     has had 2 cardiac caths   states has small blockages    Community acquired pneumonia of right lower lobe of lung 6/20/2025    COPD (chronic obstructive pulmonary disease) (HCC)     Diabetes mellitus (HCC)     Gastric ulcer     surgery in 1983    Hypertension     Sleep apnea     doesnt use his cpap      Past Surgical History:   Procedure Laterality Date    APPENDECTOMY      CATARACT REMOVAL Left 08/15/2017    left eye cataract extraction iw ioc    CATARACT REMOVAL WITH IMPLANT Right 09/18/2018    COLONOSCOPY      ENDOSCOPY, COLON, DIAGNOSTIC      FRACTURE SURGERY      jaw    OTHER SURGICAL HISTORY      gastric ulcer surgery    CO XCAPSL CTRC RMVL INSJ IO LENS  Received from [facility]    Humiliation, Afraid, Rape, and Kick questionnaire     Fear of Current or Ex-Partner: No     Emotionally Abused: No     Physically Abused: No     Sexually Abused: No   Housing Stability: Low Risk  (6/20/2025)    Housing Stability Vital Sign     Unable to Pay for Housing in the Last Year: No     Number of Times Moved in the Last Year: 1     Homeless in the Last Year: No       Current Medications:     Current Facility-Administered Medications:     sodium bicarbonate 150 mEq in dextrose 5 % 1,000 mL infusion, , IntraVENous, Continuous, Jj Benitez APRN - CNP, Last Rate: 83 mL/hr at 06/21/25 1050, New Bag at 06/21/25 1050    budesonide (PULMICORT) nebulizer suspension 500 mcg, 0.5 mg, Nebulization, BID RT, Jj Benitez APRN - CNP    ipratropium 0.5 mg-albuterol 2.5 mg (DUONEB) nebulizer solution 1 Dose, 1 Dose, Inhalation, 4x Daily RT, Jj Benitez APRN - CNP    pantoprazole (PROTONIX) tablet 40 mg, 40 mg, Oral, QAM AC, Nu Denton APRN - CNP, 40 mg at 06/21/25 0534    acetaminophen (TYLENOL) tablet 650 mg, 650 mg, Oral, Q6H PRN, Nu Denton APRN - CNP    prochlorperazine (COMPAZINE) injection 10 mg, 10 mg, IntraVENous, Q6H PRN, Nu Denton APRN - CNP    melatonin disintegrating tablet 5 mg, 5 mg, Oral, Nightly PRN, Nu Denton APRN - CNP    methylPREDNISolone sodium succ (SOLU-MEDROL) 60 mg in sterile water 0.96 mL injection, 60 mg, IntraVENous, Q12H, Nu Denton APRN - CNP, 60 mg at 06/21/25 0534    heparin (porcine) injection 5,000 Units, 5,000 Units, SubCUTAneous, BID, Nu Denton APRN - CNP, 5,000 Units at 06/21/25 0904    insulin lispro (HUMALOG,ADMELOG) injection vial 0-16 Units, 0-16 Units, SubCUTAneous, 4x Daily AC & HS, Nu Denton APRN - CNP, 4 Units at 06/21/25 1244    azithromycin (ZITHROMAX) 500 mg in sodium chloride 0.9 % 250 mL IVPB ([id number]), 500 mg, IntraVENous, Q24H, Nu Denton, APRN - CNP    cefTRIAXone

## 2025-06-21 NOTE — CONSULTS
History:  Social History     Socioeconomic History    Marital status:      Spouse name: Not on file    Number of children: Not on file    Years of education: Not on file    Highest education level: Not on file   Occupational History    Not on file   Tobacco Use    Smoking status: Every Day     Current packs/day: 0.50     Average packs/day: 0.5 packs/day for 50.0 years (25.0 ttl pk-yrs)     Types: Cigarettes    Smokeless tobacco: Never    Tobacco comments:     currently 0.5 ppd, 1/7/21   Vaping Use    Vaping status: Never Used   Substance and Sexual Activity    Alcohol use: Not Currently     Comment: no alcohol since  March 2014    Drug use: Never    Sexual activity: Never   Other Topics Concern    Not on file   Social History Narrative    Drinks 3 cups of coffee daily.      Social Drivers of Health     Financial Resource Strain: Low Risk  (4/16/2025)    Received from [facility]    Overall Financial Resource Strain (CARDIA)     Difficulty of Paying Living Expenses: Not very hard   Food Insecurity: Food Insecurity Present (6/20/2025)    Hunger Vital Sign     Worried About Running Out of Food in the Last Year: Never true     Ran Out of Food in the Last Year: Sometimes true   Transportation Needs: No Transportation Needs (6/20/2025)    PRAPARE - Transportation     Lack of Transportation (Medical): No     Lack of Transportation (Non-Medical): No   Physical Activity: Not on file   Stress: Not on file   Social Connections: Not on file   Intimate Partner Violence: Not At Risk (4/16/2025)    Received from [facility]    Humiliation, Afraid, Rape, and Kick questionnaire     Fear of Current or Ex-Partner: No     Emotionally Abused: No     Physically Abused: No     Sexually Abused: No   Housing Stability: Low Risk  (6/20/2025)    Housing Stability Vital Sign     Unable to Pay for Housing in the Last Year: No     Number of Times Moved in the Last Year: 1     Homeless in the  Last Year: No       Allergies:  No Known Allergies    Home Medications:  Prior to Admission medications    Medication Sig Start Date End Date Taking? Authorizing Provider   docusate sodium (COLACE) 100 MG capsule Take 1 capsule by mouth 2 times daily 4/17/25  Yes Ruben Arana MD   Acetaminophen Extra Strength 500 MG TABS Take 1 tablet by mouth every 8 hours as needed (for mild pain (1-3)) 4/17/25  Yes Ruben Arana MD   traMADol (ULTRAM) 50 MG tablet Take 1 tablet by mouth every 6 hours as needed for Pain.   Yes Ruben Arana MD   ibuprofen (ADVIL;MOTRIN) 600 MG tablet Take 1 tablet by mouth every 6 hours as needed for Pain   Yes Ruben Arana MD   atorvastatin (LIPITOR) 40 MG tablet Take 1 tablet by mouth nightly 8/1/23  Yes Deng Infante DO   pantoprazole (PROTONIX) 40 MG tablet Take 1 tablet by mouth daily   Yes Ruben Arana MD   glipiZIDE (GLUCOTROL) 5 MG tablet Take 1 tablet by mouth 2 times daily (before meals). 4/13/14  Yes Amanda Garrison DO, FACOI   metFORMIN (GLUCOPHAGE) 1000 MG tablet Take 1 tablet by mouth 2 times daily (with meals). 4/13/14  Yes Amanda Garrison DO, FACOI   isosorbide mononitrate (IMDUR) 30 MG CR tablet Take 1 tablet by mouth daily   Yes Ruben Arana MD   aspirin 81 MG tablet Take 1 tablet by mouth daily   Yes Ruben Arana MD   metoprolol (LOPRESSOR) 25 MG tablet Take 1 tablet by mouth daily   Yes Ruben Arana MD   Omega 3 1000 MG CAPS Take 1,000 mg by mouth daily.   Yes Ruben Arana MD   gabapentin (NEURONTIN) 300 MG capsule Take 1 capsule by mouth 3 times daily.    Ruben Arana MD   fluticasone (FLONASE) 50 MCG/ACT nasal spray 2 sprays by Each Nostril route daily as needed for Allergies    Ruben Arana MD       Current Medications:  Current Facility-Administered Medications   Medication Dose Route Frequency Provider Last Rate Last Admin    sodium bicarbonate 150 mEq in dextrose 5 %

## 2025-06-21 NOTE — PLAN OF CARE
Problem: Chronic Conditions and Co-morbidities  Goal: Patient's chronic conditions and co-morbidity symptoms are monitored and maintained or improved  6/21/2025 0935 by Rohini Pickering, RN  Outcome: Progressing     Problem: Safety - Adult  Goal: Free from fall injury  6/21/2025 0935 by Rohini Pickering, RN  Outcome: Progressing

## 2025-06-21 NOTE — PROGRESS NOTES
4 Eyes Skin Assessment     NAME:  Eric Zarco  YOB: 1950  MEDICAL RECORD NUMBER:  [id number]    The patient is being assessed for  Admission    I agree that at least one RN has performed a thorough Head to Toe Skin Assessment on the patient. ALL assessment sites listed below have been assessed.      Areas assessed by both nurses:    Head, Face, Ears, Shoulders, Back, Chest, Arms, Elbows, Hands, Sacrum. Buttock, Coccyx, Ischium, Legs. Feet and Heels, and Under Medical Devices         Does the Patient have a Wound? No noted wound(s)       Audie Prevention initiated by RN: No  Wound Care Orders initiated by RN: No    Pressure Injury (Stage 3,4, Unstageable, DTI, NWPT, and Complex wounds) if present, place Wound referral order by RN under : No    New Ostomies, if present place, Ostomy referral order under : No     Nurse 1 eSignature: Electronically signed by Imani Mckinney RN on 6/20/25 at 11:50 PM EDT    **SHARE this note so that the co-signing nurse can place an eSignature**    Nurse 2 eSignature: Electronically signed by Sagrario Marquez RN on 6/20/25 at 11:51 PM EDT

## 2025-06-21 NOTE — H&P
Internal Medicine Consult Note    [facility]    Eric Raza D.O., PACOOAudreyI.                    Deng Infante D.O., PACOOAudreyI.                             Quinton Epps D.O.     Virgil Ceballos, MSN, APRN-CNP  Jj Benitez, MSN, APRN-CNP  Miesha Arce, MSN, APRN-CNP  Nu Denton, MSN, APRN-CNP     Department of Internal Medicine  History and Physical    PCP: Dr. Potocki   Admitting Physician: Dr. Raza  Consultants:       CHIEF COMPLAINT:  shortness of breath     HISTORY OF PRESENT ILLNESS:    Patient presents to the emergency room due to fatigue, shortness of breath despite home inhaler use, productive cough with thick white sputum, hypoxia on room air per EMS. Does not wear home oxygen.He endorses intermittent episodes of left rib cage pain/flank pain, eases with walking, worsens as a shooting pain when getting up from a seated position, severity 6/10. He was having conversational dyspnea and using accessory muscles to breath on arrival but states he is feeling better after receiving treatment in the ER. He denies any allergies, medications reviewed with nurse. There are no family members present, all questions answered at this time. Patient does not use nicotine, alcohol, marijuana, or illicit drugs. Lives at home with his wife, independent with ADLs, no home health services. He is agreeable to admission for treatment of pneumonia .    PAST MEDICAL Hx:  Past Medical History:   Diagnosis Date    CAD (coronary artery disease)     has had 2 cardiac caths   states has small blockages    COPD (chronic obstructive pulmonary disease) (HCC)     Diabetes mellitus (HCC)     Gastric ulcer     surgery in 1983    Hypertension     Sleep apnea     doesnt use his cpap       PAST SURGICAL Hx:   Past Surgical History:   Procedure Laterality Date    APPENDECTOMY      CATARACT REMOVAL Left 08/15/2017    left eye cataract extraction [facility] [place]    CATARACT REMOVAL WITH IMPLANT Right 09/18/2018     moist mucus membranes.  Tonsils without erythema or exudates.    NECK:    Supple, symmetrical, trachea midline, no adenopathy, thyroid symmetric, not enlarged and no tenderness, skin normal, no bruits, no JVD    HEMATOLOGIC/LYMPHATICS:    No cervical lymphadenopathy and no supraclavicular lymphadenopathy    LUNGS:    Symmetric. No increased work of breathing, good air exchange, rhonchi Rside and diminished left side to auscultation bilaterally, no wheezes, rhonchi, or rales, on room air.     CARDIOVASCULAR:    Normal apical impulse, regular rate and rhythm with occasional PVCs, normal S1 and S2 no S3 or S4, and no murmur noted    ABDOMEN:    Normal bowel sounds, soft, non-distended, non-tender, no masses palpated, no hepatosplenomegaly, no rebound or guarding elicited on palpation     MUSCULOSKELETAL:    There is no redness, warmth, or swelling of the joints.  Full range of motion noted.  Motor strength is 5 out of 5 all extremities bilaterally.  Tone is normal.    NEUROLOGIC:    Awake, alert, oriented to name, place and time.  Cranial nerves II-XII are grossly intact.  Motor is 5 out of 5 bilaterally.      SKIN:    No bruising or bleeding.  No redness, warmth, or swelling    EXTREMITIES:    Peripheral pulses present.  No edema, cyanosis, or swelling.    LABS::  Lab Results   Component Value Date    WBC 10.7 06/20/2025    HGB 9.9 (L) 06/20/2025    HCT 31.5 (L) 06/20/2025     06/20/2025     (L) 06/20/2025    K 4.3 06/20/2025    CL 98 06/20/2025    CREATININE 1.4 (H) 06/20/2025    BUN 15 06/20/2025    CO2 15 (L) 06/20/2025    GLUCOSE 132 (H) 06/20/2025    ALT 20 08/01/2023    AST 24 08/01/2023    INR 1.3 01/23/2025    APTT 19.5 (L) 04/11/2014     Lab Results   Component Value Date    INR 1.3 01/23/2025    INR 1.1 07/30/2023    INR 1.3 04/11/2014    PROTIME 14.2 (H) 01/23/2025    PROTIME 12.6 (H) 07/30/2023    PROTIME 13.3 (H) 04/11/2014      Lab Results   Component Value Date    TSH 1.93 07/30/2023

## 2025-06-21 NOTE — PROGRESS NOTES
Comprehensive Nutrition Assessment    Type and Reason for Visit:  Initial, Positive nutrition screen (MST 2)    Nutrition Recommendations/Plan:   Continue Current Diet: Regular (75gm CHO per meal)  Start Oral Nutrition Supplement: Glucerna (diabetic) BID.     Malnutrition Assessment:  Malnutrition Status:  At risk for malnutrition (06/21/25 1652)    Context:  Acute Illness     Findings of the 6 clinical characteristics of malnutrition:  Energy Intake:  Mild decrease in energy intake  Weight Loss:  No weight loss     Body Fat Loss:  No body fat loss     Muscle Mass Loss:  No muscle mass loss    Fluid Accumulation:  Unable to assess (d/t multifactorial)     Strength:  Not Performed    Nutrition Assessment:    Pt admit w/ PNA. PMHx: HTN, COPD, CAD, DM, TIA, syncope, parotid mass & abscess, gastric ulcer, ADEEL w/ CPAP non compliance, nicotine dependence. Pt reports a poor appetite for the last 5 days. Reports minimal intake of 2-3 bites per meal. Pt open to Glucerna BID. Will order, encourage intake & continue inpatient monitoring.    Nutrition Related Findings:    A&Ox4, +BS, Abd soft, round,. NT, Last BM 6/17/25. I/Os WDL, BLE trace edema, Na 132, folate 4.2, Mg 1.5, Bglu 132-221, phos 2.0, B-12 <150, HDL 30. Wound Type: None (no significant wound)       Current Nutrition Intake & Therapies:    Average Meal Intake: 1-25%  Average Supplements Intake: None Ordered  ADULT DIET; Regular; 5 carb choices (75 gm/meal)  ADULT ORAL NUTRITION SUPPLEMENT; Breakfast, Dinner; Diabetic Oral Supplement    Anthropometric Measures:  Height: 185.4 cm (6' 1\")  Ideal Body Weight (IBW): 184 lbs (84 kg)    Admission Body Weight: 110.2 kg (242 lb 14 oz) (6/21/25 bed scale)  Current Body Weight: 110.2 kg (242 lb 14 oz) (6/21/25 bed scale), 132 % IBW. Weight Source: Bed scale  Current BMI (kg/m2): 32.1  Usual Body Weight: 109 kg (240 lb 5 oz) (1/23/25 not specified)  % Weight Change (Calculated): 1.1  Weight Adjustment For: No

## 2025-06-21 NOTE — PROGRESS NOTES
Spiritual Health History and Assessment/Progress Note  [facility]    Initial Encounter, Spiritual/Emotional Needs,  ,  ,      Name: Eric Zarco MRN: [id number]    Age: 74 y.o.     Sex: male   Language: English   Baptism: None   Community acquired pneumonia of right lower lobe of lung     Date: 6/21/2025                           Spiritual Assessment began in [facility] MED SURG TELE        Referral/Consult From: Rounding   Encounter Overview/Reason: Initial Encounter, Spiritual/Emotional Needs  Service Provided For: Patient    Brittany, Belief, Meaning:   Patient has beliefs or practices that help with coping during difficult times  Family/Friends No family/friends present      Importance and Influence:  Patient has spiritual/personal beliefs that influence decisions regarding their health  Family/Friends No family/friends present    Community:  Patient feels well-supported. Support system includes: Spouse/Partner  Family/Friends No family/friends present    Assessment and Plan of Care:     Patient Interventions include: Facilitated expression of thoughts and feelings  Family/Friends Interventions include: No family/friends present    Patient Plan of Care: Spiritual Care available upon further referral  Family/Friends Plan of Care: No family/friends present    Electronically signed by Chaplain Best on 6/21/2025 at 1:39 PM

## 2025-06-22 ENCOUNTER — APPOINTMENT (OUTPATIENT)
Dept: GENERAL RADIOLOGY | Age: 75
DRG: 178 | End: 2025-06-22
Payer: MEDICARE

## 2025-06-22 LAB
ALBUMIN SERPL-MCNC: 3.5 G/DL (ref 3.5–5.2)
ALP SERPL-CCNC: 52 U/L (ref 40–129)
ALT SERPL-CCNC: 9 U/L (ref 0–50)
ANION GAP SERPL CALCULATED.3IONS-SCNC: 11 MMOL/L (ref 7–16)
AST SERPL-CCNC: 13 U/L (ref 0–50)
BASOPHILS # BLD: 0 K/UL (ref 0–0.2)
BASOPHILS NFR BLD: 0 % (ref 0–2)
BILIRUB SERPL-MCNC: 0.2 MG/DL (ref 0–1.2)
BUN SERPL-MCNC: 26 MG/DL (ref 8–23)
CALCIUM SERPL-MCNC: 8.5 MG/DL (ref 8.8–10.2)
CHLORIDE SERPL-SCNC: 97 MMOL/L (ref 98–107)
CO2 SERPL-SCNC: 25 MMOL/L (ref 22–29)
CREAT SERPL-MCNC: 1.3 MG/DL (ref 0.7–1.2)
EKG ATRIAL RATE: 88 BPM
EKG P AXIS: 23 DEGREES
EKG P-R INTERVAL: 178 MS
EKG Q-T INTERVAL: 382 MS
EKG QRS DURATION: 106 MS
EKG QTC CALCULATION (BAZETT): 462 MS
EKG R AXIS: -4 DEGREES
EKG T AXIS: 71 DEGREES
EKG VENTRICULAR RATE: 88 BPM
EOSINOPHIL # BLD: 0 K/UL (ref 0.05–0.5)
EOSINOPHILS RELATIVE PERCENT: 0 % (ref 0–6)
ERYTHROCYTE [DISTWIDTH] IN BLOOD BY AUTOMATED COUNT: 15 % (ref 11.5–15)
GFR, ESTIMATED: 56 ML/MIN/1.73M2
GLUCOSE BLD-MCNC: 166 MG/DL (ref 74–99)
GLUCOSE BLD-MCNC: 227 MG/DL (ref 74–99)
GLUCOSE BLD-MCNC: 233 MG/DL (ref 74–99)
GLUCOSE BLD-MCNC: 237 MG/DL (ref 74–99)
GLUCOSE SERPL-MCNC: 210 MG/DL (ref 74–99)
HCT VFR BLD AUTO: 29.2 % (ref 37–54)
HGB BLD-MCNC: 9.4 G/DL (ref 12.5–16.5)
IMM GRANULOCYTES # BLD AUTO: 0.06 K/UL (ref 0–0.58)
IMM GRANULOCYTES NFR BLD: 1 % (ref 0–5)
L PNEUMO1 AG UR QL IA.RAPID: POSITIVE
LACTATE BLDV-SCNC: 2.4 MMOL/L (ref 0.5–2.2)
LYMPHOCYTES NFR BLD: 0.67 K/UL (ref 1.5–4)
LYMPHOCYTES RELATIVE PERCENT: 8 % (ref 20–42)
MAGNESIUM SERPL-MCNC: 2.2 MG/DL (ref 1.6–2.4)
MCH RBC QN AUTO: 25.4 PG (ref 26–35)
MCHC RBC AUTO-ENTMCNC: 32.2 G/DL (ref 32–34.5)
MCV RBC AUTO: 78.9 FL (ref 80–99.9)
MONOCYTES NFR BLD: 0.51 K/UL (ref 0.1–0.95)
MONOCYTES NFR BLD: 6 % (ref 2–12)
NEUTROPHILS NFR BLD: 84 % (ref 43–80)
NEUTS SEG NFR BLD: 6.73 K/UL (ref 1.8–7.3)
PHOSPHATE SERPL-MCNC: 2.4 MG/DL (ref 2.5–4.5)
PLATELET # BLD AUTO: 172 K/UL (ref 130–450)
PMV BLD AUTO: 10.9 FL (ref 7–12)
POTASSIUM SERPL-SCNC: 4.2 MMOL/L (ref 3.5–5.1)
PROT SERPL-MCNC: 6 G/DL (ref 6.4–8.3)
RBC # BLD AUTO: 3.7 M/UL (ref 3.8–5.8)
S PNEUM AG SPEC QL: NEGATIVE
SODIUM SERPL-SCNC: 133 MMOL/L (ref 136–145)
SPECIMEN SOURCE: NORMAL
WBC OTHER # BLD: 8 K/UL (ref 4.5–11.5)

## 2025-06-22 PROCEDURE — 6370000000 HC RX 637 (ALT 250 FOR IP)

## 2025-06-22 PROCEDURE — 82962 GLUCOSE BLOOD TEST: CPT

## 2025-06-22 PROCEDURE — 6360000002 HC RX W HCPCS

## 2025-06-22 PROCEDURE — 86738 MYCOPLASMA ANTIBODY: CPT

## 2025-06-22 PROCEDURE — 71046 X-RAY EXAM CHEST 2 VIEWS: CPT

## 2025-06-22 PROCEDURE — 1200000000 HC SEMI PRIVATE

## 2025-06-22 PROCEDURE — 36415 COLL VENOUS BLD VENIPUNCTURE: CPT

## 2025-06-22 PROCEDURE — 80053 COMPREHEN METABOLIC PANEL: CPT

## 2025-06-22 PROCEDURE — 2700000000 HC OXYGEN THERAPY PER DAY

## 2025-06-22 PROCEDURE — 6370000000 HC RX 637 (ALT 250 FOR IP): Performed by: INTERNAL MEDICINE

## 2025-06-22 PROCEDURE — 94640 AIRWAY INHALATION TREATMENT: CPT

## 2025-06-22 PROCEDURE — 94660 CPAP INITIATION&MGMT: CPT

## 2025-06-22 PROCEDURE — 99233 SBSQ HOSP IP/OBS HIGH 50: CPT | Performed by: INTERNAL MEDICINE

## 2025-06-22 PROCEDURE — 83605 ASSAY OF LACTIC ACID: CPT

## 2025-06-22 PROCEDURE — 2500000003 HC RX 250 WO HCPCS

## 2025-06-22 PROCEDURE — 2500000003 HC RX 250 WO HCPCS: Performed by: INTERNAL MEDICINE

## 2025-06-22 PROCEDURE — 6360000002 HC RX W HCPCS: Performed by: INTERNAL MEDICINE

## 2025-06-22 PROCEDURE — 85025 COMPLETE CBC W/AUTO DIFF WBC: CPT

## 2025-06-22 PROCEDURE — 84100 ASSAY OF PHOSPHORUS: CPT

## 2025-06-22 PROCEDURE — 2580000003 HC RX 258

## 2025-06-22 PROCEDURE — 83735 ASSAY OF MAGNESIUM: CPT

## 2025-06-22 RX ORDER — LEVOFLOXACIN 750 MG/1
750 TABLET, FILM COATED ORAL EVERY 24 HOURS
Status: DISCONTINUED | OUTPATIENT
Start: 2025-06-22 | End: 2025-06-23

## 2025-06-22 RX ADMIN — ASPIRIN 81 MG: 81 TABLET, CHEWABLE ORAL at 08:35

## 2025-06-22 RX ADMIN — INSULIN LISPRO 4 UNITS: 100 INJECTION, SOLUTION INTRAVENOUS; SUBCUTANEOUS at 08:34

## 2025-06-22 RX ADMIN — INSULIN LISPRO 4 UNITS: 100 INJECTION, SOLUTION INTRAVENOUS; SUBCUTANEOUS at 16:54

## 2025-06-22 RX ADMIN — SODIUM PHOSPHATE, MONOBASIC, MONOHYDRATE AND SODIUM PHOSPHATE, DIBASIC, ANHYDROUS 15 MMOL: 142; 276 INJECTION, SOLUTION INTRAVENOUS at 08:45

## 2025-06-22 RX ADMIN — BUDESONIDE 500 MCG: 0.5 SUSPENSION RESPIRATORY (INHALATION) at 06:03

## 2025-06-22 RX ADMIN — ATORVASTATIN CALCIUM 40 MG: 40 TABLET, FILM COATED ORAL at 20:29

## 2025-06-22 RX ADMIN — HEPARIN SODIUM 5000 UNITS: 5000 INJECTION INTRAVENOUS; SUBCUTANEOUS at 20:29

## 2025-06-22 RX ADMIN — PANTOPRAZOLE SODIUM 40 MG: 40 TABLET, DELAYED RELEASE ORAL at 06:10

## 2025-06-22 RX ADMIN — INSULIN LISPRO 4 UNITS: 100 INJECTION, SOLUTION INTRAVENOUS; SUBCUTANEOUS at 20:33

## 2025-06-22 RX ADMIN — IPRATROPIUM BROMIDE AND ALBUTEROL SULFATE 1 DOSE: .5; 2.5 SOLUTION RESPIRATORY (INHALATION) at 13:19

## 2025-06-22 RX ADMIN — ISOSORBIDE MONONITRATE 30 MG: 30 TABLET, EXTENDED RELEASE ORAL at 08:35

## 2025-06-22 RX ADMIN — METOPROLOL TARTRATE 25 MG: 25 TABLET, FILM COATED ORAL at 08:35

## 2025-06-22 RX ADMIN — HEPARIN SODIUM 5000 UNITS: 5000 INJECTION INTRAVENOUS; SUBCUTANEOUS at 08:35

## 2025-06-22 RX ADMIN — BUDESONIDE 500 MCG: 0.5 SUSPENSION RESPIRATORY (INHALATION) at 17:06

## 2025-06-22 RX ADMIN — IPRATROPIUM BROMIDE AND ALBUTEROL SULFATE 1 DOSE: .5; 2.5 SOLUTION RESPIRATORY (INHALATION) at 06:03

## 2025-06-22 RX ADMIN — METHYLPREDNISOLONE SODIUM SUCCINATE 40 MG: 40 INJECTION, POWDER, LYOPHILIZED, FOR SOLUTION INTRAMUSCULAR; INTRAVENOUS at 17:40

## 2025-06-22 RX ADMIN — LEVOFLOXACIN 750 MG: 750 TABLET, FILM COATED ORAL at 08:35

## 2025-06-22 RX ADMIN — IPRATROPIUM BROMIDE AND ALBUTEROL SULFATE 1 DOSE: .5; 2.5 SOLUTION RESPIRATORY (INHALATION) at 17:06

## 2025-06-22 RX ADMIN — METHYLPREDNISOLONE SODIUM SUCCINATE 40 MG: 40 INJECTION, POWDER, LYOPHILIZED, FOR SOLUTION INTRAMUSCULAR; INTRAVENOUS at 06:10

## 2025-06-22 NOTE — PROGRESS NOTES
Assessment and Plan  Patient is a 74 y.o. male with the following medical Problems:   Right lower lobe pneumonia due to Legionella pneumonia  COPD with acute exacerbation  Nicotine dependence.  Morbid obesity  Obstructive sleep apnea.  Acute kidney injury CKD stage    Plan of care:  Continue with ceftriaxone and azithromycin for community-acquired pneumonia.  Urine Legionella antigen is positive.  Mycoplasma IgM was reordered  Scheduled DuoNeb, Pulmicort, and Solu-Medrol.  Pneumonia workup was positive for Legionella urinary antigen.   Repeat lactic acid improved to 3 from 4.6.  Continue with maintenance IV fluid  DVT prophylaxis  Smoking cessation was strongly encouraged.  Alternative methods for smoking cessation were explained.  Nicotine patch was ordered ( 11 minutes)  BiPAP while sleeping, napping, and as needed    History of Present Illness:   Patient is a 74-year-old man with above-mentioned medical problems who presented to the emergency room with progressive shortness of breath, dizziness, and cough.  Patient endorses low-grade fever.  He denies chest pain.  CT scan of the chest showed.  CT scan of the chest was personally reviewed and independently interpreted.    Daily progress:  June 22, 2025: Patient continues to improve from respiratory point of view.  He has no fever, chills, rigors.  He remains on ceftriaxone and azithromycin.  Urine Legionella antigen came back positive.  Chest images were personally reviewed and independently interpreted and showed right lower lobe opacity.  Patient continues to be on room air.  Creatinine continues to fluctuate indicating CKD.  Plan of care will be discussed with primary team.  Repeated lactic was ordered for today.    Past Medical History:  Past Medical History:   Diagnosis Date    CAD (coronary artery disease)     has had 2 cardiac caths   states has small blockages    Community acquired pneumonia of right lower lobe of lung 6/20/2025    COPD (chronic

## 2025-06-22 NOTE — PROGRESS NOTES
INPATIENT CARDIOLOGY CONSULT    Name: Eric Zarco    Age: 74 y.o.    Date of Admission: 2025  5:14 PM    Date of Service: 2025    Reason for Consultation:   Chief Complaint   Patient presents with    Shortness of Breath     Hx of COPD, has been getting progressively worse          Referring Physician: Eric Raza DO    History of Present Illness: 74-year-old male with below medical history who is hospitalized for shortness of breath, dyspnea on exertion, cough with thick whitish productive mucus and echo to be hypoxic on room air at the emergency room.  Patient is currently being treated for pneumonia and cardiology is consulted.  Apparently patient reports he has known history of coronary artery disease with prior cath showing mild blockages according to patient      Interim:  Reports breathing is improving       Past Medical History:  Past Medical History:   Diagnosis Date    CAD (coronary artery disease)     has had 2 cardiac caths   states has small blockages    Community acquired pneumonia of right lower lobe of lung 2025    COPD (chronic obstructive pulmonary disease) (HCC)     Diabetes mellitus (HCC)     Gastric ulcer     surgery in     Hypertension     Sleep apnea     doesnt use his cpap       Past Surgical History:  Past Surgical History:   Procedure Laterality Date    APPENDECTOMY      CATARACT REMOVAL Left 08/15/2017    left eye cataract extraction iw ioc    CATARACT REMOVAL WITH IMPLANT Right 2018    COLONOSCOPY      ENDOSCOPY, COLON, DIAGNOSTIC      FRACTURE SURGERY      jaw    OTHER SURGICAL HISTORY      gastric ulcer surgery    GA XCAPSL CTRC RMVL INSJ IO LENS PROSTH W/O ECP Right 2018    CATARACT EXTRACTION WITH IOL RIGHT EYE performed by Jj PAULINO MD at [facility] OR       Family History:  Family History   Problem Relation Age of Onset    Cancer Mother         leukemia    Diabetes Father     Heart Attack Father 63             Heart Attack

## 2025-06-22 NOTE — PROGRESS NOTES
Internal Medicine Consult Note    [facility]    Eric Raza D.O., BALAI.                    Deng Infante D.O., ANGELINA Epps D.O.     Virgil Ceballos, MSN, APRN-CNP  Jj Benitez, MSN, APRN-CNP  Miesha Arce, MSN APRN-CNP  Nu Denton, MSN. APRN-NP-C     Primary Care Physician: Potocki, Joseph A, DO   Admitting Physician:  Eric Raza DO  Admission date and time: 6/20/2025  5:14 PM    Room:  [address] [place] [address]  Admitting diagnosis: Pneumonia of right lower lobe due to infectious organism [J18.9]  Community acquired pneumonia of right lower lobe of lung [J18.9]    Patient Name: Eric Zarco  MRN: [id number]    Date of Service: 6/22/2025     Subjective:  Eric is a 74 y.o. male who was seen and examined today,6/22/2025, at the bedside.  Patient seems to be improving.  Evidence of metabolic lactic acid still present.  Patient test positive for the Legionella.  Seems to be breathing easier    No family present during my examination.    Review of System:   Constitutional:   Denies fever or chills, weight loss or gain, reports fatigue and generalized malaise  HEENT:   Denies ear pain, sore throat, sinus or eye problems.  Cardiovascular:   Denies any chest pain, irregular heartbeats, or palpitations.   Respiratory:   Denies sputum production, hemoptysis, or wheezing.  Reports dyspnea with activity, nonproductive cough  Gastrointestinal:   Denies nausea, vomiting, diarrhea, or constipation.  Denies any abdominal pain.  Genitourinary:    Denies any urgency, frequency, hematuria. Voiding  without difficulty.  Extremities:   Denies lower extremity swelling, edema or cyanosis.   Neurology:    Denies any headache or focal neurological deficits, Denies generalized weakness or memory difficulty.   Psch:   Denies being anxious or depressed.  Musculoskeletal:    Denies  myalgias, joint complaints or back pain.   Integumentary:   Denies any rashes, ulcers,

## 2025-06-22 NOTE — PLAN OF CARE
Problem: Chronic Conditions and Co-morbidities  Goal: Patient's chronic conditions and co-morbidity symptoms are monitored and maintained or improved  6/22/2025 1028 by Sheri Jones RN  Outcome: Progressing  6/21/2025 2126 by Janel Foster RN  Outcome: Progressing     Problem: Safety - Adult  Goal: Free from fall injury  6/22/2025 1028 by Sheri Jones RN  Outcome: Progressing  6/21/2025 2126 by Janel Foster RN  Outcome: Progressing     Problem: Nutrition Deficit:  Goal: Optimize nutritional status  6/22/2025 1028 by Sheri Jones RN  Outcome: Progressing  6/21/2025 2126 by Janel Foster RN  Outcome: Progressing

## 2025-06-22 NOTE — PROGRESS NOTES
Internal Medicine Consult Note    [facility]    Eric Raza D.O., MINNIE.                    Deng Infante D.O., ANGELINA Epps D.O.     Virgil Ceballos, MSN, APRN-CNP  Jj Benitez, MSN, APRN-CNP  Miesha Arce, MSN APRN-CNP  Nu Denton, MSN. APRN-NP-C     Primary Care Physician: Potocki, Joseph A, DO   Admitting Physician:  rEic Raza DO  Admission date and time: 6/20/2025  5:14 PM    Room:  [address] [place] [address]  Admitting diagnosis: Pneumonia of right lower lobe due to infectious organism [J18.9]  Community acquired pneumonia of right lower lobe of lung [J18.9]    Patient Name: Eric Zarco  MRN: [id number]    Date of Service: 6/21/2025     Subjective:  Eric is a 74 y.o. male who was seen and examined today,6/21/2025, at the bedside.  Patient is resting comfortably in bed.  He appears very fatigued.  Patient states he just does not feel well but slightly better since admission.  He has been afebrile and remains on room air.    No family present during my examination.    Review of System:   Constitutional:   Denies fever or chills, weight loss or gain, reports fatigue and generalized malaise  HEENT:   Denies ear pain, sore throat, sinus or eye problems.  Cardiovascular:   Denies any chest pain, irregular heartbeats, or palpitations.   Respiratory:   Denies sputum production, hemoptysis, or wheezing.  Reports dyspnea with activity, nonproductive cough  Gastrointestinal:   Denies nausea, vomiting, diarrhea, or constipation.  Denies any abdominal pain.  Genitourinary:    Denies any urgency, frequency, hematuria. Voiding  without difficulty.  Extremities:   Denies lower extremity swelling, edema or cyanosis.   Neurology:    Denies any headache or focal neurological deficits, Denies generalized weakness or memory difficulty.   Psch:   Denies being anxious or depressed.  Musculoskeletal:    Denies  myalgias, joint complaints or back pain.  prophylaxis  Appreciate subspecialist input    Continue current therapy.  See orders for further plan of care.    More than 50% of my  time was spent at the bedside counseling/coordinating care with the patient and/or family with face to face contact.  This time was spent reviewing notes and laboratory data as well as instructing and counseling the patient. Time I spent with the family or surrogate(s) is included only if the patient was incapable of providing the necessary information or participating in medical decisions. I also discussed the differential diagnosis and all of the proposed management plans with the patient and individuals accompanying the patient.    The patient was seen, examined and then discussed with Dr. Raza.      YRIS Onofre CNP  6/21/2025  8:12 PM        I saw and evaluated the patient. I agree with the findings and the plan of care as documented in Jj ARNDT-CNP note.    Eric Raza DO, FACOI  8:18 PM  6/21/2025

## 2025-06-22 NOTE — PLAN OF CARE
Problem: Chronic Conditions and Co-morbidities  Goal: Patient's chronic conditions and co-morbidity symptoms are monitored and maintained or improved  6/21/2025 2126 by Janel Foster RN  Outcome: Progressing  6/21/2025 0935 by Rohini Pickering RN  Outcome: Progressing     Problem: Safety - Adult  Goal: Free from fall injury  6/21/2025 2126 by Janel Foster RN  Outcome: Progressing  6/21/2025 0935 by Rohini Pickering RN  Outcome: Progressing     Problem: Nutrition Deficit:  Goal: Optimize nutritional status  Outcome: Progressing

## 2025-06-23 ENCOUNTER — APPOINTMENT (OUTPATIENT)
Age: 75
DRG: 178 | End: 2025-06-23
Attending: INTERNAL MEDICINE
Payer: MEDICARE

## 2025-06-23 ENCOUNTER — APPOINTMENT (OUTPATIENT)
Dept: NUCLEAR MEDICINE | Age: 75
DRG: 178 | End: 2025-06-23
Payer: MEDICARE

## 2025-06-23 ENCOUNTER — HOSPITAL ENCOUNTER (INPATIENT)
Age: 75
Discharge: HOME | DRG: 178 | End: 2025-06-25
Payer: MEDICARE

## 2025-06-23 LAB
ALBUMIN SERPL-MCNC: 3.5 G/DL (ref 3.5–5.2)
ALP SERPL-CCNC: 55 U/L (ref 40–129)
ALT SERPL-CCNC: 14 U/L (ref 0–50)
ANION GAP SERPL CALCULATED.3IONS-SCNC: 12 MMOL/L (ref 7–16)
AST SERPL-CCNC: 19 U/L (ref 0–50)
BASOPHILS # BLD: 0 K/UL (ref 0–0.2)
BASOPHILS NFR BLD: 0 % (ref 0–2)
BILIRUB SERPL-MCNC: 0.3 MG/DL (ref 0–1.2)
BUN SERPL-MCNC: 26 MG/DL (ref 8–23)
CALCIUM SERPL-MCNC: 8.5 MG/DL (ref 8.8–10.2)
CHLORIDE SERPL-SCNC: 98 MMOL/L (ref 98–107)
CO2 SERPL-SCNC: 25 MMOL/L (ref 22–29)
CREAT SERPL-MCNC: 1.4 MG/DL (ref 0.7–1.2)
EOSINOPHIL # BLD: 0 K/UL (ref 0.05–0.5)
EOSINOPHILS RELATIVE PERCENT: 0 % (ref 0–6)
ERYTHROCYTE [DISTWIDTH] IN BLOOD BY AUTOMATED COUNT: 15 % (ref 11.5–15)
GFR, ESTIMATED: 52 ML/MIN/1.73M2
GLUCOSE BLD-MCNC: 215 MG/DL (ref 74–99)
GLUCOSE BLD-MCNC: 230 MG/DL (ref 74–99)
GLUCOSE BLD-MCNC: 230 MG/DL (ref 74–99)
GLUCOSE BLD-MCNC: 231 MG/DL (ref 74–99)
GLUCOSE SERPL-MCNC: 203 MG/DL (ref 74–99)
HCT VFR BLD AUTO: 29.9 % (ref 37–54)
HGB BLD-MCNC: 9.4 G/DL (ref 12.5–16.5)
IMM GRANULOCYTES # BLD AUTO: 0.04 K/UL (ref 0–0.58)
IMM GRANULOCYTES NFR BLD: 1 % (ref 0–5)
LYMPHOCYTES NFR BLD: 0.65 K/UL (ref 1.5–4)
LYMPHOCYTES RELATIVE PERCENT: 14 % (ref 20–42)
MAGNESIUM SERPL-MCNC: 2.1 MG/DL (ref 1.6–2.4)
MCH RBC QN AUTO: 25.3 PG (ref 26–35)
MCHC RBC AUTO-ENTMCNC: 31.4 G/DL (ref 32–34.5)
MCV RBC AUTO: 80.6 FL (ref 80–99.9)
MICROORGANISM SPEC CULT: NORMAL
MONOCYTES NFR BLD: 0.41 K/UL (ref 0.1–0.95)
MONOCYTES NFR BLD: 9 % (ref 2–12)
MYCOPLASMA AB,IGM: NORMAL
NEUTROPHILS NFR BLD: 76 % (ref 43–80)
NEUTS SEG NFR BLD: 3.53 K/UL (ref 1.8–7.3)
PHOSPHATE SERPL-MCNC: 2.4 MG/DL (ref 2.5–4.5)
PLATELET # BLD AUTO: 163 K/UL (ref 130–450)
PMV BLD AUTO: 11.2 FL (ref 7–12)
POTASSIUM SERPL-SCNC: 4 MMOL/L (ref 3.5–5.1)
PROT SERPL-MCNC: 5.9 G/DL (ref 6.4–8.3)
RBC # BLD AUTO: 3.71 M/UL (ref 3.8–5.8)
SODIUM SERPL-SCNC: 135 MMOL/L (ref 136–145)
SPECIMEN DESCRIPTION: NORMAL
WBC OTHER # BLD: 4.6 K/UL (ref 4.5–11.5)

## 2025-06-23 PROCEDURE — 85025 COMPLETE CBC W/AUTO DIFF WBC: CPT

## 2025-06-23 PROCEDURE — 94669 MECHANICAL CHEST WALL OSCILL: CPT

## 2025-06-23 PROCEDURE — 80053 COMPREHEN METABOLIC PANEL: CPT

## 2025-06-23 PROCEDURE — 82962 GLUCOSE BLOOD TEST: CPT

## 2025-06-23 PROCEDURE — 99233 SBSQ HOSP IP/OBS HIGH 50: CPT | Performed by: INTERNAL MEDICINE

## 2025-06-23 PROCEDURE — 2500000003 HC RX 250 WO HCPCS: Performed by: INTERNAL MEDICINE

## 2025-06-23 PROCEDURE — 99232 SBSQ HOSP IP/OBS MODERATE 35: CPT | Performed by: INTERNAL MEDICINE

## 2025-06-23 PROCEDURE — 93306 TTE W/DOPPLER COMPLETE: CPT

## 2025-06-23 PROCEDURE — 6370000000 HC RX 637 (ALT 250 FOR IP)

## 2025-06-23 PROCEDURE — 2580000003 HC RX 258

## 2025-06-23 PROCEDURE — 94660 CPAP INITIATION&MGMT: CPT

## 2025-06-23 PROCEDURE — 36415 COLL VENOUS BLD VENIPUNCTURE: CPT

## 2025-06-23 PROCEDURE — 3430000000 HC RX DIAGNOSTIC RADIOPHARMACEUTICAL: Performed by: RADIOLOGY

## 2025-06-23 PROCEDURE — 84100 ASSAY OF PHOSPHORUS: CPT

## 2025-06-23 PROCEDURE — 93017 CV STRESS TEST TRACING ONLY: CPT

## 2025-06-23 PROCEDURE — 6360000002 HC RX W HCPCS

## 2025-06-23 PROCEDURE — A9500 TC99M SESTAMIBI: HCPCS | Performed by: RADIOLOGY

## 2025-06-23 PROCEDURE — 2500000003 HC RX 250 WO HCPCS

## 2025-06-23 PROCEDURE — 78452 HT MUSCLE IMAGE SPECT MULT: CPT

## 2025-06-23 PROCEDURE — 94640 AIRWAY INHALATION TREATMENT: CPT

## 2025-06-23 PROCEDURE — 6360000002 HC RX W HCPCS: Performed by: INTERNAL MEDICINE

## 2025-06-23 PROCEDURE — 6370000000 HC RX 637 (ALT 250 FOR IP): Performed by: INTERNAL MEDICINE

## 2025-06-23 PROCEDURE — 1200000000 HC SEMI PRIVATE

## 2025-06-23 PROCEDURE — 83735 ASSAY OF MAGNESIUM: CPT

## 2025-06-23 PROCEDURE — 2700000000 HC OXYGEN THERAPY PER DAY

## 2025-06-23 RX ORDER — TETRAKIS(2-METHOXYISOBUTYLISOCYANIDE)COPPER(I) TETRAFLUOROBORATE 1 MG/ML
30 INJECTION, POWDER, LYOPHILIZED, FOR SOLUTION INTRAVENOUS
Status: COMPLETED | OUTPATIENT
Start: 2025-06-23 | End: 2025-06-24

## 2025-06-23 RX ORDER — PREDNISONE 20 MG/1
20 TABLET ORAL DAILY
Status: DISCONTINUED | OUTPATIENT
Start: 2025-06-23 | End: 2025-06-24 | Stop reason: HOSPADM

## 2025-06-23 RX ORDER — INSULIN GLARGINE 100 [IU]/ML
12 INJECTION, SOLUTION SUBCUTANEOUS NIGHTLY
Status: DISCONTINUED | OUTPATIENT
Start: 2025-06-23 | End: 2025-06-24 | Stop reason: HOSPADM

## 2025-06-23 RX ORDER — TETRAKIS(2-METHOXYISOBUTYLISOCYANIDE)COPPER(I) TETRAFLUOROBORATE 1 MG/ML
10 INJECTION, POWDER, LYOPHILIZED, FOR SOLUTION INTRAVENOUS
Status: COMPLETED | OUTPATIENT
Start: 2025-06-23 | End: 2025-06-23

## 2025-06-23 RX ORDER — LEVOFLOXACIN 750 MG/1
750 TABLET, FILM COATED ORAL EVERY OTHER DAY
Status: DISCONTINUED | OUTPATIENT
Start: 2025-06-24 | End: 2025-06-24 | Stop reason: HOSPADM

## 2025-06-23 RX ORDER — REGADENOSON 0.08 MG/ML
0.4 INJECTION, SOLUTION INTRAVENOUS
Status: COMPLETED | OUTPATIENT
Start: 2025-06-24 | End: 2025-06-24

## 2025-06-23 RX ADMIN — INSULIN LISPRO 4 UNITS: 100 INJECTION, SOLUTION INTRAVENOUS; SUBCUTANEOUS at 16:47

## 2025-06-23 RX ADMIN — ASPIRIN 81 MG: 81 TABLET, CHEWABLE ORAL at 08:24

## 2025-06-23 RX ADMIN — PANTOPRAZOLE SODIUM 40 MG: 40 TABLET, DELAYED RELEASE ORAL at 05:47

## 2025-06-23 RX ADMIN — BUDESONIDE 500 MCG: 0.5 SUSPENSION RESPIRATORY (INHALATION) at 04:40

## 2025-06-23 RX ADMIN — INSULIN LISPRO 4 UNITS: 100 INJECTION, SOLUTION INTRAVENOUS; SUBCUTANEOUS at 13:38

## 2025-06-23 RX ADMIN — METHYLPREDNISOLONE SODIUM SUCCINATE 40 MG: 40 INJECTION, POWDER, LYOPHILIZED, FOR SOLUTION INTRAMUSCULAR; INTRAVENOUS at 05:32

## 2025-06-23 RX ADMIN — INSULIN LISPRO 4 UNITS: 100 INJECTION, SOLUTION INTRAVENOUS; SUBCUTANEOUS at 08:25

## 2025-06-23 RX ADMIN — IPRATROPIUM BROMIDE AND ALBUTEROL SULFATE 1 DOSE: .5; 2.5 SOLUTION RESPIRATORY (INHALATION) at 14:33

## 2025-06-23 RX ADMIN — IPRATROPIUM BROMIDE AND ALBUTEROL SULFATE 1 DOSE: .5; 2.5 SOLUTION RESPIRATORY (INHALATION) at 04:41

## 2025-06-23 RX ADMIN — ATORVASTATIN CALCIUM 40 MG: 40 TABLET, FILM COATED ORAL at 19:52

## 2025-06-23 RX ADMIN — LEVOFLOXACIN 750 MG: 750 TABLET, FILM COATED ORAL at 08:24

## 2025-06-23 RX ADMIN — PREDNISONE 20 MG: 20 TABLET ORAL at 13:38

## 2025-06-23 RX ADMIN — BUDESONIDE 500 MCG: 0.5 SUSPENSION RESPIRATORY (INHALATION) at 18:43

## 2025-06-23 RX ADMIN — HEPARIN SODIUM 5000 UNITS: 5000 INJECTION INTRAVENOUS; SUBCUTANEOUS at 19:52

## 2025-06-23 RX ADMIN — HEPARIN SODIUM 5000 UNITS: 5000 INJECTION INTRAVENOUS; SUBCUTANEOUS at 08:24

## 2025-06-23 RX ADMIN — Medication 10 MILLICURIE: at 12:50

## 2025-06-23 RX ADMIN — SODIUM PHOSPHATE, MONOBASIC, MONOHYDRATE AND SODIUM PHOSPHATE, DIBASIC, ANHYDROUS 15 MMOL: 142; 276 INJECTION, SOLUTION INTRAVENOUS at 17:17

## 2025-06-23 RX ADMIN — ISOSORBIDE MONONITRATE 30 MG: 30 TABLET, EXTENDED RELEASE ORAL at 08:24

## 2025-06-23 RX ADMIN — METOPROLOL TARTRATE 25 MG: 25 TABLET, FILM COATED ORAL at 08:24

## 2025-06-23 RX ADMIN — IPRATROPIUM BROMIDE AND ALBUTEROL SULFATE 1 DOSE: .5; 2.5 SOLUTION RESPIRATORY (INHALATION) at 18:43

## 2025-06-23 ASSESSMENT — PAIN SCALES - GENERAL
PAINLEVEL_OUTOF10: 0
PAINLEVEL_OUTOF10: 0

## 2025-06-23 NOTE — PROGRESS NOTES
INPATIENT CARDIOLOGY CONSULT    Name: Eric Zarco    Age: 74 y.o.    Date of Admission: 6/20/2025  5:14 PM    Date of Service: 6/23/2025    Reason for Consultation:   Chief Complaint   Patient presents with    Shortness of Breath     Hx of COPD, has been getting progressively worse          Referring Physician: Eric Raza DO    History of Present Illness: 74-year-old male with below medical history who is hospitalized for shortness of breath, dyspnea on exertion, cough with thick whitish productive mucus and echo to be hypoxic on room air at the emergency room.  Patient is currently being treated for pneumonia and cardiology is consulted.  Apparently patient reports he has known history of coronary artery disease with prior cath showing mild blockages according to patient      Interim:  Denies chest pain and report breathing is improving  Awaiting Lexiscan myocardial perfusion stress test tomorrow  N.p.o. after midnight      Past Medical History:  Past Medical History:   Diagnosis Date    CAD (coronary artery disease)     has had 2 cardiac caths   states has small blockages    Community acquired pneumonia of right lower lobe of lung 6/20/2025    COPD (chronic obstructive pulmonary disease) (HCC)     Diabetes mellitus (HCC)     Gastric ulcer     surgery in 1983    Hypertension     Sleep apnea     doesnt use his cpap       Past Surgical History:  Past Surgical History:   Procedure Laterality Date    APPENDECTOMY      CATARACT REMOVAL Left 08/15/2017    left eye cataract extraction iwth ioc    CATARACT REMOVAL WITH IMPLANT Right 09/18/2018    COLONOSCOPY      ENDOSCOPY, COLON, DIAGNOSTIC      FRACTURE SURGERY      jaw    OTHER SURGICAL HISTORY      gastric ulcer surgery    [place] XCAPSL CTRC RMVL INSJ IO LENS PROSTH W/O ECP Right 9/18/2018    CATARACT EXTRACTION WITH IOL RIGHT EYE performed by Jj PAULINO MD at [facility] OR       Family History:  Family History   Problem Relation Age of Onset    Cancer  septal defect.       Stress test reviewed: January 2021     Gated SPECT left ventricular ejection fraction was calculated to be 52%, with normal myocardial thickening and wall motion.     Impression:    Electrocardiographically normal regadenoson infusion with a clinicallynonischemic response.  Myocardial perfusion imaging was normal with attenuation artifact.    Overall left ventricular systolic function was normal without regional wall motion abnormalities.  4.   Low risk general pharmacologic stress test           CXR reviewed:     The ASCVD Risk score (Myke PRIETO, et al., 2019) failed to calculate for the following reasons:    Risk score cannot be calculated because patient has a medical history suggesting prior/existing ASCVD    ASSESSMENT:    Pneumonia of right lower lobe due to infectious organism  Abnormal EKG  Frequent PVCs  Coronary artery disease per patient having had prior cardiac cath for which he report it revealed mild blockages  Inferior Q waves  Dyspnea on exertion  History of heart failure with preserved EF  Moderate LVH  Shortness of breath  Fatigue with activities  CKD stage IIIa  Acute anemia  HLD   GERD   Type II non-insulin dependent diabetes mellitus   CHF   Class I obesity BMI 32.05 kg/m2     PLAN  Evaluation and management of pneumonia per primary other specialist involved  Awaiting Lexiscan myocardial perfusion stress test tomorrow  Continue aspirin, Imdur, beta-blocker and statin   If blood pressure remained uncontrolled switch from metoprolol tartrate to carvedilol  Trend cardiac enzymes and EKG  Uptitrate beta-blocker to suppress PVCs if possible  TSH and Fastin lipid profile if not has been done recently  Outpatient sleep apnea evaluation  Awaiting echocardiogram to guide therapy  Monitor closely on telemetry  Monitor electrolytes and keep calcium at 4 magnesium at 2  If echo revealed low EF then changed from metoprolol to tartrate to carvedilol if blood pressure remain uncontrolled or

## 2025-06-23 NOTE — DISCHARGE INSTRUCTIONS
Your information:  Name: Eric Zarco  : 1950    Your instructions:    YOU ARE BEING DISCHARGED HOME. PLEASE MAKE AND KEEP YOUR FOLLOW UP APPOINTMENTS.    What to do after you leave the hospital:    Recommended diet: regular diet and diabetic diet    Recommended activity: activity as tolerated    IF YOU EXPERIENCE ANY OF THE FOLLOWING SYMPTOMS, CHEST PAIN, SHORTNESS OF BREATH, COUGHING UP BLOOD OR BLOODY SPUTUM, STOMACH PAIN OR CRAMPING, DARK, TARRY STOOLS, LOSS OF APPETITE, GENERAL NOT FEELING WELL, SIGNS AND SYMPTOMS OF INFECTION LIKE FEVER AND OR CHILLS, PLEASE CALL DR POTOCKI OR RETURN TO THE EMERGENCY ROOM.    The following personal items were collected during your admission and were returned to you:    Belongings  Dental Appliances: None  Vision - Corrective Lenses: None  Hearing Aid: None  Clothing: Pajamas, Shirt  Jewelry: Ring (2)  Body Piercings Removed: N/A  Electronic Devices: None  Weapons (Notify Protective Services/Security): None  Other Valuables: At home  Home Medications: None  Valuables Given To: Patient  Provide Name(s) of Who Valuable(s) Were Given To: Eric    Information obtained by:  By signing below, I understand that if any problems occur once I leave the hospital I am to contact Dr Potocki or go to emergency room.  I understand and acknowledge receipt of the instructions indicated above.                          HEART FAILURE  / CONGESTIVE HEART FAILURE  DISCHARGE INSTRUCTIONS:  GUIDELINES TO FOLLOW AT HOME    Self- Managed Care:     MEDICATIONS:  Take your medication as directed. If you are experiencing any side effects, inform your doctor, Do not stop taking any of your medications without letting your doctor know.   Check with your doctor before taking any over-the-counter medications / herbal / or dietary supplements. They may interfere with your other medications.  Do not take ibuprofen (Advil or Motrin) and naproxen (Aleve) without talking to your doctor first. They  could make your heart failure worse.         WEIGHT MONITORING:   Weigh yourself everyday (with the same scale) around the same time of the day and write it down. (you can chart them on a calendar or keep track of them on paper.   Notify your doctor of a weight gain of 3 pounds or more in 1 day   OR a total of 5 pounds or more in 1 week    Take your weight record to your doctor visits  Also, the same goes if you loose more than 3# in one day, let your heart doctor know.         DIET:   Cardiac heart healthy diet- Low saturated / low trans fat, no added salt, caffeine restricted, Low sodium diet-   No more than 2,000mg (2 grams) of salt / sodium per day (which equals to a little less than  a teaspoon of salt)  If your doctor wants you on a fluid restriction...it is usually recommended a fluid limit of 2,000cc -  Fluid restriction- 2,000 ml (milliliters) = 64 ounces = you can have 8 glasses of fluid per day (each glass 8 ounces)    Follow a low salt diet - avoid using salt at the table, avoid / limit use of canned soups, processed / packaged foods, salted snacks, olives and pickles.  Do not use a salt substitute without checking with your doctor, they may contain a high amount of potassioum. (Mrs. Dash is safe to use).    Limit the use of alcohol       CALL YOUR DOCTOR THE FIRST DAY YOU NOTICE ANY OF THESE   SYMPTOMS:  You have a weight gain of 3 pounds or more in 1 day         OR 5 pounds or more in one week  More shortness of breath  More swelling of your stomach, legs, ankles or feet  Feeling more tired, No energy  Dry hacky cough  Dizziness  More chest pain / discomfort       (CALL 911 IF ANY OF THE FOLLOWING OCCURS  Chest pain (not relieved with nitroglycerine, if you have been prescribed this medication)  Severe shortness of breath  Faint / Pass out  Confusion / cannot think clearly  If symptoms get worse           SMOKING - TOBACCO USE:  * IF YOU SMOKE - STOP!  Kick the habit.  [facility]

## 2025-06-23 NOTE — PROGRESS NOTES
[facility]  Department of Internal Medicine  Division of Pulmonary, Critical Care and Sleep Medicine  Progress Note      Matt MICHELLE Melissafiordaliza Wagoner DO Umberto Pina, APRN-CNP  Glenys Javier, APRN-CNP        Reason for consult: Pneumonia, metabolic acidosis     Subjective:  Patient seen and examined.  Patient was seen on room air, in no acute distress.  He denies any current respiratory complaints during time of evaluation.  He reports plan is for stress test tomorrow.  Family present at the bedside.    OBJECTIVE:     PHYSICAL EXAM:   VITALS:   Vitals:    06/22/25 1913 06/23/25 0500 06/23/25 0738 06/23/25 0824   BP: 125/77  (!) 148/72 (!) 148/72   Pulse: 83  86 86   Resp: 17  18    Temp: 97.9 °F (36.6 °C)  97.9 °F (36.6 °C)    TempSrc: Oral  Oral    SpO2: 96%  98%    Weight:  112.4 kg (247 lb 11.2 oz)     Height:            Intake/Output Summary (Last 24 hours) at 6/23/2025 1143  Last data filed at 6/23/2025 0824  Gross per 24 hour   Intake 1760 ml   Output 3650 ml   Net -1890 ml        CONSTITUTIONAL:   A&O x 3, NAD  SKIN:     No rash, No suspicious lesions, No skin discoloration  HEENT:     EOMI, MMM, No thrush  NECK:    No bruits, No JVP appreciated  CV:      Sinus,  No murmur, No rubs, No gallops  PULMONARY:   Decreased breath sounds at bases greater on right, no Wheezing, No Rales, No Rhonchi  ABDOMEN:     Soft, non-tender. BS normal. No R/R/G  EXT:    No deformities .  No clubbing.       No lower extremity edema, No venous stasis  PULSE:   Appears equal and palpable.  PSYCHIATRIC:  Seems appropriate, No acute psychosis  MS:    No fractures, No gross weakness  NEUROLOGIC:   The clinical assessment is non-focal     DATA: IMAGING & TESTING:     LABORATORY TESTS:        PRO-BNP:   Lab Results   Component Value Date    PROBNP 6,926 (H) 06/20/2025    PROBNP 601 (H)

## 2025-06-23 NOTE — ACP (ADVANCE CARE PLANNING)
Advance Care Planning   Healthcare Decision Maker:    Primary Decision Maker: Eva Zarco - [facility] - [phone]

## 2025-06-23 NOTE — PLAN OF CARE
Patient's chart updated to reflect:        - HF care plan, HF education points and HF discharge instructions.  -Orders: 2 gram sodium diet, daily weights, I/O.  -PCP and cardiology follow up appointments to be scheduled within 7 days of hospital discharge.  -CHF education session will be provided to the patient prior to hospital discharge.    most recent EF:  Lab Results   Component Value Date    LVEF 50 08/01/2023       Sindi Gaona, RN MSN,RN  Heart Failure Navigator    Future Appointments   Date Time Provider Department Center   6/23/2025  1:55 PM [facility] STRESS LAB 1 [facility]

## 2025-06-23 NOTE — CARE COORDINATION
6/23/2025 1111 CM note: Met with patient, his wife and dtr at bedside for care coordination plan.  Pt is independent and resides with his wife in a Sr apt complex, [address]st level, [address]. PCP is Dr Potocki. He is on room air and is for stress test in am. Plan is for pt to return home. No needs identified at this time. Margie NICHOLS

## 2025-06-24 VITALS
DIASTOLIC BLOOD PRESSURE: 77 MMHG | OXYGEN SATURATION: 94 % | TEMPERATURE: 97.7 F | BODY MASS INDEX: 33.13 KG/M2 | HEIGHT: 73 IN | SYSTOLIC BLOOD PRESSURE: 151 MMHG | RESPIRATION RATE: 19 BRPM | WEIGHT: 250 LBS | HEART RATE: 89 BPM

## 2025-06-24 LAB
ECHO AO ASC DIAM: 3 CM
ECHO AO ASCENDING AORTA INDEX: 1.27 CM/M2
ECHO AV AREA PEAK VELOCITY: 2.4 CM2
ECHO AV AREA VTI: 2.6 CM2
ECHO AV AREA/BSA PEAK VELOCITY: 1 CM2/M2
ECHO AV AREA/BSA VTI: 1.1 CM2/M2
ECHO AV CUSP MM: 2.3 CM
ECHO AV MEAN GRADIENT: 6 MMHG
ECHO AV MEAN VELOCITY: 1.2 M/S
ECHO AV PEAK GRADIENT: 12 MMHG
ECHO AV PEAK VELOCITY: 1.8 M/S
ECHO AV VELOCITY RATIO: 0.56
ECHO AV VTI: 37.7 CM
ECHO BSA: 2.37 M2
ECHO BSA: 2.37 M2
ECHO EST RA PRESSURE: 15 MMHG
ECHO LA DIAMETER INDEX: 2.2 CM/M2
ECHO LA DIAMETER: 5.2 CM
ECHO LA VOL A-L A2C: 116 ML (ref 18–58)
ECHO LA VOL A-L A4C: 91 ML (ref 18–58)
ECHO LA VOL BP: 99 ML (ref 18–58)
ECHO LA VOL MOD A2C: 110 ML (ref 18–58)
ECHO LA VOL MOD A4C: 86 ML (ref 18–58)
ECHO LA VOL/BSA BIPLANE: 42 ML/M2 (ref 16–34)
ECHO LA VOLUME AREA LENGTH: 104 ML
ECHO LA VOLUME INDEX A-L A2C: 49 ML/M2 (ref 16–34)
ECHO LA VOLUME INDEX A-L A4C: 39 ML/M2 (ref 16–34)
ECHO LA VOLUME INDEX AREA LENGTH: 44 ML/M2 (ref 16–34)
ECHO LA VOLUME INDEX MOD A2C: 47 ML/M2 (ref 16–34)
ECHO LA VOLUME INDEX MOD A4C: 36 ML/M2 (ref 16–34)
ECHO LV EDV A2C: 223 ML
ECHO LV EDV A4C: 202 ML
ECHO LV EDV BP: 213 ML (ref 67–155)
ECHO LV EDV INDEX A4C: 86 ML/M2
ECHO LV EDV INDEX BP: 90 ML/M2
ECHO LV EDV NDEX A2C: 94 ML/M2
ECHO LV EJECTION FRACTION 3D: 39 %
ECHO LV EJECTION FRACTION A2C: 45 %
ECHO LV EJECTION FRACTION A4C: 39 %
ECHO LV EJECTION FRACTION BIPLANE: 39 % (ref 55–100)
ECHO LV ESV A2C: 123 ML
ECHO LV ESV A4C: 124 ML
ECHO LV ESV BP: 131 ML (ref 22–58)
ECHO LV ESV INDEX A2C: 52 ML/M2
ECHO LV ESV INDEX A4C: 53 ML/M2
ECHO LV ESV INDEX BP: 56 ML/M2
ECHO LV FRACTIONAL SHORTENING: 26 % (ref 28–44)
ECHO LV INTERNAL DIMENSION DIASTOLE INDEX: 1.95 CM/M2
ECHO LV INTERNAL DIMENSION DIASTOLIC: 4.6 CM (ref 4.2–5.9)
ECHO LV INTERNAL DIMENSION SYSTOLIC INDEX: 1.44 CM/M2
ECHO LV INTERNAL DIMENSION SYSTOLIC: 3.4 CM
ECHO LV ISOVOLUMETRIC RELAXATION TIME (IVRT): 129.4 MS
ECHO LV IVSD: 1.6 CM (ref 0.6–1)
ECHO LV MASS 2D: 284.8 G (ref 88–224)
ECHO LV MASS INDEX 2D: 120.7 G/M2 (ref 49–115)
ECHO LV POSTERIOR WALL DIASTOLIC: 1.4 CM (ref 0.6–1)
ECHO LV RELATIVE WALL THICKNESS RATIO: 0.61
ECHO LVOT AREA: 4.5 CM2
ECHO LVOT AV VTI INDEX: 0.58
ECHO LVOT DIAM: 2.4 CM
ECHO LVOT MEAN GRADIENT: 2 MMHG
ECHO LVOT PEAK GRADIENT: 4 MMHG
ECHO LVOT PEAK VELOCITY: 1 M/S
ECHO LVOT STROKE VOLUME INDEX: 42.2 ML/M2
ECHO LVOT SV: 99.5 ML
ECHO LVOT VTI: 22 CM
ECHO MV "A" WAVE DURATION: 129.4 MSEC
ECHO MV A VELOCITY: 0.49 M/S
ECHO MV AREA PHT: 2.8 CM2
ECHO MV AREA VTI: 3.7 CM2
ECHO MV E DECELERATION TIME (DT): 208.3 MS
ECHO MV E VELOCITY: 1.05 M/S
ECHO MV E/A RATIO: 2.14
ECHO MV LVOT VTI INDEX: 1.22
ECHO MV MAX VELOCITY: 1.1 M/S
ECHO MV MEAN GRADIENT: 2 MMHG
ECHO MV MEAN VELOCITY: 0.6 M/S
ECHO MV PEAK GRADIENT: 5 MMHG
ECHO MV PRESSURE HALF TIME (PHT): 79.6 MS
ECHO MV VTI: 26.8 CM
ECHO PV MAX VELOCITY: 0.9 M/S
ECHO PV MEAN GRADIENT: 2 MMHG
ECHO PV MEAN VELOCITY: 0.7 M/S
ECHO PV PEAK GRADIENT: 3 MMHG
ECHO PV VTI: 18.8 CM
ECHO PVEIN A DURATION: 114.2 MS
ECHO PVEIN A VELOCITY: 0.4 M/S
ECHO PVEIN PEAK D VELOCITY: 0.7 M/S
ECHO PVEIN PEAK S VELOCITY: 0.4 M/S
ECHO PVEIN S/D RATIO: 0.6
ECHO RIGHT VENTRICULAR SYSTOLIC PRESSURE (RVSP): 37 MMHG
ECHO RV INTERNAL DIMENSION: 4.6 CM
ECHO RV LONGITUDINAL DIMENSION: 9.8 CM
ECHO RV MID DIMENSION: 3.6 CM
ECHO TV REGURGITANT MAX VELOCITY: 2.33 M/S
ECHO TV REGURGITANT PEAK GRADIENT: 22 MMHG
GLUCOSE BLD-MCNC: 192 MG/DL (ref 74–99)
GLUCOSE BLD-MCNC: 193 MG/DL (ref 74–99)
GLUCOSE BLD-MCNC: 199 MG/DL (ref 74–99)
HBA1C MFR BLD: 5.4 % (ref 4–5.6)
NUC STRESS EJECTION FRACTION: 40 %
STRESS BASELINE DIAS BP: 71 MMHG
STRESS BASELINE HR: 75 BPM
STRESS BASELINE SYS BP: 160 MMHG
STRESS O2 SAT REST: 98 %
STRESS STAGE 1 BP: NORMAL MMHG
STRESS STAGE 1 COMMENTS: NORMAL
STRESS STAGE 1 DURATION: 1 MIN:SEC
STRESS STAGE 1 HR: 80 BPM
STRESS STAGE RECOVERY 1 BP: NORMAL MMHG
STRESS STAGE RECOVERY 1 COMMENTS: NORMAL
STRESS STAGE RECOVERY 1 DURATION: 1 MIN:SEC
STRESS STAGE RECOVERY 1 HR: 82 BPM
STRESS STAGE RECOVERY 2 BP: NORMAL MMHG
STRESS STAGE RECOVERY 2 COMMENTS: NORMAL
STRESS STAGE RECOVERY 2 DURATION: 1 MIN:SEC
STRESS STAGE RECOVERY 2 HR: 79 BPM
STRESS STAGE RECOVERY 3 BP: NORMAL MMHG
STRESS STAGE RECOVERY 3 COMMENTS: NORMAL
STRESS STAGE RECOVERY 3 DURATION: 1 MIN:SEC
STRESS STAGE RECOVERY 3 HR: 78 BPM
STRESS STAGE RECOVERY 4 BP: NORMAL MMHG
STRESS STAGE RECOVERY 4 COMMENTS: NORMAL
STRESS STAGE RECOVERY 4 DURATION: 1 MIN:SEC
STRESS STAGE RECOVERY 4 HR: 89 BPM
STRESS TARGET HR: 145 BPM

## 2025-06-24 PROCEDURE — 94640 AIRWAY INHALATION TREATMENT: CPT

## 2025-06-24 PROCEDURE — 6370000000 HC RX 637 (ALT 250 FOR IP)

## 2025-06-24 PROCEDURE — 6370000000 HC RX 637 (ALT 250 FOR IP): Performed by: INTERNAL MEDICINE

## 2025-06-24 PROCEDURE — 6360000002 HC RX W HCPCS

## 2025-06-24 PROCEDURE — 93016 CV STRESS TEST SUPVJ ONLY: CPT | Performed by: INTERNAL MEDICINE

## 2025-06-24 PROCEDURE — 94669 MECHANICAL CHEST WALL OSCILL: CPT

## 2025-06-24 PROCEDURE — 82962 GLUCOSE BLOOD TEST: CPT

## 2025-06-24 PROCEDURE — 6360000002 HC RX W HCPCS: Performed by: INTERNAL MEDICINE

## 2025-06-24 PROCEDURE — 94660 CPAP INITIATION&MGMT: CPT

## 2025-06-24 PROCEDURE — A9500 TC99M SESTAMIBI: HCPCS | Performed by: RADIOLOGY

## 2025-06-24 PROCEDURE — 93306 TTE W/DOPPLER COMPLETE: CPT | Performed by: INTERNAL MEDICINE

## 2025-06-24 PROCEDURE — 78452 HT MUSCLE IMAGE SPECT MULT: CPT | Performed by: INTERNAL MEDICINE

## 2025-06-24 PROCEDURE — 99233 SBSQ HOSP IP/OBS HIGH 50: CPT | Performed by: INTERNAL MEDICINE

## 2025-06-24 PROCEDURE — 99232 SBSQ HOSP IP/OBS MODERATE 35: CPT | Performed by: INTERNAL MEDICINE

## 2025-06-24 PROCEDURE — 93018 CV STRESS TEST I&R ONLY: CPT | Performed by: INTERNAL MEDICINE

## 2025-06-24 PROCEDURE — 3430000000 HC RX DIAGNOSTIC RADIOPHARMACEUTICAL: Performed by: RADIOLOGY

## 2025-06-24 PROCEDURE — 83036 HEMOGLOBIN GLYCOSYLATED A1C: CPT

## 2025-06-24 RX ORDER — PREDNISONE 20 MG/1
20 TABLET ORAL DAILY
Qty: 10 TABLET | Refills: 0 | Status: SHIPPED | OUTPATIENT
Start: 2025-06-25 | End: 2025-07-05

## 2025-06-24 RX ORDER — LEVOFLOXACIN 750 MG/1
750 TABLET, FILM COATED ORAL EVERY OTHER DAY
Qty: 2 TABLET | Refills: 0 | Status: SHIPPED | OUTPATIENT
Start: 2025-06-26 | End: 2025-06-29

## 2025-06-24 RX ORDER — LANOLIN ALCOHOL/MO/W.PET/CERES
1000 CREAM (GRAM) TOPICAL DAILY
Status: DISCONTINUED | OUTPATIENT
Start: 2025-06-24 | End: 2025-06-24 | Stop reason: HOSPADM

## 2025-06-24 RX ORDER — METOPROLOL SUCCINATE 25 MG/1
25 TABLET, EXTENDED RELEASE ORAL DAILY
Status: DISCONTINUED | OUTPATIENT
Start: 2025-06-24 | End: 2025-06-24 | Stop reason: HOSPADM

## 2025-06-24 RX ADMIN — INSULIN LISPRO 4 UNITS: 100 INJECTION, SOLUTION INTRAVENOUS; SUBCUTANEOUS at 11:40

## 2025-06-24 RX ADMIN — ISOSORBIDE MONONITRATE 30 MG: 30 TABLET, EXTENDED RELEASE ORAL at 11:40

## 2025-06-24 RX ADMIN — ASPIRIN 81 MG: 81 TABLET, CHEWABLE ORAL at 11:40

## 2025-06-24 RX ADMIN — CYANOCOBALAMIN TAB 1000 MCG 1000 MCG: 1000 TAB at 11:49

## 2025-06-24 RX ADMIN — SACUBITRIL AND VALSARTAN 1 TABLET: 24; 26 TABLET, FILM COATED ORAL at 11:39

## 2025-06-24 RX ADMIN — PANTOPRAZOLE SODIUM 40 MG: 40 TABLET, DELAYED RELEASE ORAL at 05:13

## 2025-06-24 RX ADMIN — REGADENOSON 0.4 MG: 0.08 INJECTION, SOLUTION INTRAVENOUS at 09:25

## 2025-06-24 RX ADMIN — LEVOFLOXACIN 750 MG: 750 TABLET, FILM COATED ORAL at 11:40

## 2025-06-24 RX ADMIN — PREDNISONE 20 MG: 20 TABLET ORAL at 11:40

## 2025-06-24 RX ADMIN — INSULIN LISPRO 4 UNITS: 100 INJECTION, SOLUTION INTRAVENOUS; SUBCUTANEOUS at 16:41

## 2025-06-24 RX ADMIN — METOPROLOL TARTRATE 25 MG: 25 TABLET, FILM COATED ORAL at 11:40

## 2025-06-24 RX ADMIN — IPRATROPIUM BROMIDE AND ALBUTEROL SULFATE 1 DOSE: .5; 2.5 SOLUTION RESPIRATORY (INHALATION) at 14:51

## 2025-06-24 RX ADMIN — Medication 30 MILLICURIE: at 10:50

## 2025-06-24 RX ADMIN — HEPARIN SODIUM 5000 UNITS: 5000 INJECTION INTRAVENOUS; SUBCUTANEOUS at 11:41

## 2025-06-24 RX ADMIN — IPRATROPIUM BROMIDE AND ALBUTEROL SULFATE 1 DOSE: .5; 2.5 SOLUTION RESPIRATORY (INHALATION) at 07:05

## 2025-06-24 RX ADMIN — BUDESONIDE 500 MCG: 0.5 SUSPENSION RESPIRATORY (INHALATION) at 07:05

## 2025-06-24 RX ADMIN — IPRATROPIUM BROMIDE AND ALBUTEROL SULFATE 1 DOSE: .5; 2.5 SOLUTION RESPIRATORY (INHALATION) at 11:01

## 2025-06-24 NOTE — DISCHARGE SUMMARY
agent.     Echo (TTE) complete (PRN contrast/bubble/strain/3D)  Result Date: 6/24/2025    Left Ventricle: The EF by visual approximation is 35 - 40%. Findings consistent with moderate asymmetric hypertrophy. Grade II diastolic dysfunction with increased LAP.   Right Ventricle: Right ventricle size is normal. Normal systolic function.   Aortic Valve: Not well visualized. No stenosis. AV Area by VTI is 2.6 cm2. AV Area by Peak Velocity is 2.4 cm2.   Mitral Valve: Trace regurgitation. No stenosis noted. MV Mean Gradient is 2 mmHg. MV Area by PHT is 2.8 cm2. MV Area by VTI is 3.7 cm2.   Tricuspid Valve: Mild regurgitation. RVSP is 37 mmHg.   Left Atrium: Not well visualized. Left atrium is moderately dilated.   Image quality is adequate.     XR CHEST (2 VW)  Result Date: 6/22/2025  EXAMINATION: TWO XRAY VIEWS OF THE CHEST 6/22/2025 9:12 am COMPARISON: 06/20/2025 HISTORY: ORDERING SYSTEM PROVIDED HISTORY: sob TECHNOLOGIST PROVIDED HISTORY: Reason for exam:->sob FINDINGS: There is increased renal glass opacity projecting over the right lower lung posteriorly concerning for pneumonia and or atelectasis.  Air bronchograms are noted in this region.  The left lung appears clear.  The pulmonary vasculature and heart size are within the range.  The bones of the thorax appear unremarkable.     There is increased right lower lung parenchymal density concerning for pneumonia and/or atelectasis.     CTA CHEST W CONTRAST  Result Date: 6/20/2025  EXAMINATION: CTA OF THE CHEST 6/20/2025 7:21 pm TECHNIQUE: CTA of the chest was performed after the administration of intravenous contrast.  Multiplanar reformatted images are provided for review.  MIP images are provided for review. Automated exposure control, iterative reconstruction, and/or weight based adjustment of the mA/kV was utilized to reduce the radiation dose to as low as reasonably achievable. COMPARISON: None. HISTORY: ORDERING SYSTEM PROVIDED HISTORY: r/o PE TECHNOLOGIST              FOLLOW UP/INSTRUCTIONS:  This patient is instructed to follow-up with his primary care physician.  Patient is instructed to follow-up with the consults listed above as directed by them.  he is instructed to resume home medications and take new medications as indicated in the list above.  If the patient has a recurrence of symptoms, he is instructed to go to the ED.    Preparing for this patient's discharge, including paperwork, orders, instructions, and meeting with patient did require > 40 minutes.    Deng Infante DO   6/24/2025  3:40 PM

## 2025-06-24 NOTE — CONSULTS
[facility]   Inpatient CHF Nurse Navigator Consult    Cardiologist: Dr. Kessler  Primary Care Physician: Potocki, Joseph A, DO Robert A Haroldo is a 74 y.o. (1950) male with a history of HFrEF, most recent EF:  Lab Results   Component Value Date    LVEF 50 08/01/2023       Patient was awake and alert, laying in bed during the consultation and is agreeable to heart failure education. He was engaged and asked appropriate questions throughout the education session.  Patient is agreeable to symptom monitoring, daily weights, and following a low sodium diet and follow up appointments with PCP, [facility] and establishing with the CHF clinic.     Barriers identified during consult contributing to HF Hospitalization: overwhelmed by complexity of regimen and comorbid factors.     [x] Limited medication adherence - new medications  [x] Poor health literacy, education regarding HF medications provided   [] Pill box provided to patient- pt uses a pill box  [] Difficulty affording medications  [] Difficulty obtaining/ managing medications  [] Prescription assistance information given     [] Not weighing themselves daily  [x] Weight log provided for easy monitoring  [] Scale provided     [x] Not following low sodium diet  [] Food insecurity   [x] 2 gram sodium diet education provided   [] Low sodium recipes provided  [] Sodium free seasoning provided   [] Low sodium meal delivery options given to patient  [] Dietician consulted     [] Lack of transportation to appointments - patient drives    [] Depression, given chronic illness  [] Primary team notified     [] Goals of care need addressed  [] Palliative care consulted     [] CHF community health worker Miesha Marrufo consulted [phone], to assist with n/a    Chart Reviewed:  Diet: ADULT DIET; Regular; 5 carb choices (75 gm/meal); Low Sodium (2 gm)   Daily Weights: Patient Vitals for the past 96 hrs (Last 3 readings):

## 2025-06-24 NOTE — PROGRESS NOTES
[facility]  Department of Internal Medicine  Division of Pulmonary, Critical Care and Sleep Medicine  Progress Note      Matt MICHELLE Maynardcampbell DO Umberto Pina, APRN-CNP  Glenys Javier, APRN-CNP        Reason for consult: Pneumonia, metabolic acidosis     Subjective:  Patient remains on room air  At stress test   No reported issues over night     OBJECTIVE:     PHYSICAL EXAM:   VITALS:   Vitals:    06/24/25 0442 06/24/25 0705 06/24/25 0730 06/24/25 1101   BP:   (!) 155/82    Pulse:   79    Resp:   19    Temp:   97.7 °F (36.5 °C)    TempSrc:   Oral    SpO2:  98% 97% 94%   Weight: 113.4 kg (250 lb)      Height:            Intake/Output Summary (Last 24 hours) at 6/24/2025 1127  Last data filed at 6/24/2025 0513  Gross per 24 hour   Intake 731.01 ml   Output 700 ml   Net 31.01 ml        CONSTITUTIONAL:   A&O x 3, NAD  SKIN:     No rash, No suspicious lesions, No skin discoloration  HEENT:     EOMI, MMM, No thrush  NECK:    No bruits, No JVP appreciated  CV:      Sinus,  No murmur, No rubs, No gallops  PULMONARY:   Decreased breath sounds at bases greater on right, no Wheezing, No Rales, No Rhonchi  ABDOMEN:     Soft, non-tender. BS normal. No R/R/G  EXT:    No deformities .  No clubbing.       No lower extremity edema, No venous stasis  PULSE:   Appears equal and palpable.  PSYCHIATRIC:  Seems appropriate, No acute psychosis  MS:    No fractures, No gross weakness  NEUROLOGIC:   The clinical assessment is non-focal     DATA: IMAGING & TESTING:     LABORATORY TESTS:        PRO-BNP:   Lab Results   Component Value Date    PROBNP 6,926 (H) 06/20/2025    PROBNP 601 (H) 07/30/2023      ABGs: No results found for: \"PH\", \"PO2\", \"PCO2\"  Hemoglobin A1C: No components found for: \"HGBA1C\"    IMAGING:  Imaging tests were completed and reviewed and discussed radiology and care

## 2025-06-24 NOTE — PLAN OF CARE
Problem: Chronic Conditions and Co-morbidities  Goal: Patient's chronic conditions and co-morbidity symptoms are monitored and maintained or improved  Outcome: Progressing     Problem: Safety - Adult  Goal: Free from fall injury  Outcome: Progressing     Problem: Nutrition Deficit:  Goal: Optimize nutritional status  Outcome: Progressing     Problem: Pain  Goal: Verbalizes/displays adequate comfort level or baseline comfort level  Outcome: Progressing     Problem: Cardiovascular - Adult  Goal: Maintains optimal cardiac output and hemodynamic stability  Outcome: Progressing     Problem: Cardiovascular - Adult  Goal: Absence of cardiac dysrhythmias or at baseline  Outcome: Progressing

## 2025-06-24 NOTE — PROGRESS NOTES
Housing Stability Vital Sign     Unable to Pay for Housing in the Last Year: No     Number of Times Moved in the Last Year: 1     Homeless in the Last Year: No       Allergies:  No Known Allergies    Home Medications:  Prior to Admission medications    Medication Sig Start Date End Date Taking? Authorizing Provider   levoFLOXacin (LEVAQUIN) 750 MG tablet Take 1 tablet by mouth every other day for 2 doses 6/26/25 6/29/25 Yes Deng Infante DO   predniSONE (DELTASONE) 20 MG tablet Take 1 tablet by mouth daily for 10 days 6/25/25 7/5/25 Yes Deng Infante DO   sacubitril-valsartan (ENTRESTO) 24-26 MG per tablet Take 1 tablet by mouth 2 times daily 6/24/25  Yes Deng Infante DO   vitamin B-12 1000 MCG tablet Take 1 tablet by mouth daily 6/25/25  Yes Deng Infante DO   docusate sodium (COLACE) 100 MG capsule Take 1 capsule by mouth 2 times daily 4/17/25  Yes Ruben Arana MD   Acetaminophen Extra Strength 500 MG TABS Take 1 tablet by mouth every 8 hours as needed (for mild pain (1-3)) 4/17/25  Yes Ruben Arana MD   traMADol (ULTRAM) 50 MG tablet Take 1 tablet by mouth every 6 hours as needed for Pain.   Yes Ruben Arana MD   atorvastatin (LIPITOR) 40 MG tablet Take 1 tablet by mouth nightly 8/1/23  Yes Deng Infante DO   pantoprazole (PROTONIX) 40 MG tablet Take 1 tablet by mouth daily   Yes Ruben Arana MD   glipiZIDE (GLUCOTROL) 5 MG tablet Take 1 tablet by mouth 2 times daily (before meals). 4/13/14  Yes Amanda Garrison DO, FACOI   metFORMIN (GLUCOPHAGE) 1000 MG tablet Take 1 tablet by mouth 2 times daily (with meals). 4/13/14  Yes Amanda Garrison DO, FACOI   isosorbide mononitrate (IMDUR) 30 MG CR tablet Take 1 tablet by mouth daily   Yes Ruben Arana MD   aspirin 81 MG tablet Take 1 tablet by mouth daily   Yes Ruben Arana MD   metoprolol (LOPRESSOR) 25 MG tablet Take 1 tablet by mouth daily   Yes Ruben Arana MD   Omega 3 1000 MG CAPS Take 1,000 mg  on 6/24/2025  3:38 PM    The ASCVD Risk score (Myke PRIETO, et al., 2019) failed to calculate for the following reasons:    Risk score cannot be calculated because patient has a medical history suggesting prior/existing ASCVD    ASSESSMENT:    Pneumonia of right lower lobe due to infectious organism  Abnormal EKG  Frequent PVCs  Coronary artery disease per patient having had prior cardiac cath for which he report it revealed mild blockages  Inferior Q waves  Dyspnea on exertion  History of heart failure with preserved EF  Moderate LVH  Shortness of breath  Fatigue with activities  CKD stage IIIa  Acute anemia  HLD   GERD   Type II non-insulin dependent diabetes mellitus   CHF   Class I obesity BMI 32.05 kg/m2     PLAN  Evaluation and management of pneumonia per primary other specialist involved  Stress test revealed no ischemia but EF is 40%  I have switched from metoprolol tartrate to metoprolol succinate  Patient is also on Entresto now as well as Isordil  Follow-up with cardiology for further optimization of guideline directed medical therapy with addition of spironolactone and Jardiance  Patient will need further as clinical evaluation perhaps with coronary CT angiogram but he reports he preferred to think about this and follow-up with cardiology in the outpatient to discuss further as he reports he would like to be discharged today  Continue aspirin, Imdur, beta-blocker and statin   Uptitrate beta-blocker to suppress PVCs if possible  TSH and Fastin lipid profile if not has been done recently  Outpatient sleep apnea evaluation  Monitor closely on telemetry  Monitor electrolytes and keep calcium at 4 magnesium at 2  14-day Zio patch heart monitor prior to discharge given frequent PVCs on EKG  Rest per primary other specialist involved        Greater than 50 minutes was spent counseling the patient, reviewing the rationale for the above recommendations and reviewing the patient's current medication list, problem

## 2025-06-24 NOTE — CONSULTS
CONSULTATION NOTE :ID     Patient - Eric Zarco,  Age - 74 y.o.    - 1950      Room Number - 0423/0423-01   MRN -  [id number]   [facility] # - [id number]  Date of Admission -  2025  5:14 PM  Patient's PCP: Potocki, Joseph A, DO     Requesting Physician: Eric Raza DO    HISTORY OF PRESENT ILLNESS   This is a 74 y.o. 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      Current antibiotics     levoFLOXacin (LEVAQUIN) tablet 750 mg, Every Other Day  predniSONE (DELTASONE) tablet 20 mg, Daily         PAST MEDICAL AND SURGICAL HISTORY     Past Medical History:   Diagnosis Date    CAD (coronary artery disease)     has had 2 cardiac caths   states has small blockages    Community acquired pneumonia of right lower lobe of lung 2025    COPD (chronic obstructive pulmonary disease) (HCC)     Diabetes mellitus (HCC)     Gastric ulcer     surgery in     Hypertension     Sleep apnea     doesnt use his cpap       Past Surgical History:   Procedure Laterality Date    APPENDECTOMY      CATARACT REMOVAL Left 08/15/2017    left eye

## 2025-06-24 NOTE — CARE COORDINATION
6/24/2025 0920 CM note: Pt is independent and resides with his wife in a Sr apt complex, 1st level, [address].  He is on room air and is for stress test today. Pt started on entresto for reduced EF and CHF nurse consulted.  Plan is for pt to return home. Await stress test results. Margie NICHOLS

## 2025-06-24 NOTE — PROGRESS NOTES
Internal Medicine Consult Note    [facility]    Eric Raza D.O., MINNIE.                    Deng Infante D.O., ANGELINA Epps D.O.     Virgil Ceballos, MSN, APRN-CNP  Jj Benitez, MSN, APRN-CNP  Miesha Arce, MSN APRN-CNP  Nu Denton, MSN. APRN-NP-C     Primary Care Physician: Potocki, Joseph A, DO   Admitting Physician:  Eric Raza DO  Admission date and time: 6/20/2025  5:14 PM    Room:  [address] [place] [address]  Admitting diagnosis: Pneumonia of right lower lobe due to infectious organism [J18.9]  Community acquired pneumonia of right lower lobe of lung [J18.9]    Patient Name: Eric Zarco  MRN: [id number]    Date of Service: 6/24/2025     Subjective:  Eric is a 74 y.o. male who was seen and examined today,6/24/2025, at the bedside.  Eric is resting comfortably this morning anxious for discharge home.  Plans are for stress test evaluation.  We again discussed the need for tobacco cessation    Review of System:   Constitutional:   Improving malaise and fatigue  HEENT:   Denies ear pain, sore throat, sinus or eye problems.  Cardiovascular:   Denies any chest pain, irregular heartbeats, or palpitations.   Respiratory:   Resolution of his presenting shortness of breath.  Gastrointestinal:   Denies nausea, vomiting, diarrhea, or constipation.  Denies any abdominal pain.  Genitourinary:    Denies any urgency, frequency, hematuria. Voiding  without difficulty.  Extremities:   Denies lower extremity swelling, edema or cyanosis.   Neurology:    Denies any headache or focal neurological deficits, Denies generalized weakness or memory difficulty.   Psch:   Denies being anxious or depressed.  Musculoskeletal:    Denies  myalgias, joint complaints or back pain.   Integumentary:   Denies any rashes, ulcers, or excoriations.  Denies bruising.  Hematologic/Lymphatic:  Denies bruising or bleeding.    Physical Exam:  No intake/output data  recorded.    Intake/Output Summary (Last 24 hours) at 6/24/2025 0840  Last data filed at 6/24/2025 0513  Gross per 24 hour   Intake 731.01 ml   Output 700 ml   Net 31.01 ml   I/O last 3 completed shifts:  In: 2491 [P.O.:1840; I.V.:400; IV Piggyback:251]  Out: 4350 [Urine:4350]  Patient Vitals for the past 96 hrs (Last 3 readings):   Weight   06/24/25 0442 113.4 kg (250 lb)   06/23/25 0500 112.4 kg (247 lb 11.2 oz)   06/22/25 0545 112.3 kg (247 lb 8 oz)     Vital Signs:   Blood pressure (!) 155/82, pulse 79, temperature 97.7 °F (36.5 °C), temperature source Oral, resp. rate 19, height 1.854 m (6' 1\"), weight 113.4 kg (250 lb), SpO2 97%.    General appearance:  Alert, responsive, oriented to person, place, and time.  Fatigued appearing, no distress.  Head:  Normocephalic. No masses, lesions or tenderness.  Eyes:  PERRLA.  EOMI.  Sclera clear.  Buccal mucosa moist.  ENT:  Ears normal. Mucosa normal.  Neck:    Supple. Trachea midline. No thyromegaly. No JVD. No bruits.  Heart:    Rhythm regular. Rate controlled.  No murmurs.  Lungs:    Symmetrical.  Diminished bilaterally.  No wheezes. No rhonchi. No rales.  Abdomen:   Soft. Non-tender. Non-distended. Bowel sounds positive. No organomegaly or masses.  No pain on palpation.  Extremities:    Peripheral pulses present.  No peripheral edema.  No ulcers. No cyanosis. No clubbing.  Neurologic:    Alert x 3.  No focal deficit.  Cranial nerves grossly intact. No focal weakness.  Psych:   Behavior is normal. Mood appears normal. Speech is not rapid and/or pressured.  Musculoskeletal:   Spine ROM normal. Muscular strength intact. Gait not assessed.  Integumentary:  No rashes  Skin normal color and texture.  Genitalia/Breast:  Deferred    Medication:  Scheduled Meds:   levoFLOXacin  750 mg Oral Every Other Day    predniSONE  20 mg Oral Daily    insulin glargine  12 Units SubCUTAneous Nightly    budesonide  0.5 mg Nebulization BID RT    ipratropium 0.5 mg-albuterol 2.5 mg  1 Dose

## 2025-06-25 ENCOUNTER — TELEPHONE (OUTPATIENT)
Dept: ONCOLOGY | Age: 75
End: 2025-06-25

## 2025-06-25 ENCOUNTER — TELEPHONE (OUTPATIENT)
Dept: OTHER | Age: 75
End: 2025-06-25

## 2025-06-25 ENCOUNTER — TELEPHONE (OUTPATIENT)
Dept: CARDIOLOGY CLINIC | Age: 75
End: 2025-06-25

## 2025-06-25 DIAGNOSIS — Z13.9 ENCOUNTER FOR SCREENING INVOLVING SOCIAL DETERMINANTS OF HEALTH (SDOH): ICD-10-CM

## 2025-06-25 DIAGNOSIS — Z59.86 PATIENT CANNOT AFFORD MEDICATIONS: Primary | ICD-10-CM

## 2025-06-25 SDOH — ECONOMIC STABILITY - INCOME SECURITY: FINANCIAL INSECURITY: Z59.86

## 2025-06-25 NOTE — TELEPHONE ENCOUNTER
I called the pharmacy and they said the Entresto was $600. They did give the 30 day free trial coupon. I will call pt and go over PAP.

## 2025-06-25 NOTE — TELEPHONE ENCOUNTER
CHF CHW Initial Call  6/25/2025  10:11 AM    CHW received referral from Sindi Gaona RN.  Patient need: prescription assistance   Notes: CHW called patient to assist with prescription coverage. Pt did not answer. I left a VM with a detailed message about the PAP and will anticipate a return call back.    Patient in agreement with plan and further assistance.  [x] Agreed    [] Declined      CHW informed patient of the services and resources that can be provided to them. CHW instructed patient to call CHF CHW at [phone] for any future assistance.     Electronically signed by Miesha Marrufo on 6/25/2025 at 10:11 AM

## 2025-06-25 NOTE — TELEPHONE ENCOUNTER
Chart reviewed- patient was to be discharged with 14 day ZIO monitor. Called and spoke with Eugenia- bedside nurse who discharged patient on 6/24/25. She verified that patient did not have ZIO placed.

## 2025-06-25 NOTE — TELEPHONE ENCOUNTER
Spoke with patient re: HFU appointment with Cardiology [facility] for eval and to place ZIO patch. Patient agreeable to scheduled appointment tomorrow.   Future Appointments   Date Time Provider Department Center   6/26/2025  9:45 AM Robyn Nugent PA [facility]   7/11/2025  7:30 AM [facility] ROOM 1 [facility]   7/17/2025  8:15 AM [facility] ROOM 2 [facility]   7/25/2025  9:45 AM Jenelle Ledezma, APRN - CNP [facility]

## 2025-06-25 NOTE — PROGRESS NOTES
prior to taking medications  Please notify clinic if SBP running low at 100 mmHg or below prior to taking medication  Recommend 2000 mg daily sodium restriction  Check daily weights every morning  Notify CHF clinic with any rapid weight gain of 3 pounds or more in 1 to 2 days or 5 pounds in 1 week  14-day ZIO monitor placed today to further evaluate frequent PVCs as noted during recent hospital admission with monitor recommended at discharge  Plan to reevaluate LV function in the next 3 to 6 months  Patient declines referral to sleep medicine for ADEEL evaluation.  Has been advised to notify clinic if he is willing to reconsider  Follow-up in CHF clinic on 7/11/2025 as scheduled  Follow-up with cardiology, Dr. Kessler in 2 months    -Weigh yourself daily    -Stay Hydrated, 64 oz fluid daily    -Diet should be sodium restricted to 2 grams    -Again watch your daily weight trends and if you gain water weight please follow below instructions.    -If at any time you feel that you are retaining fluid, your medications are not working, or you feel ill in anyway, then please call us for either same day appointment or the next day, and for instructions. Our goal is to keep you out of the emergency room and the hospital and we have ways to do it. You just need to call us in a timely manner.     -If you become sick for other reasons, and notice that you are not urinating as much, the urine is very dark, you have significant diarrhea or vomiting, then please DO NOT take your water pill and CALL US immediately.      Robyn Nugent PA-C  [facility]

## 2025-06-25 NOTE — TELEPHONE ENCOUNTER
----- Message from OLIVIA REVELES LPN sent at 6/25/2025 11:17 AM EDT -----  Regarding: RE: ZIO order  Please just send this stuff to the coordinators. [place] is out of the [place] office and the girls in the [place] office, I monitor their inbaskets. Thank you.  ----- Message -----  From: Sindi Gaona RN  Sent: 6/25/2025  11:02 AM EDT  To: Ashtyn López MA; Kailey Kohli LPN; #  Subject: ZIO order                                        Dr. Kessler put in his note he wanted the patient to go home with a 14 day Zio monitor. He did not place the order and the patient was discharged with out it. Could you reach out to see if he still wants it ordered? Could he place the order if he still wants it and then we can put it on him at his HFU in the clinic tomorrow?   Thanks  Sindi   [phone]

## 2025-06-26 ENCOUNTER — TELEPHONE (OUTPATIENT)
Dept: OTHER | Age: 75
End: 2025-06-26

## 2025-06-26 ENCOUNTER — OFFICE VISIT (OUTPATIENT)
Age: 75
End: 2025-06-26
Payer: MEDICARE

## 2025-06-26 ENCOUNTER — TELEPHONE (OUTPATIENT)
Age: 75
End: 2025-06-26

## 2025-06-26 VITALS
BODY MASS INDEX: 31.24 KG/M2 | SYSTOLIC BLOOD PRESSURE: 90 MMHG | OXYGEN SATURATION: 96 % | DIASTOLIC BLOOD PRESSURE: 56 MMHG | HEART RATE: 85 BPM | WEIGHT: 236.8 LBS | RESPIRATION RATE: 18 BRPM

## 2025-06-26 DIAGNOSIS — I42.8 NONISCHEMIC CARDIOMYOPATHY (HCC): ICD-10-CM

## 2025-06-26 DIAGNOSIS — I49.3 FREQUENT PVCS: ICD-10-CM

## 2025-06-26 DIAGNOSIS — I50.22 CHRONIC HFREF (HEART FAILURE WITH REDUCED EJECTION FRACTION) (HCC): Primary | ICD-10-CM

## 2025-06-26 LAB
ANION GAP SERPL CALCULATED.3IONS-SCNC: 17 MMOL/L (ref 7–16)
BUN BLDV-MCNC: 33 MG/DL (ref 8–23)
CALCIUM SERPL-MCNC: 8.9 MG/DL (ref 8.8–10.2)
CHLORIDE BLD-SCNC: 102 MMOL/L (ref 98–107)
CO2: 16 MMOL/L (ref 22–29)
CREAT SERPL-MCNC: 1.7 MG/DL (ref 0.7–1.2)
GFR, ESTIMATED: 42 ML/MIN/1.73M2
GLUCOSE BLD-MCNC: 168 MG/DL (ref 74–99)
NT PRO BNP: 862 PG/ML (ref 0–450)
POTASSIUM SERPL-SCNC: 4.4 MMOL/L (ref 3.5–5.1)
SODIUM BLD-SCNC: 135 MMOL/L (ref 136–145)

## 2025-06-26 PROCEDURE — 1159F MED LIST DOCD IN RCRD: CPT | Performed by: PHYSICIAN ASSISTANT

## 2025-06-26 PROCEDURE — 3074F SYST BP LT 130 MM HG: CPT | Performed by: PHYSICIAN ASSISTANT

## 2025-06-26 PROCEDURE — 3078F DIAST BP <80 MM HG: CPT | Performed by: PHYSICIAN ASSISTANT

## 2025-06-26 PROCEDURE — 99214 OFFICE O/P EST MOD 30 MIN: CPT | Performed by: PHYSICIAN ASSISTANT

## 2025-06-26 PROCEDURE — 1123F ACP DISCUSS/DSCN MKR DOCD: CPT | Performed by: PHYSICIAN ASSISTANT

## 2025-06-26 PROCEDURE — 36415 COLL VENOUS BLD VENIPUNCTURE: CPT | Performed by: PHYSICIAN ASSISTANT

## 2025-06-26 ASSESSMENT — ENCOUNTER SYMPTOMS
SHORTNESS OF BREATH: 0
VOMITING: 0
ABDOMINAL DISTENTION: 0
CHEST TIGHTNESS: 0
NAUSEA: 0
COLOR CHANGE: 0

## 2025-06-26 NOTE — TELEPHONE ENCOUNTER
Patient's wife called into the CHF Clinic to verify which metoprolol he is taking. He is currently taking Metoprolol Tartrate 25 mg daily. Robyn Nugent PA-C will be updated.

## 2025-06-26 NOTE — PROGRESS NOTES
CLINICAL PHARMACY NOTE: MEDS TO BEDS    Total # of Prescriptions Filled: 3   The following medications were delivered to the patient:  Entresto 24-25 mg  Levofloxacin 750 mg  Prednisone 20 mg    Additional Documentation:  Vitamin b 12 1000 mcg sent to [facility]

## 2025-06-26 NOTE — PATIENT INSTRUCTIONS
Check labs in clinic today.  Will call with any new medication adjustments following review of blood work.  Continue cardiac meds the same for now  HOLD dose of Entresto if systolic blood pressure (SBP/top number) <100mmHg prior to taking dose  Please call clinic back upon returning home to clarify metoprolol  We have listed that you are taking metoprolol tartrate 25 mg daily but typically this short acting pill should be dosed twice a day.  Check to see if you are taking metoprolol succinate which is the long-acting form  Please obtain BP cuff from pharmacy and check blood pressure twice daily in a.m. and p.m. prior to taking medications  Please notify clinic if SBP running low at 100 mmHg or below prior to taking medication  Stay hydrated with 64 ounces of fluid daily  Recommend 2000 mg daily sodium restriction  Check daily weights every morning  Notify CHF clinic with any rapid weight gain of 3 pounds or more in 1 to 2 days or 5 pounds in 1 week  14-day ZIO monitor placed today to further evaluate frequent PVCs as noted during recent hospital admission with monitor recommended at discharge  Follow-up in CHF clinic on 7/11/2025 as scheduled  Follow-up with cardiology, Dr. Kessler in 2 months    -Weigh yourself daily    -Stay Hydrated, 64 oz fluid daily    -Diet should be sodium restricted to 2 grams    -Again watch your daily weight trends and if you gain water weight please follow below instructions.    -If at any time you feel that you are retaining fluid, your medications are not working, or you feel ill in anyway, then please call us for either same day appointment or the next day, and for instructions. Our goal is to keep you out of the emergency room and the hospital and we have ways to do it. You just need to call us in a timely manner.     -If you become sick for other reasons, and notice that you are not urinating as much, the urine is very dark, you have significant diarrhea or vomiting, then please DO NOT

## 2025-06-27 ENCOUNTER — RESULTS FOLLOW-UP (OUTPATIENT)
Age: 75
End: 2025-06-27

## 2025-06-27 ENCOUNTER — TELEPHONE (OUTPATIENT)
Dept: OTHER | Age: 75
End: 2025-06-27

## 2025-06-27 RX ORDER — METOPROLOL SUCCINATE 25 MG/1
25 TABLET, EXTENDED RELEASE ORAL NIGHTLY
Qty: 30 TABLET | Refills: 3 | Status: SHIPPED | OUTPATIENT
Start: 2025-06-27

## 2025-06-27 NOTE — TELEPHONE ENCOUNTER
----- Message from ANGEL Márqeuz sent at 6/27/2025  7:10 AM EDT -----  Labs reviewed    HFrEF, LVEF 35 to 40% per TTE 6/23/2025    Current GDMT:  Imdur 30 mg p.o. daily  Lopressor 25 mg p.o. daily  Entresto 24/26 mg p.o. twice daily    NT proBNP 862 compared to 6926 on 6/20/2025  BMP shows worsening renal function with creatinine elevated 1.7, BUN of 33 and GFR low at 42 compared to 1.4, 26 and 52 respectively on 6/23/2025 and 1.3, 26 and 56 respectively on 6/22/2025.    1.  Discontinue Imdur 30 mg daily given borderline low blood pressure in office yesterday  2.  Discontinue Lopressor (metoprolol tartrate) and change to Toprol XL (metoprolol succinate) 25 mg nightly  3.  Hold Entresto x 3 days and resume on Monday 6/30/2025        --Can you please ask if patient was able to obtain BP cuff.  If so, I would like blood pressure checked prior to taking dose of Entresto and if SBP less than 100 prior to taking, I would hold that dose of Entresto  4.  Ensure patient is staying hydrated with 64 ounces of fluid daily  5.  Follow-up in CHF clinic on 7/11/2025 as scheduled

## 2025-06-27 NOTE — TELEPHONE ENCOUNTER
Message from ANGEL Márquez sent at 6/27/2025  7:10 AM EDT -----  Labs reviewed     HFrEF, LVEF 35 to 40% per TTE 6/23/2025     Current GDMT:  Imdur 30 mg p.o. daily  Lopressor 25 mg p.o. daily  Entresto 24/26 mg p.o. twice daily     NT proBNP 862 compared to 6926 on 6/20/2025  BMP shows worsening renal function with creatinine elevated 1.7, BUN of 33 and GFR low at 42 compared to 1.4, 26 and 52 respectively on 6/23/2025 and 1.3, 26 and 56 respectively on 6/22/2025.     1.  Discontinue Imdur 30 mg daily given borderline low blood pressure in office yesterday  2.  Discontinue Lopressor (metoprolol tartrate) and change to Toprol XL (metoprolol succinate) 25 mg nightly  3.  Hold Entresto x 3 days and resume on Monday 6/30/2025        --Can you please ask if patient was able to obtain BP cuff.  If so, I would like blood pressure checked prior to taking dose of Entresto and if SBP less than 100 prior to taking, I would hold that dose of Entresto  4.  Ensure patient is staying hydrated with 64 ounces of fluid daily  5.  Follow-up in CHF clinic on 7/11/2025 as scheduled      Patients wife was advised per YANA ORTIZ.  Patients wife verbalized understanding and was told to call for any questions.

## 2025-06-27 NOTE — RESULT ENCOUNTER NOTE
Labs reviewed    HFrEF, LVEF 35 to 40% per TTE 6/23/2025    Current GDMT:  Imdur 30 mg p.o. daily  Lopressor 25 mg p.o. daily  Entresto 24/26 mg p.o. twice daily    NT proBNP 862 compared to 6926 on 6/20/2025  BMP shows worsening renal function with creatinine elevated 1.7, BUN of 33 and GFR low at 42 compared to 1.4, 26 and 52 respectively on 6/23/2025 and 1.3, 26 and 56 respectively on 6/22/2025.    1.  Discontinue Imdur 30 mg daily given borderline low blood pressure in office yesterday  2.  Discontinue Lopressor (metoprolol tartrate) and change to Toprol XL (metoprolol succinate) 25 mg nightly  3.  Hold Entresto x 3 days and resume on Monday 6/30/2025        --Can you please ask if patient was able to obtain BP cuff.  If so, I would like blood pressure checked prior to taking dose of Entresto and if SBP less than 100 prior to taking, I would hold that dose of Entresto  4.  Ensure patient is staying hydrated with 64 ounces of fluid daily  5.  Follow-up in CHF clinic on 7/11/2025 as scheduled

## 2025-06-30 NOTE — PROGRESS NOTES
Physician Progress Note      PATIENT:               GEOVANNA GARCIA  CSN #:                  [id number]  :                       1950  ADMIT DATE:       2025 5:14 PM  DISCH DATE:        2025 5:36 PM  RESPONDING  PROVIDER #:        Deng Infante DO          QUERY TEXT:    Congestive Heart Failure is documented in the medical record on the H&P at the   time of admission. Please document the type and acuity:    The clinical indicators include:  - \"CHF on isosorbide mononitrate, ASA, metoprolol\" on the H&P.  \"CHF\" on progress notes dated 25 by Dr. Kessler  - \"Acute on chronic HFpEF\", per progress nots dated 25 by Dr. Shell  - \"Systolic congestive heart failure with depressed ejection fraction   titrating to achieve goal-directed therapy\" per the Dc summary.  - \"Left Ventricle: The EF by visual approximation is 35 - 40%.Findings   consistent with moderate asymmetric hypertrophy.Grade II diastolic dysfunction   with increased LAP. - Right Ventricle: Right ventricle size is normal.Normal   systolic function.\" per the ECHO results note  Options provided:  -- Acute on Chronic Systolic CHF/HFrEF  -- Acute on Chronic Diastolic CHF/HFpEF  -- Acute on Chronic Systolic and Diastolic CHF  -- Acute Systolic CHF/HFrEF  -- Acute Diastolic CHF/HFpEF  -- Acute Systolic and Diastolic CHF  -- Chronic Systolic CHF/HFrEF  -- Chronic Diastolic CHF/HFpEF  -- Chronic Systolic and Diastolic CHF  -- Other - I will add my own diagnosis  -- Disagree - Not applicable / Not valid  -- Disagree - Clinically unable to determine / Unknown  -- Refer to Clinical Documentation Reviewer    PROVIDER RESPONSE TEXT:    This patient is in acute on chronic systolic CHF/HFrEF.    Query created by: Zainab Arita on 2025 11:13 AM      Electronically signed by:  Deng Infante DO 2025 9:21 AM

## 2025-07-11 ENCOUNTER — HOSPITAL ENCOUNTER (OUTPATIENT)
Dept: OTHER | Age: 75
Setting detail: THERAPIES SERIES
Discharge: HOME | End: 2025-07-11
Payer: MEDICARE

## 2025-07-11 ENCOUNTER — TELEPHONE (OUTPATIENT)
Dept: OTHER | Age: 75
End: 2025-07-11

## 2025-07-11 ENCOUNTER — TELEPHONE (OUTPATIENT)
Dept: CARDIOLOGY CLINIC | Age: 75
End: 2025-07-11

## 2025-07-11 VITALS
HEART RATE: 69 BPM | SYSTOLIC BLOOD PRESSURE: 134 MMHG | RESPIRATION RATE: 17 BRPM | DIASTOLIC BLOOD PRESSURE: 72 MMHG | WEIGHT: 241 LBS | BODY MASS INDEX: 31.8 KG/M2 | OXYGEN SATURATION: 98 %

## 2025-07-11 LAB
ANION GAP SERPL CALCULATED.3IONS-SCNC: 12 MMOL/L (ref 7–16)
BNP SERPL-MCNC: 657 PG/ML (ref 0–450)
BUN SERPL-MCNC: 16 MG/DL (ref 8–23)
CALCIUM SERPL-MCNC: 8.6 MG/DL (ref 8.8–10.2)
CHLORIDE SERPL-SCNC: 109 MMOL/L (ref 98–107)
CO2 SERPL-SCNC: 18 MMOL/L (ref 22–29)
CREAT SERPL-MCNC: 1.6 MG/DL (ref 0.7–1.2)
GFR, ESTIMATED: 46 ML/MIN/1.73M2
GLUCOSE SERPL-MCNC: 81 MG/DL (ref 74–99)
POTASSIUM SERPL-SCNC: 5.6 MMOL/L (ref 3.5–5.1)
SODIUM SERPL-SCNC: 139 MMOL/L (ref 136–145)

## 2025-07-11 PROCEDURE — 36415 COLL VENOUS BLD VENIPUNCTURE: CPT

## 2025-07-11 PROCEDURE — 83880 ASSAY OF NATRIURETIC PEPTIDE: CPT

## 2025-07-11 PROCEDURE — 80048 BASIC METABOLIC PNL TOTAL CA: CPT

## 2025-07-11 PROCEDURE — 99214 OFFICE O/P EST MOD 30 MIN: CPT

## 2025-07-11 RX ORDER — ISOSORBIDE MONONITRATE 30 MG/1
30 TABLET, EXTENDED RELEASE ORAL DAILY
COMMUNITY

## 2025-07-11 ASSESSMENT — PATIENT HEALTH QUESTIONNAIRE - PHQ9
1. LITTLE INTEREST OR PLEASURE IN DOING THINGS: NOT AT ALL
SUM OF ALL RESPONSES TO PHQ QUESTIONS 1-9: 0
2. FEELING DOWN, DEPRESSED OR HOPELESS: NOT AT ALL
SUM OF ALL RESPONSES TO PHQ QUESTIONS 1-9: 0

## 2025-07-11 NOTE — TELEPHONE ENCOUNTER
Hyperkalemia  Received: Today  Oly Lay, MAGDALENA Wilsons, [place], [place]; Haris Kessler MD  Patient with Hyperkalemia (5.6) after starting Entresto 24-26 BID on 06/30. He is not on PO Potassium or Aldactone. Specimen not hemolyzed. Patient is euvolemic on assessment. Please advise.      
81.6

## 2025-07-11 NOTE — TELEPHONE ENCOUNTER
Pt had monitor placed and just returned it yesterday he states. We wont get the report yet.  Please advise.

## 2025-07-11 NOTE — TELEPHONE ENCOUNTER
----- Message from MAGDALENA CHURCH RN sent at 7/11/2025  2:42 PM EDT -----  Regarding: Hyperkalemia  Patient with Hyperkalemia (5.6) after starting Entresto 24-26 BID on 06/30. He is not on PO Potassium or Aldactone. Specimen not hemolyzed. Patient is euvolemic on assessment. Please advise.

## 2025-07-11 NOTE — PROGRESS NOTES
clinic on:   Future Appointments   Date Time Provider Department [place]   7/17/2025  8:15 AM [facility] CHF ROOM 2 [facility]   8/1/2025 11:00 AM Haris Kessler MD [place] [facility]

## 2025-07-14 RX ORDER — SACUBITRIL AND VALSARTAN 24; 26 MG/1; MG/1
1 TABLET, FILM COATED ORAL 2 TIMES DAILY
Qty: 60 TABLET | Refills: 3 | Status: SHIPPED | OUTPATIENT
Start: 2025-07-14 | End: 2025-07-17 | Stop reason: SDUPTHER

## 2025-07-17 ENCOUNTER — HOSPITAL ENCOUNTER (OUTPATIENT)
Dept: OTHER | Age: 75
Setting detail: THERAPIES SERIES
Discharge: HOME | End: 2025-07-17
Payer: MEDICARE

## 2025-07-17 ENCOUNTER — RESULTS FOLLOW-UP (OUTPATIENT)
Dept: CARDIOLOGY CLINIC | Age: 75
End: 2025-07-17

## 2025-07-17 VITALS
WEIGHT: 243.8 LBS | SYSTOLIC BLOOD PRESSURE: 133 MMHG | BODY MASS INDEX: 32.17 KG/M2 | DIASTOLIC BLOOD PRESSURE: 72 MMHG | HEART RATE: 78 BPM | RESPIRATION RATE: 18 BRPM | OXYGEN SATURATION: 98 %

## 2025-07-17 DIAGNOSIS — I50.20 HFREF (HEART FAILURE WITH REDUCED EJECTION FRACTION) (HCC): Primary | ICD-10-CM

## 2025-07-17 LAB
ANION GAP SERPL CALCULATED.3IONS-SCNC: 12 MMOL/L (ref 7–16)
BNP SERPL-MCNC: 1018 PG/ML (ref 0–450)
BUN SERPL-MCNC: 15 MG/DL (ref 8–23)
CALCIUM SERPL-MCNC: 9 MG/DL (ref 8.8–10.2)
CHLORIDE SERPL-SCNC: 107 MMOL/L (ref 98–107)
CO2 SERPL-SCNC: 18 MMOL/L (ref 22–29)
CREAT SERPL-MCNC: 1.4 MG/DL (ref 0.7–1.2)
GFR, ESTIMATED: 51 ML/MIN/1.73M2
GLUCOSE SERPL-MCNC: 111 MG/DL (ref 74–99)
POTASSIUM SERPL-SCNC: 4.9 MMOL/L (ref 3.5–5.1)
SODIUM SERPL-SCNC: 137 MMOL/L (ref 136–145)

## 2025-07-17 PROCEDURE — 80048 BASIC METABOLIC PNL TOTAL CA: CPT

## 2025-07-17 PROCEDURE — 36415 COLL VENOUS BLD VENIPUNCTURE: CPT

## 2025-07-17 PROCEDURE — 83880 ASSAY OF NATRIURETIC PEPTIDE: CPT

## 2025-07-17 PROCEDURE — 99214 OFFICE O/P EST MOD 30 MIN: CPT

## 2025-07-17 RX ORDER — SACUBITRIL AND VALSARTAN 24; 26 MG/1; MG/1
0.5 TABLET, FILM COATED ORAL 2 TIMES DAILY
Qty: 30 TABLET | Refills: 3 | Status: SHIPPED | OUTPATIENT
Start: 2025-07-17

## 2025-07-17 NOTE — RESULT ENCOUNTER NOTE
Labs and CHF clinic note reviewed  Patient potassium improved (he had been using salt substitute)  Restart Entresto 1/2 tablet 24/26 mg BID   Follow up BMP in 1 week

## 2025-07-17 NOTE — TELEPHONE ENCOUNTER
----- Message from YRIS Rankin CNP sent at 7/17/2025 12:34 PM EDT -----  Labs and CHF clinic note reviewed  Patient potassium improved (he had been using salt substitute)  Restart Entresto 1/2 tablet 24/26 mg BID   Follow up BMP in 1 week

## 2025-07-17 NOTE — PROGRESS NOTES
Congestive Heart Failure Clinic   [facility]      Referring Provider: [place]   Primary Care Physician: Potocki, Joseph A, DO   Cardiologist: [place]   Nephrologist: None       HISTORY OF PRESENT ILLNESS:     Eric Zarco is a 74 y.o. (1950) male with a history of HFrEF (EF < 40%)  Pre Cupid:     Lab Results   Component Value Date    LVEF 35 06/21/2025     Post Cupid:    Lab Results   Component Value Date    EFBP 39 (A) 06/23/2025         He presents to the CHF clinic for ongoing evaluation and monitoring of heart failure.    In the CHF clinic today he denies any adverse symptoms except:  [] Dizziness or lightheadedness   [] Syncope or near syncope  [] Recent Fall  [] Shortness of breath at rest   [] Dyspnea with exertion  [] Decline in functional capacity (unable to perform activities they had previously been able to do)  [] Fatigue   [] Orthopnea  [] PND  [] Nocturnal cough  [] Hemoptysis  [] Chest pain, pressure, or discomfort  [] Palpitations  [] Abdominal distention  [] Abdominal bloating  [] Early satiety  [] Blood in stool   [] Diarrhea  [] Constipation  [] Nausea/Vomiting  [] Decreased urinary response to oral diuretic   [] Scrotal swelling   [x] Lower extremity edema 1+ pitting to LLE   [] Used PRN doses of oral diuretic   [x] Weight gain    Wt Readings from Last 3 Encounters:   07/17/25 110.6 kg (243 lb 12.8 oz)   07/11/25 109.3 kg (241 lb)   06/26/25 107.4 kg (236 lb 12.8 oz)           SOCIAL HISTORY:  [x] Denies tobacco, alcohol or illicit drug abuse  [] Tobacco use:  [] ETOH use:  [] Illicit drug use:        MEDICATIONS:    No Known Allergies  Prior to Visit Medications    Medication Sig Taking? Authorizing Provider   sacubitril-valsartan (ENTRESTO) 24-26 MG per tablet Take 1 tablet by mouth 2 times daily Yes Jenelle Ledezma, APRN - CNP   isosorbide mononitrate (IMDUR) 30 MG extended release tablet Take 1 tablet by mouth daily Patient never

## 2025-08-14 ENCOUNTER — HOSPITAL ENCOUNTER (OUTPATIENT)
Dept: OTHER | Age: 75
Setting detail: THERAPIES SERIES
Discharge: HOME | End: 2025-08-14
Payer: MEDICARE

## 2025-08-14 VITALS
BODY MASS INDEX: 32.59 KG/M2 | WEIGHT: 247 LBS | OXYGEN SATURATION: 97 % | SYSTOLIC BLOOD PRESSURE: 133 MMHG | DIASTOLIC BLOOD PRESSURE: 72 MMHG | RESPIRATION RATE: 18 BRPM | HEART RATE: 73 BPM

## 2025-08-14 DIAGNOSIS — I49.3 FREQUENT PVCS: ICD-10-CM

## 2025-08-14 LAB
ANION GAP SERPL CALCULATED.3IONS-SCNC: 12 MMOL/L (ref 7–16)
BNP SERPL-MCNC: 1013 PG/ML (ref 0–450)
BUN SERPL-MCNC: 13 MG/DL (ref 8–23)
CALCIUM SERPL-MCNC: 8.9 MG/DL (ref 8.8–10.2)
CHLORIDE SERPL-SCNC: 106 MMOL/L (ref 98–107)
CO2 SERPL-SCNC: 18 MMOL/L (ref 22–29)
CREAT SERPL-MCNC: 1.4 MG/DL (ref 0.7–1.2)
GFR, ESTIMATED: 54 ML/MIN/1.73M2
GLUCOSE SERPL-MCNC: 91 MG/DL (ref 74–99)
POTASSIUM SERPL-SCNC: 5.2 MMOL/L (ref 3.5–5.1)
SODIUM SERPL-SCNC: 135 MMOL/L (ref 136–145)

## 2025-08-14 PROCEDURE — 80048 BASIC METABOLIC PNL TOTAL CA: CPT

## 2025-08-14 PROCEDURE — 99214 OFFICE O/P EST MOD 30 MIN: CPT

## 2025-08-14 PROCEDURE — 83880 ASSAY OF NATRIURETIC PEPTIDE: CPT

## 2025-08-14 PROCEDURE — 96374 THER/PROPH/DIAG INJ IV PUSH: CPT

## 2025-08-14 PROCEDURE — 2500000003 HC RX 250 WO HCPCS: Performed by: NURSE PRACTITIONER

## 2025-08-14 PROCEDURE — 36415 COLL VENOUS BLD VENIPUNCTURE: CPT

## 2025-08-14 PROCEDURE — 6360000002 HC RX W HCPCS: Performed by: NURSE PRACTITIONER

## 2025-08-14 RX ORDER — FUROSEMIDE 10 MG/ML
40 INJECTION INTRAMUSCULAR; INTRAVENOUS ONCE
Status: DISCONTINUED | OUTPATIENT
Start: 2025-08-14 | End: 2025-08-14 | Stop reason: ALTCHOICE

## 2025-08-14 RX ORDER — SODIUM CHLORIDE 0.9 % (FLUSH) 0.9 %
5-40 SYRINGE (ML) INJECTION 2 TIMES DAILY
Status: DISCONTINUED | OUTPATIENT
Start: 2025-08-14 | End: 2025-08-14 | Stop reason: ALTCHOICE

## 2025-08-14 RX ADMIN — FUROSEMIDE 40 MG: 10 INJECTION, SOLUTION INTRAMUSCULAR; INTRAVENOUS at 09:27

## 2025-08-14 RX ADMIN — SODIUM CHLORIDE, PRESERVATIVE FREE 10 ML: 5 INJECTION INTRAVENOUS at 09:27

## 2025-08-15 ENCOUNTER — TELEPHONE (OUTPATIENT)
Age: 75
End: 2025-08-15

## 2025-08-29 ENCOUNTER — HOSPITAL ENCOUNTER (EMERGENCY)
Age: 75
Discharge: HOME | End: 2025-08-30
Attending: EMERGENCY MEDICINE
Payer: MEDICARE

## 2025-08-29 ENCOUNTER — APPOINTMENT (OUTPATIENT)
Dept: GENERAL RADIOLOGY | Age: 75
End: 2025-08-29
Payer: MEDICARE

## 2025-08-29 ENCOUNTER — HOSPITAL ENCOUNTER (OUTPATIENT)
Dept: OTHER | Age: 75
Setting detail: THERAPIES SERIES
Discharge: HOME | End: 2025-08-29
Payer: MEDICARE

## 2025-08-29 VITALS
HEART RATE: 65 BPM | WEIGHT: 244 LBS | RESPIRATION RATE: 18 BRPM | BODY MASS INDEX: 32.19 KG/M2 | SYSTOLIC BLOOD PRESSURE: 185 MMHG | DIASTOLIC BLOOD PRESSURE: 79 MMHG | OXYGEN SATURATION: 98 %

## 2025-08-29 DIAGNOSIS — E87.5 HYPERKALEMIA: Primary | ICD-10-CM

## 2025-08-29 LAB
ANION GAP SERPL CALCULATED.3IONS-SCNC: 15 MMOL/L (ref 7–16)
BNP SERPL-MCNC: 1176 PG/ML (ref 0–450)
BUN SERPL-MCNC: 13 MG/DL (ref 8–23)
CALCIUM SERPL-MCNC: 9.4 MG/DL (ref 8.8–10.2)
CHLORIDE SERPL-SCNC: 107 MMOL/L (ref 98–107)
CO2 SERPL-SCNC: 14 MMOL/L (ref 22–29)
CREAT SERPL-MCNC: 1.4 MG/DL (ref 0.7–1.2)
GFR, ESTIMATED: 53 ML/MIN/1.73M2
GLUCOSE BLD-MCNC: 102 MG/DL (ref 74–99)
GLUCOSE SERPL-MCNC: 145 MG/DL (ref 74–99)
POTASSIUM SERPL-SCNC: 6.1 MMOL/L (ref 3.5–5.1)
POTASSIUM SERPL-SCNC: 6.2 MMOL/L (ref 3.5–5.1)
SODIUM SERPL-SCNC: 136 MMOL/L (ref 136–145)
TROPONIN I SERPL HS-MCNC: 11 NG/L (ref 0–22)

## 2025-08-29 PROCEDURE — 83880 ASSAY OF NATRIURETIC PEPTIDE: CPT

## 2025-08-29 PROCEDURE — 84132 ASSAY OF SERUM POTASSIUM: CPT

## 2025-08-29 PROCEDURE — 82962 GLUCOSE BLOOD TEST: CPT

## 2025-08-29 PROCEDURE — 2500000003 HC RX 250 WO HCPCS: Performed by: EMERGENCY MEDICINE

## 2025-08-29 PROCEDURE — 84484 ASSAY OF TROPONIN QUANT: CPT

## 2025-08-29 PROCEDURE — 2580000003 HC RX 258: Performed by: EMERGENCY MEDICINE

## 2025-08-29 PROCEDURE — 6360000002 HC RX W HCPCS: Performed by: EMERGENCY MEDICINE

## 2025-08-29 PROCEDURE — 71046 X-RAY EXAM CHEST 2 VIEWS: CPT

## 2025-08-29 PROCEDURE — 6370000000 HC RX 637 (ALT 250 FOR IP): Performed by: EMERGENCY MEDICINE

## 2025-08-29 PROCEDURE — 99214 OFFICE O/P EST MOD 30 MIN: CPT

## 2025-08-29 PROCEDURE — 36415 COLL VENOUS BLD VENIPUNCTURE: CPT

## 2025-08-29 PROCEDURE — 80048 BASIC METABOLIC PNL TOTAL CA: CPT

## 2025-08-29 RX ORDER — CALCIUM GLUCONATE 20 MG/ML
2000 INJECTION, SOLUTION INTRAVENOUS ONCE
Status: COMPLETED | OUTPATIENT
Start: 2025-08-29 | End: 2025-08-29

## 2025-08-29 RX ORDER — 0.9 % SODIUM CHLORIDE 0.9 %
1000 INTRAVENOUS SOLUTION INTRAVENOUS ONCE
Status: COMPLETED | OUTPATIENT
Start: 2025-08-29 | End: 2025-08-29

## 2025-08-29 RX ORDER — INDOMETHACIN 25 MG/1
50 CAPSULE ORAL ONCE
Status: COMPLETED | OUTPATIENT
Start: 2025-08-29 | End: 2025-08-29

## 2025-08-29 RX ORDER — DEXTROSE MONOHYDRATE 25 G/50ML
50 INJECTION, SOLUTION INTRAVENOUS ONCE
Status: COMPLETED | OUTPATIENT
Start: 2025-08-29 | End: 2025-08-29

## 2025-08-29 RX ADMIN — SODIUM CHLORIDE 1000 ML: 0.9 INJECTION, SOLUTION INTRAVENOUS at 19:55

## 2025-08-29 RX ADMIN — DEXTROSE MONOHYDRATE 50 ML: 25 INJECTION, SOLUTION INTRAVENOUS at 20:15

## 2025-08-29 RX ADMIN — INSULIN HUMAN 10 UNITS: 100 INJECTION, SOLUTION PARENTERAL at 20:21

## 2025-08-29 RX ADMIN — CALCIUM GLUCONATE 2000 MG: 20 INJECTION, SOLUTION INTRAVENOUS at 19:53

## 2025-08-29 RX ADMIN — SODIUM BICARBONATE 50 MEQ: 84 INJECTION INTRAVENOUS at 20:15

## 2025-08-29 RX ADMIN — SODIUM ZIRCONIUM CYCLOSILICATE 10 G: 10 POWDER, FOR SUSPENSION ORAL at 23:08

## 2025-08-29 ASSESSMENT — ENCOUNTER SYMPTOMS
CHEST TIGHTNESS: 0
SHORTNESS OF BREATH: 0

## 2025-08-29 ASSESSMENT — LIFESTYLE VARIABLES
HOW MANY STANDARD DRINKS CONTAINING ALCOHOL DO YOU HAVE ON A TYPICAL DAY: PATIENT DOES NOT DRINK
HOW OFTEN DO YOU HAVE A DRINK CONTAINING ALCOHOL: NEVER

## 2025-08-30 VITALS
DIASTOLIC BLOOD PRESSURE: 87 MMHG | TEMPERATURE: 98 F | SYSTOLIC BLOOD PRESSURE: 150 MMHG | HEART RATE: 86 BPM | RESPIRATION RATE: 20 BRPM | OXYGEN SATURATION: 97 %

## 2025-08-30 LAB
EKG ATRIAL RATE: 93 BPM
EKG P AXIS: 24 DEGREES
EKG P-R INTERVAL: 160 MS
EKG Q-T INTERVAL: 348 MS
EKG QRS DURATION: 96 MS
EKG QTC CALCULATION (BAZETT): 432 MS
EKG R AXIS: -9 DEGREES
EKG T AXIS: 70 DEGREES
EKG VENTRICULAR RATE: 93 BPM
POTASSIUM SERPL-SCNC: 4.6 MMOL/L (ref 3.5–5.1)
POTASSIUM SERPL-SCNC: 5.2 MMOL/L (ref 3.5–5.1)

## 2025-08-30 PROCEDURE — 84132 ASSAY OF SERUM POTASSIUM: CPT

## (undated) DEVICE — CLIP, LIGATING, W/ADHESIVE PAD, MEDIUM, TITANIUM

## (undated) DEVICE — SPONGE, DISSECTING, PEANUT, PEEL, STERILE 5

## (undated) DEVICE — SOLUTION IV IRRIG POUR BRL 0.9% SODIUM CHL 2F7124

## (undated) DEVICE — ENCORE® LATEX MICRO SIZE 8.5, STERILE LATEX POWDER-FREE SURGICAL GLOVE: Brand: ENCORE

## (undated) DEVICE — CLIP, LIGATING, W/ADHESIVE, WIDE SLOT, SMALL, TITANIUM

## (undated) DEVICE — Device

## (undated) DEVICE — TOWEL PACK, STERILE, 4/PACK, BLUE

## (undated) DEVICE — EVACUATOR, WOUND, SUCTION, CLOSED, JACKSON-PRATT, 100 CC, SILICONE

## (undated) DEVICE — 3 ML SYRINGE LUER-LOCK TIP: Brand: MONOJECT

## (undated) DEVICE — PROBE, STIMULATOR, MONOPOLAR, FLUSH TIP, PRASS, W/HANDLE, STANDARD

## (undated) DEVICE — SOLUTION IV IRRIG WATER 1000ML POUR BRL 2F7114

## (undated) DEVICE — SOLUTION SCRB 32OZ 7.5% POVIDONE IOD BTL GENTLE EFFECTIVE

## (undated) DEVICE — MANIFOLD, 4 PORT NEPTUNE STANDARD

## (undated) DEVICE — COUNTER, NEEDLE, FOAM BLOCK, W/MAGNET, W/BLADE GUARD, 10 COUNT, RED, LF

## (undated) DEVICE — HOLSTER, JET SAFETY

## (undated) DEVICE — BOWL, BASIN, 32 OZ, STERILE

## (undated) DEVICE — REST, HEAD, BAGEL, 9 IN

## (undated) DEVICE — SUTURE, PLAIN, 5-0, 18 IN, PC1, YELLOW

## (undated) DEVICE — STIMULATOR, NERVE LOCATOR, HEAD&NECK

## (undated) DEVICE — SURGICAL PROCEDURE PACK CATRCT LT EYE BASIC CUST ST JOS LF

## (undated) DEVICE — COVER, CART, 45 X 27 X 48 IN, CLEAR

## (undated) DEVICE — STERILE POLYISOPRENE POWDER-FREE SURGICAL GLOVES: Brand: PROTEXIS

## (undated) DEVICE — NEEDLE, HYPODERMIC, 25 G X 1.5 IN, A BEVEL, STERILE

## (undated) DEVICE — SPONGE, LAP, XRAY DECT, SC+RFID, 12X12, STERILE